# Patient Record
Sex: FEMALE | Race: WHITE | NOT HISPANIC OR LATINO | Employment: UNEMPLOYED | URBAN - METROPOLITAN AREA
[De-identification: names, ages, dates, MRNs, and addresses within clinical notes are randomized per-mention and may not be internally consistent; named-entity substitution may affect disease eponyms.]

---

## 2023-03-10 DIAGNOSIS — I10 PRIMARY HYPERTENSION: ICD-10-CM

## 2023-03-10 DIAGNOSIS — R73.03 PREDIABETES: Primary | ICD-10-CM

## 2023-03-10 DIAGNOSIS — F41.9 ANXIETY: ICD-10-CM

## 2023-03-10 DIAGNOSIS — M19.90 OSTEOARTHRITIS, UNSPECIFIED OSTEOARTHRITIS TYPE, UNSPECIFIED SITE: ICD-10-CM

## 2023-03-10 DIAGNOSIS — E78.5 HYPERLIPIDEMIA, UNSPECIFIED HYPERLIPIDEMIA TYPE: ICD-10-CM

## 2023-03-10 DIAGNOSIS — E78.2 MIXED HYPERLIPIDEMIA: ICD-10-CM

## 2023-03-10 DIAGNOSIS — R73.03 PREDIABETES: ICD-10-CM

## 2023-03-10 DIAGNOSIS — M17.0 PRIMARY OSTEOARTHRITIS OF BOTH KNEES: ICD-10-CM

## 2023-03-10 RX ORDER — ESCITALOPRAM OXALATE 10 MG/1
10 TABLET ORAL DAILY
Qty: 90 TABLET | Refills: 0 | Status: CANCELLED | OUTPATIENT
Start: 2023-03-10

## 2023-03-10 RX ORDER — LISINOPRIL 5 MG/1
5 TABLET ORAL DAILY
COMMUNITY
End: 2023-03-12 | Stop reason: SDUPTHER

## 2023-03-10 RX ORDER — ONDANSETRON 4 MG/1
8 TABLET, FILM COATED ORAL EVERY 8 HOURS PRN
COMMUNITY
End: 2023-07-19 | Stop reason: ALTCHOICE

## 2023-03-10 RX ORDER — MELOXICAM 15 MG/1
15 TABLET ORAL DAILY
COMMUNITY
Start: 2022-04-11 | End: 2023-03-12 | Stop reason: SDUPTHER

## 2023-03-10 RX ORDER — ESCITALOPRAM OXALATE 10 MG/1
10 TABLET ORAL DAILY
COMMUNITY
Start: 2020-03-04 | End: 2023-03-12 | Stop reason: SDUPTHER

## 2023-03-10 RX ORDER — MELOXICAM 15 MG/1
15 TABLET ORAL DAILY
Qty: 90 TABLET | Refills: 0 | Status: CANCELLED | OUTPATIENT
Start: 2023-03-10

## 2023-03-10 RX ORDER — ATORVASTATIN CALCIUM 20 MG/1
20 TABLET, FILM COATED ORAL DAILY
Qty: 90 TABLET | Refills: 0 | Status: CANCELLED | OUTPATIENT
Start: 2023-03-10

## 2023-03-10 RX ORDER — METFORMIN HYDROCHLORIDE 500 MG/1
1000 TABLET, EXTENDED RELEASE ORAL
COMMUNITY
End: 2023-03-12 | Stop reason: SDUPTHER

## 2023-03-10 RX ORDER — ATORVASTATIN CALCIUM 20 MG/1
20 TABLET, FILM COATED ORAL DAILY
COMMUNITY
End: 2023-03-12 | Stop reason: SDUPTHER

## 2023-03-10 RX ORDER — LISINOPRIL 5 MG/1
5 TABLET ORAL DAILY
Qty: 90 TABLET | Refills: 0 | Status: CANCELLED | OUTPATIENT
Start: 2023-03-10

## 2023-03-10 RX ORDER — METFORMIN HYDROCHLORIDE 500 MG/1
1000 TABLET, EXTENDED RELEASE ORAL
Qty: 90 TABLET | Refills: 0 | Status: CANCELLED | OUTPATIENT
Start: 2023-03-10

## 2023-03-10 RX ORDER — ALBUTEROL SULFATE 90 UG/1
1-2 AEROSOL, METERED RESPIRATORY (INHALATION)
COMMUNITY
Start: 2021-06-28

## 2023-03-12 RX ORDER — MELOXICAM 15 MG/1
15 TABLET ORAL DAILY
Qty: 90 TABLET | Refills: 0 | Status: SHIPPED | OUTPATIENT
Start: 2023-03-12 | End: 2023-05-02 | Stop reason: SDUPTHER

## 2023-03-12 RX ORDER — ATORVASTATIN CALCIUM 20 MG/1
20 TABLET, FILM COATED ORAL DAILY
Qty: 90 TABLET | Refills: 0 | Status: SHIPPED | OUTPATIENT
Start: 2023-03-12 | End: 2023-05-02 | Stop reason: SDUPTHER

## 2023-03-12 RX ORDER — LISINOPRIL 5 MG/1
5 TABLET ORAL DAILY
Qty: 90 TABLET | Refills: 0 | Status: SHIPPED | OUTPATIENT
Start: 2023-03-12 | End: 2023-05-02 | Stop reason: SDUPTHER

## 2023-03-12 RX ORDER — ESCITALOPRAM OXALATE 10 MG/1
10 TABLET ORAL DAILY
Qty: 90 TABLET | Refills: 0 | Status: SHIPPED | OUTPATIENT
Start: 2023-03-12 | End: 2023-04-12 | Stop reason: SDUPTHER

## 2023-03-12 RX ORDER — METFORMIN HYDROCHLORIDE 500 MG/1
1000 TABLET, EXTENDED RELEASE ORAL
Qty: 180 TABLET | Refills: 0 | Status: SHIPPED | OUTPATIENT
Start: 2023-03-12 | End: 2023-05-02 | Stop reason: SDUPTHER

## 2023-03-12 NOTE — PROGRESS NOTES
Insurance has requested that we mail out medications to [facility] instead of using the local pharmacy so we will do that for 3 months

## 2023-03-20 ENCOUNTER — TELEPHONE (OUTPATIENT)
Dept: PRIMARY CARE | Facility: CLINIC | Age: 58
End: 2023-03-20
Payer: COMMERCIAL

## 2023-03-20 NOTE — TELEPHONE ENCOUNTER
Pt has been monitoring her blood pressures for the last 5 days 140's over 90's   Has been taking lisinopril 5mg   Wondering what she can do

## 2023-04-12 DIAGNOSIS — F41.9 ANXIETY: ICD-10-CM

## 2023-04-12 RX ORDER — ESCITALOPRAM OXALATE 10 MG/1
10 TABLET ORAL DAILY
Qty: 90 TABLET | Refills: 0 | Status: SHIPPED | OUTPATIENT
Start: 2023-04-12 | End: 2023-05-02 | Stop reason: SDUPTHER

## 2023-04-27 DIAGNOSIS — M17.0 PRIMARY OSTEOARTHRITIS OF BOTH KNEES: ICD-10-CM

## 2023-04-27 DIAGNOSIS — E78.2 MIXED HYPERLIPIDEMIA: ICD-10-CM

## 2023-04-27 DIAGNOSIS — I10 PRIMARY HYPERTENSION: ICD-10-CM

## 2023-04-27 DIAGNOSIS — F41.9 ANXIETY: ICD-10-CM

## 2023-04-27 DIAGNOSIS — R73.03 PREDIABETES: ICD-10-CM

## 2023-04-30 RX ORDER — METFORMIN HYDROCHLORIDE 500 MG/1
TABLET, EXTENDED RELEASE ORAL
Qty: 180 TABLET | Refills: 3 | OUTPATIENT
Start: 2023-04-30

## 2023-04-30 RX ORDER — MELOXICAM 15 MG/1
TABLET ORAL
Qty: 90 TABLET | Refills: 3 | OUTPATIENT
Start: 2023-04-30

## 2023-04-30 RX ORDER — LISINOPRIL 5 MG/1
TABLET ORAL
Qty: 90 TABLET | Refills: 3 | OUTPATIENT
Start: 2023-04-30

## 2023-04-30 RX ORDER — ATORVASTATIN CALCIUM 20 MG/1
TABLET, FILM COATED ORAL
Qty: 90 TABLET | Refills: 3 | OUTPATIENT
Start: 2023-04-30

## 2023-05-02 RX ORDER — ATORVASTATIN CALCIUM 20 MG/1
20 TABLET, FILM COATED ORAL DAILY
Qty: 90 TABLET | Refills: 0 | Status: SHIPPED | OUTPATIENT
Start: 2023-05-02 | End: 2023-07-19 | Stop reason: SDUPTHER

## 2023-05-02 RX ORDER — MELOXICAM 15 MG/1
15 TABLET ORAL DAILY
Qty: 90 TABLET | Refills: 0 | Status: SHIPPED | OUTPATIENT
Start: 2023-05-02 | End: 2023-07-20

## 2023-05-02 RX ORDER — ESCITALOPRAM OXALATE 10 MG/1
10 TABLET ORAL DAILY
Qty: 90 TABLET | Refills: 0 | Status: SHIPPED | OUTPATIENT
Start: 2023-05-02 | End: 2023-07-19 | Stop reason: SDUPTHER

## 2023-05-02 RX ORDER — METFORMIN HYDROCHLORIDE 500 MG/1
1000 TABLET, EXTENDED RELEASE ORAL
Qty: 180 TABLET | Refills: 0 | Status: SHIPPED | OUTPATIENT
Start: 2023-05-02 | End: 2023-07-19 | Stop reason: SDUPTHER

## 2023-05-02 RX ORDER — LISINOPRIL 10 MG/1
10 TABLET ORAL DAILY
Qty: 90 TABLET | Refills: 0 | Status: SHIPPED | OUTPATIENT
Start: 2023-05-02 | End: 2023-07-19 | Stop reason: SDUPTHER

## 2023-06-14 ENCOUNTER — OFFICE VISIT (OUTPATIENT)
Dept: PRIMARY CARE | Facility: CLINIC | Age: 58
End: 2023-06-14
Payer: COMMERCIAL

## 2023-06-14 DIAGNOSIS — Z23 NEED FOR SHINGLES VACCINE: Primary | ICD-10-CM

## 2023-06-14 PROBLEM — R73.03 PREDIABETES: Status: ACTIVE | Noted: 2023-06-14

## 2023-06-14 PROBLEM — F41.1 ANXIETY, GENERALIZED: Status: ACTIVE | Noted: 2023-06-14

## 2023-06-14 PROBLEM — M75.102 TEAR OF LEFT ROTATOR CUFF: Status: ACTIVE | Noted: 2023-06-14

## 2023-06-14 PROBLEM — E55.9 VITAMIN D DEFICIENCY: Status: ACTIVE | Noted: 2023-06-14

## 2023-06-14 PROBLEM — H02.729 EYELASH SCANTINESS: Status: ACTIVE | Noted: 2023-06-14

## 2023-06-14 PROBLEM — M17.0 PRIMARY OSTEOARTHRITIS OF BOTH KNEES: Status: ACTIVE | Noted: 2023-06-14

## 2023-06-14 PROBLEM — M21.41 FLAT FOOT (PES PLANUS) (ACQUIRED), RIGHT FOOT: Status: ACTIVE | Noted: 2023-06-14

## 2023-06-14 PROBLEM — J40 BRONCHITIS: Status: ACTIVE | Noted: 2023-06-14

## 2023-06-14 PROBLEM — M25.561 CHRONIC PAIN OF BOTH KNEES: Status: ACTIVE | Noted: 2023-06-14

## 2023-06-14 PROBLEM — M25.562 CHRONIC PAIN OF BOTH KNEES: Status: ACTIVE | Noted: 2023-06-14

## 2023-06-14 PROBLEM — E66.3 OVERWEIGHT (BMI 25.0-29.9): Status: ACTIVE | Noted: 2023-06-14

## 2023-06-14 PROBLEM — M25.50 ARTHRALGIA: Status: ACTIVE | Noted: 2023-06-14

## 2023-06-14 PROBLEM — N95.1 HOT FLASHES, MENOPAUSAL: Status: ACTIVE | Noted: 2023-06-14

## 2023-06-14 PROBLEM — M75.80 ROTATOR CUFF TENDINITIS: Status: ACTIVE | Noted: 2023-06-14

## 2023-06-14 PROBLEM — M17.11 ARTHRITIS OF KNEE, RIGHT: Status: ACTIVE | Noted: 2023-06-14

## 2023-06-14 PROBLEM — R61 EXCESSIVE SWEATING: Status: ACTIVE | Noted: 2023-06-14

## 2023-06-14 PROBLEM — I10 BENIGN ESSENTIAL HYPERTENSION: Status: ACTIVE | Noted: 2023-06-14

## 2023-06-14 PROBLEM — M25.519 SHOULDER PAIN: Status: ACTIVE | Noted: 2023-06-14

## 2023-06-14 PROBLEM — J45.909 REACTIVE AIRWAY DISEASE (HHS-HCC): Status: ACTIVE | Noted: 2023-06-14

## 2023-06-14 PROBLEM — R74.8 ELEVATED ALKALINE PHOSPHATASE LEVEL: Status: ACTIVE | Noted: 2023-06-14

## 2023-06-14 PROBLEM — R05.8 PRODUCTIVE COUGH: Status: ACTIVE | Noted: 2023-06-14

## 2023-06-14 PROBLEM — R00.0 TACHYCARDIA: Status: ACTIVE | Noted: 2023-06-14

## 2023-06-14 PROBLEM — J32.9 RECURRENT SINUSITIS: Status: ACTIVE | Noted: 2023-06-14

## 2023-06-14 PROBLEM — E78.5 DYSLIPIDEMIA: Status: ACTIVE | Noted: 2023-06-14

## 2023-06-14 PROBLEM — G89.29 CHRONIC PAIN OF BOTH KNEES: Status: ACTIVE | Noted: 2023-06-14

## 2023-06-14 PROBLEM — R11.0 NAUSEA IN ADULT: Status: ACTIVE | Noted: 2023-06-14

## 2023-06-14 PROBLEM — E67.3 HIGH VITAMIN D LEVEL: Status: ACTIVE | Noted: 2023-06-14

## 2023-06-14 PROCEDURE — 90750 HZV VACC RECOMBINANT IM: CPT | Performed by: FAMILY MEDICINE

## 2023-06-14 PROCEDURE — 90471 IMMUNIZATION ADMIN: CPT | Performed by: FAMILY MEDICINE

## 2023-06-14 ASSESSMENT — PATIENT HEALTH QUESTIONNAIRE - PHQ9
SUM OF ALL RESPONSES TO PHQ9 QUESTIONS 1 AND 2: 0
2. FEELING DOWN, DEPRESSED OR HOPELESS: NOT AT ALL
1. LITTLE INTEREST OR PLEASURE IN DOING THINGS: NOT AT ALL

## 2023-06-14 NOTE — PROGRESS NOTES
All questions were answered and you were counseled on the particular immunization(s) provided today

## 2023-06-14 NOTE — PROGRESS NOTES
Subjective   Patient ID: Bree Bojorquez is a 57 y.o. female who presents for vaccination update(s)    Allergies   Allergen Reactions    Codeine Rash    Penicillin Rash    Sulfa (Sulfonamide Antibiotics) Rash       Immunization History   Administered Date(s) Administered    Influenza, Unspecified 10/14/2010, 09/09/2011, 09/21/2021    Influenza, injectable, MDCK, preservative free, quadrivalent 09/24/2018    Novel influenza-H1N1-09, preservative-free 01/11/2010    Pfizer Purple Cap SARS-CoV-2 03/20/2021, 04/11/2021, 11/23/2021    Pfizer Sars-cov-2 Bivalent 30 mcg/0.3 mL 10/31/2022    Tdap 10/15/2012, 12/09/2018    Zoster, Recombinant 02/22/2023, 06/14/2023    Zoster, live 09/01/2016, 09/21/2016, 10/03/2016       Assessment/Plan   Problem List Items Addressed This Visit    None  Visit Diagnoses       Need for shingles vaccine    -  Primary    Relevant Orders    Zoster vaccine, recombinant, adult (SHINGRIX) (Completed)           All questions were answered and you were counseled on the particular immunization(s) provided today

## 2023-07-14 DIAGNOSIS — R73.03 PREDIABETES: ICD-10-CM

## 2023-07-14 DIAGNOSIS — Z00.00 ANNUAL PHYSICAL EXAM: ICD-10-CM

## 2023-07-14 DIAGNOSIS — I10 BENIGN ESSENTIAL HYPERTENSION: ICD-10-CM

## 2023-07-14 DIAGNOSIS — E55.9 VITAMIN D DEFICIENCY: Primary | ICD-10-CM

## 2023-07-14 DIAGNOSIS — E78.5 DYSLIPIDEMIA: ICD-10-CM

## 2023-07-15 ENCOUNTER — LAB (OUTPATIENT)
Dept: LAB | Facility: LAB | Age: 58
End: 2023-07-15
Payer: COMMERCIAL

## 2023-07-15 DIAGNOSIS — Z00.00 ANNUAL PHYSICAL EXAM: ICD-10-CM

## 2023-07-15 DIAGNOSIS — I10 BENIGN ESSENTIAL HYPERTENSION: ICD-10-CM

## 2023-07-15 DIAGNOSIS — R73.03 PREDIABETES: ICD-10-CM

## 2023-07-15 DIAGNOSIS — E78.5 DYSLIPIDEMIA: ICD-10-CM

## 2023-07-15 DIAGNOSIS — E55.9 VITAMIN D DEFICIENCY: ICD-10-CM

## 2023-07-15 LAB
ALANINE AMINOTRANSFERASE (SGPT) (U/L) IN SER/PLAS: 18 U/L (ref 7–45)
ALBUMIN (G/DL) IN SER/PLAS: 4.3 G/DL (ref 3.4–5)
ALKALINE PHOSPHATASE (U/L) IN SER/PLAS: 99 U/L (ref 33–110)
ANION GAP IN SER/PLAS: 14 MMOL/L (ref 10–20)
ASPARTATE AMINOTRANSFERASE (SGOT) (U/L) IN SER/PLAS: 17 U/L (ref 9–39)
BASOPHILS (10*3/UL) IN BLOOD BY AUTOMATED COUNT: 0.07 X10E9/L (ref 0–0.1)
BASOPHILS/100 LEUKOCYTES IN BLOOD BY AUTOMATED COUNT: 1.1 % (ref 0–2)
BILIRUBIN TOTAL (MG/DL) IN SER/PLAS: 0.9 MG/DL (ref 0–1.2)
CALCIDIOL (25 OH VITAMIN D3) (NG/ML) IN SER/PLAS: 35 NG/ML
CALCIUM (MG/DL) IN SER/PLAS: 9.5 MG/DL (ref 8.6–10.6)
CARBON DIOXIDE, TOTAL (MMOL/L) IN SER/PLAS: 26 MMOL/L (ref 21–32)
CHLORIDE (MMOL/L) IN SER/PLAS: 106 MMOL/L (ref 98–107)
CHOLESTEROL (MG/DL) IN SER/PLAS: 179 MG/DL (ref 0–199)
CHOLESTEROL IN HDL (MG/DL) IN SER/PLAS: 50.9 MG/DL
CHOLESTEROL/HDL RATIO: 3.5
CREATININE (MG/DL) IN SER/PLAS: 0.66 MG/DL (ref 0.5–1.05)
EOSINOPHILS (10*3/UL) IN BLOOD BY AUTOMATED COUNT: 0.14 X10E9/L (ref 0–0.7)
EOSINOPHILS/100 LEUKOCYTES IN BLOOD BY AUTOMATED COUNT: 2.2 % (ref 0–6)
ERYTHROCYTE DISTRIBUTION WIDTH (RATIO) BY AUTOMATED COUNT: 12.2 % (ref 11.5–14.5)
ERYTHROCYTE MEAN CORPUSCULAR HEMOGLOBIN CONCENTRATION (G/DL) BY AUTOMATED: 32.4 G/DL (ref 32–36)
ERYTHROCYTE MEAN CORPUSCULAR VOLUME (FL) BY AUTOMATED COUNT: 91 FL (ref 80–100)
ERYTHROCYTES (10*6/UL) IN BLOOD BY AUTOMATED COUNT: 4.56 X10E12/L (ref 4–5.2)
ESTIMATED AVERAGE GLUCOSE FOR HBA1C: 123 MG/DL
GFR FEMALE: >90 ML/MIN/1.73M2
GLUCOSE (MG/DL) IN SER/PLAS: 104 MG/DL (ref 74–99)
HEMATOCRIT (%) IN BLOOD BY AUTOMATED COUNT: 41.4 % (ref 36–46)
HEMOGLOBIN (G/DL) IN BLOOD: 13.4 G/DL (ref 12–16)
HEMOGLOBIN A1C/HEMOGLOBIN TOTAL IN BLOOD: 5.9 %
IMMATURE GRANULOCYTES/100 LEUKOCYTES IN BLOOD BY AUTOMATED COUNT: 0.5 % (ref 0–0.9)
LDL: 104 MG/DL (ref 0–99)
LEUKOCYTES (10*3/UL) IN BLOOD BY AUTOMATED COUNT: 6.3 X10E9/L (ref 4.4–11.3)
LYMPHOCYTES (10*3/UL) IN BLOOD BY AUTOMATED COUNT: 1.49 X10E9/L (ref 1.2–4.8)
LYMPHOCYTES/100 LEUKOCYTES IN BLOOD BY AUTOMATED COUNT: 23.7 % (ref 13–44)
MONOCYTES (10*3/UL) IN BLOOD BY AUTOMATED COUNT: 0.28 X10E9/L (ref 0.1–1)
MONOCYTES/100 LEUKOCYTES IN BLOOD BY AUTOMATED COUNT: 4.5 % (ref 2–10)
NEUTROPHILS (10*3/UL) IN BLOOD BY AUTOMATED COUNT: 4.28 X10E9/L (ref 1.2–7.7)
NEUTROPHILS/100 LEUKOCYTES IN BLOOD BY AUTOMATED COUNT: 68 % (ref 40–80)
NRBC (PER 100 WBCS) BY AUTOMATED COUNT: 0 /100 WBC (ref 0–0)
PLATELETS (10*3/UL) IN BLOOD AUTOMATED COUNT: 252 X10E9/L (ref 150–450)
POTASSIUM (MMOL/L) IN SER/PLAS: 4.4 MMOL/L (ref 3.5–5.3)
PROTEIN TOTAL: 6.5 G/DL (ref 6.4–8.2)
SODIUM (MMOL/L) IN SER/PLAS: 142 MMOL/L (ref 136–145)
THYROTROPIN (MIU/L) IN SER/PLAS BY DETECTION LIMIT <= 0.05 MIU/L: 1.51 MIU/L (ref 0.44–3.98)
TRIGLYCERIDE (MG/DL) IN SER/PLAS: 122 MG/DL (ref 0–149)
UREA NITROGEN (MG/DL) IN SER/PLAS: 17 MG/DL (ref 6–23)
VLDL: 24 MG/DL (ref 0–40)

## 2023-07-15 PROCEDURE — 80053 COMPREHEN METABOLIC PANEL: CPT

## 2023-07-15 PROCEDURE — 83036 HEMOGLOBIN GLYCOSYLATED A1C: CPT

## 2023-07-15 PROCEDURE — 82306 VITAMIN D 25 HYDROXY: CPT

## 2023-07-15 PROCEDURE — 80061 LIPID PANEL: CPT

## 2023-07-15 PROCEDURE — 85025 COMPLETE CBC W/AUTO DIFF WBC: CPT

## 2023-07-15 PROCEDURE — 36415 COLL VENOUS BLD VENIPUNCTURE: CPT

## 2023-07-15 PROCEDURE — 84443 ASSAY THYROID STIM HORMONE: CPT

## 2023-07-17 NOTE — RESULT ENCOUNTER NOTE
We will review in office on July 19:  Cholesterol 179, 50, 104, 122 previously 179, 62, 98, 92  Glucose 104 previously 112, 107, 114  Electrolytes, kidney, liver, complete blood cell count normal  Vitamin D 35  Thyroid perfect with a TSH of 1.51  Hemoglobin A1c 5.9 previous 5.6, 6.0

## 2023-07-19 ENCOUNTER — OFFICE VISIT (OUTPATIENT)
Dept: PRIMARY CARE | Facility: CLINIC | Age: 58
End: 2023-07-19
Payer: COMMERCIAL

## 2023-07-19 VITALS
BODY MASS INDEX: 26.61 KG/M2 | SYSTOLIC BLOOD PRESSURE: 128 MMHG | DIASTOLIC BLOOD PRESSURE: 70 MMHG | WEIGHT: 175 LBS | HEART RATE: 70 BPM

## 2023-07-19 DIAGNOSIS — J01.40 ACUTE NON-RECURRENT PANSINUSITIS: ICD-10-CM

## 2023-07-19 DIAGNOSIS — E78.2 MIXED HYPERLIPIDEMIA: ICD-10-CM

## 2023-07-19 DIAGNOSIS — M17.0 PRIMARY OSTEOARTHRITIS OF BOTH KNEES: ICD-10-CM

## 2023-07-19 DIAGNOSIS — F41.1 ANXIETY, GENERALIZED: ICD-10-CM

## 2023-07-19 DIAGNOSIS — R73.03 PREDIABETES: ICD-10-CM

## 2023-07-19 DIAGNOSIS — E55.9 VITAMIN D DEFICIENCY: ICD-10-CM

## 2023-07-19 DIAGNOSIS — M17.11 ARTHRITIS OF KNEE, RIGHT: ICD-10-CM

## 2023-07-19 DIAGNOSIS — S83.511D ANTERIOR CRUCIATE LIGAMENT COMPLETE TEAR, RIGHT, SUBSEQUENT ENCOUNTER: ICD-10-CM

## 2023-07-19 DIAGNOSIS — I10 BENIGN ESSENTIAL HYPERTENSION: Primary | ICD-10-CM

## 2023-07-19 PROBLEM — J40 BRONCHITIS: Status: RESOLVED | Noted: 2023-06-14 | Resolved: 2023-07-19

## 2023-07-19 PROBLEM — J32.9 RECURRENT SINUSITIS: Status: RESOLVED | Noted: 2023-06-14 | Resolved: 2023-07-19

## 2023-07-19 PROBLEM — G54.9 NERVE ROOT DISORDER: Status: ACTIVE | Noted: 2021-11-16

## 2023-07-19 PROBLEM — J45.909 REACTIVE AIRWAY DISEASE (HHS-HCC): Status: RESOLVED | Noted: 2023-06-14 | Resolved: 2023-07-19

## 2023-07-19 PROBLEM — E78.5 DYSLIPIDEMIA: Status: RESOLVED | Noted: 2023-06-14 | Resolved: 2023-07-19

## 2023-07-19 PROBLEM — R11.0 NAUSEA IN ADULT: Status: RESOLVED | Noted: 2023-06-14 | Resolved: 2023-07-19

## 2023-07-19 PROBLEM — L57.0 ACTINIC KERATOSIS: Status: ACTIVE | Noted: 2019-05-15

## 2023-07-19 PROBLEM — M25.519 SHOULDER PAIN: Status: RESOLVED | Noted: 2023-06-14 | Resolved: 2023-07-19

## 2023-07-19 PROBLEM — C44.310 BASAL CELL CARCINOMA OF SKIN OF FACE: Status: RESOLVED | Noted: 2019-05-15 | Resolved: 2023-07-19

## 2023-07-19 PROBLEM — E88.819 INSULIN RESISTANCE: Status: ACTIVE | Noted: 2021-11-16

## 2023-07-19 PROBLEM — R00.0 TACHYCARDIA: Status: RESOLVED | Noted: 2023-06-14 | Resolved: 2023-07-19

## 2023-07-19 PROBLEM — M25.50 ARTHRALGIA: Status: RESOLVED | Noted: 2023-06-14 | Resolved: 2023-07-19

## 2023-07-19 PROBLEM — Q66.90: Status: ACTIVE | Noted: 2017-08-28

## 2023-07-19 PROBLEM — J01.90 ACUTE NON-RECURRENT SINUSITIS: Status: ACTIVE | Noted: 2023-07-19

## 2023-07-19 PROBLEM — G54.9 NERVE ROOT DISORDER: Status: RESOLVED | Noted: 2021-11-16 | Resolved: 2023-07-19

## 2023-07-19 PROBLEM — R05.8 PRODUCTIVE COUGH: Status: RESOLVED | Noted: 2023-06-14 | Resolved: 2023-07-19

## 2023-07-19 PROBLEM — C44.310 BASAL CELL CARCINOMA OF SKIN OF FACE: Status: ACTIVE | Noted: 2019-05-15

## 2023-07-19 PROBLEM — E67.3 HIGH VITAMIN D LEVEL: Status: RESOLVED | Noted: 2023-06-14 | Resolved: 2023-07-19

## 2023-07-19 PROCEDURE — 99214 OFFICE O/P EST MOD 30 MIN: CPT | Performed by: FAMILY MEDICINE

## 2023-07-19 PROCEDURE — 3078F DIAST BP <80 MM HG: CPT | Performed by: FAMILY MEDICINE

## 2023-07-19 PROCEDURE — 3074F SYST BP LT 130 MM HG: CPT | Performed by: FAMILY MEDICINE

## 2023-07-19 PROCEDURE — 1036F TOBACCO NON-USER: CPT | Performed by: FAMILY MEDICINE

## 2023-07-19 RX ORDER — METFORMIN HYDROCHLORIDE 500 MG/1
1000 TABLET, EXTENDED RELEASE ORAL
Qty: 180 TABLET | Refills: 1 | Status: SHIPPED | OUTPATIENT
Start: 2023-07-19 | End: 2024-01-05

## 2023-07-19 RX ORDER — ATORVASTATIN CALCIUM 20 MG/1
20 TABLET, FILM COATED ORAL DAILY
Qty: 90 TABLET | Refills: 1 | Status: SHIPPED | OUTPATIENT
Start: 2023-07-19 | End: 2024-01-05

## 2023-07-19 RX ORDER — ESCITALOPRAM OXALATE 10 MG/1
10 TABLET ORAL DAILY
Qty: 90 TABLET | Refills: 1 | Status: SHIPPED | OUTPATIENT
Start: 2023-07-19 | End: 2024-01-05

## 2023-07-19 RX ORDER — LISINOPRIL 10 MG/1
10 TABLET ORAL DAILY
Qty: 90 TABLET | Refills: 1 | Status: SHIPPED | OUTPATIENT
Start: 2023-07-19 | End: 2024-02-16 | Stop reason: SDUPTHER

## 2023-07-19 RX ORDER — DOXYCYCLINE 100 MG/1
100 CAPSULE ORAL 2 TIMES DAILY
Qty: 14 CAPSULE | Refills: 0 | Status: SHIPPED | OUTPATIENT
Start: 2023-07-19 | End: 2023-07-26

## 2023-07-19 ASSESSMENT — PATIENT HEALTH QUESTIONNAIRE - PHQ9
2. FEELING DOWN, DEPRESSED OR HOPELESS: NOT AT ALL
SUM OF ALL RESPONSES TO PHQ9 QUESTIONS 1 AND 2: 0
1. LITTLE INTEREST OR PLEASURE IN DOING THINGS: NOT AT ALL

## 2023-07-19 NOTE — ASSESSMENT & PLAN NOTE
A1C is 5.9 which is a little higher than your previous 7 months ago 5.6%. Please continue to take your metformin 1000 mg daily and monitor carbohydrate intake. Continue to exercise as tolerated.

## 2023-07-19 NOTE — ASSESSMENT & PLAN NOTE
LDL and triglycerides were slightly elevated at this visit compared to previous. Lipitor 20 mg refilled at this visit. Please continue to monitor diet and exercise as tolerated.

## 2023-07-19 NOTE — ASSESSMENT & PLAN NOTE
Suggested taking at least 2000 IU daily along with calcium 1500 mg daily  Vitamin D 35, we would like to see that at 40-80

## 2023-07-19 NOTE — ASSESSMENT & PLAN NOTE
Best wishes with upcoming right knee replacement surgery  Notify the office if we can help in any way

## 2023-07-19 NOTE — PROGRESS NOTES
Subjective   Patient ID: Bree Bojorquez is a 57 y.o. female who presents for Hypertension, Hyperlipidemia, and Anxiety.    HPI  Blood work done in preparation for today's visit:  Cholesterol 179, 50, 104, 122 previously 179, 62, 98, 92  Glucose 104 previously 112, 107, 114  Electrolytes, kidney, liver, complete blood cell count normal  Vitamin D 35  Thyroid perfect with a TSH of 1.51  Hemoglobin A1c 5.9 previous 5.6, 6.0    1.  Hypertension.  Normotensive on intake and repeat  No chest pain or shortness of breath  Tolerates medication well, requesting refill    2.  Prediabetes.  Currently feeling well with metformin, requesting refill  Hemoglobin A1c 5.9% previous 5.6, 6.0    3.  Hyperlipidemia.  Currently taking atorvastatin 20 mg daily  Tolerating well  Cholesterol 179 with  previously 179 and 98 so it has remained stable    4.  Sinus problems.  Last weekend (7/8/23) around her grandchild who was sick with an ear infection and on antibiotics.   (7/12) started experiencing ear pain L>R, sinus pressure and drainage, headache and congestion.   She denies any cough, shortness of breath, fever or chills. She  took an OTC cold pill with tylenol with no improvement.     5.  Right knee osteoarthritis.  Knee replacement forthcoming with Dr. Fuentes in September or October  Also has torn ACL, so when she spoke with Dr. Caldwell about repairing the ACL, he suggested that since she needs a knee replacement anyhow, she should consider doing so  Looking forward to it so that she can walk more and be more active overall with her grandchildren     Review of Systems  All pertinent positive symptoms are included in the history of present illness.    All other systems have been reviewed and are negative and noncontributory to this patient's current ailments.    Allergies   Allergen Reactions    Codeine Rash    Penicillin Rash    Sulfa (Sulfonamide Antibiotics) Rash       Immunization History   Administered Date(s)  Administered    Influenza, Unspecified 10/14/2010, 09/09/2011, 09/21/2021, 10/31/2022    Influenza, injectable, MDCK, preservative free, quadrivalent 09/24/2018    Influenza, injectable, quadrivalent, preservative free 09/17/2016    Novel influenza-H1N1-09, preservative-free 01/11/2010    Pfizer Purple Cap SARS-CoV-2 03/20/2021, 04/11/2021, 11/23/2021    Pfizer Sars-cov-2 Bivalent 30 mcg/0.3 mL 10/31/2022    Tdap 10/15/2012, 12/09/2018    Zoster, Recombinant 02/22/2023, 06/14/2023    Zoster, live 09/01/2016, 09/21/2016, 10/03/2016       Objective   Visit Vitals  /70   Pulse 70   Wt 79.4 kg (175 lb)   BMI 26.61 kg/m²   Smoking Status Never   BSA 1.95 m²       Physical Exam  CONSTITUTIONAL - well nourished, well developed, looks like stated age, in no acute distress, not ill-appearing, and not tired appearing  SKIN - normal skin color and pigmentation, normal skin turgor without rash, lesions, or nodules visualized  HEAD - no trauma, normocephalic + mild sinus tenderness in both the frontal and maxillary sinuses  EYES - pupils are equal and reactive to light, extraocular muscles are intact, and normal external exam  ENT - TM's intact, no injection, no signs of infection, uvula midline, normal tongue movement and throat normal, no exudate, nasal passage without discharge and patent  NECK - supple without rigidity, no neck mass was observed, no thyromegaly or thyroid nodules  CHEST - clear to auscultation, no wheezing, no crackles and no rales, good effort  CARDIAC - regular rate and regular rhythm, no skipped beats, no murmur  ABDOMEN - no organomegaly, soft, nontender, nondistended, normal bowel sounds, no guarding/rebound/rigidity, negative McBurney sign and negative Rae sign  EXTREMITIES - no edema, no deformities  PSYCHIATRIC - alert, pleasant and cordial, age-appropriate  IMMUNOLOGIC - no cervical lymphadenopathy     Assessment/Plan   Problem List Items Addressed This Visit       Anxiety, generalized      Stable, no changes to medication recommended         Relevant Medications    escitalopram (Lexapro) 10 mg tablet    Arthritis of knee, right     Best wishes with upcoming right knee replacement surgery  Notify the office if we can help in any way         Benign essential hypertension - Primary     Blood pressure seems well controlled on lisinopril 10 mg. Refilled at this visit.          Relevant Medications    lisinopril 10 mg tablet    Prediabetes     A1C is 5.9 which is a little higher than your previous 7 months ago 5.6%. Please continue to take your metformin 1000 mg daily and monitor carbohydrate intake. Continue to exercise as tolerated.          Relevant Medications    metFORMIN XR (Glucophage-XR) 500 mg 24 hr tablet    Vitamin D deficiency     Suggested taking at least 2000 IU daily along with calcium 1500 mg daily  Vitamin D 35, we would like to see that at 40-80         Anterior cruciate ligament complete tear, right, subsequent encounter     Best wishes with upcoming right knee replacement surgery  Notify the office if we can help in any way         Mixed hyperlipidemia     LDL and triglycerides were slightly elevated at this visit compared to previous. Lipitor 20 mg refilled at this visit. Please continue to monitor diet and exercise as tolerated.          Relevant Medications    atorvastatin (Lipitor) 20 mg tablet    Acute non-recurrent sinusitis    Relevant Medications    doxycycline (Vibramycin) 100 mg capsule

## 2023-07-20 RX ORDER — MELOXICAM 15 MG/1
15 TABLET ORAL DAILY PRN
Qty: 90 TABLET | Refills: 1 | Status: SHIPPED | OUTPATIENT
Start: 2023-07-20 | End: 2023-12-30 | Stop reason: HOSPADM

## 2023-08-29 ENCOUNTER — HOSPITAL ENCOUNTER (OUTPATIENT)
Dept: DATA CONVERSION | Facility: HOSPITAL | Age: 58
Discharge: HOME | End: 2023-08-29

## 2023-08-29 DIAGNOSIS — M17.11 UNILATERAL PRIMARY OSTEOARTHRITIS, RIGHT KNEE: ICD-10-CM

## 2023-08-29 DIAGNOSIS — S83.511A SPRAIN OF ANTERIOR CRUCIATE LIGAMENT OF RIGHT KNEE, INITIAL ENCOUNTER: ICD-10-CM

## 2023-08-29 LAB
ALBUMIN SERPL-MCNC: 4.5 GM/DL (ref 3.5–5)
ALBUMIN/GLOB SERPL: 2 RATIO (ref 1.5–3)
ALP BLD-CCNC: 120 U/L (ref 35–125)
ALT SERPL-CCNC: 22 U/L (ref 5–40)
ANION GAP SERPL CALCULATED.3IONS-SCNC: 10 MMOL/L (ref 0–19)
ANTICOAGULANT: NORMAL
APTT PPP: 23.5 SEC (ref 22–32.5)
AST SERPL-CCNC: 21 U/L (ref 5–40)
BACTERIA UR QL AUTO: ABNORMAL
BASOPHILS # BLD AUTO: 0.04 K/UL (ref 0–0.22)
BASOPHILS NFR BLD AUTO: 0.7 % (ref 0–1)
BILIRUB SERPL-MCNC: 0.7 MG/DL (ref 0.1–1.2)
BILIRUB UR QL STRIP.AUTO: NEGATIVE
BUN SERPL-MCNC: 13 MG/DL (ref 8–25)
BUN/CREAT SERPL: 16.3 RATIO (ref 8–21)
CALCIUM SERPL-MCNC: 9.6 MG/DL (ref 8.5–10.4)
CHLORIDE SERPL-SCNC: 104 MMOL/L (ref 97–107)
CLARITY UR: CLEAR
CO2 SERPL-SCNC: 25 MMOL/L (ref 24–31)
COLOR UR: YELLOW
CREAT SERPL-MCNC: 0.8 MG/DL (ref 0.4–1.6)
DEPRECATED RDW RBC AUTO: 40 FL (ref 37–54)
DIFFERENTIAL METHOD BLD: NORMAL
EOSINOPHIL # BLD AUTO: 0.07 K/UL (ref 0–0.45)
EOSINOPHIL NFR BLD: 1.2 % (ref 0–3)
EPITH CASTS #/AREA UR COMP ASSIST: ABNORMAL /HPF
ERYTHROCYTE [DISTWIDTH] IN BLOOD BY AUTOMATED COUNT: 12 % (ref 11.7–15)
GFR SERPL CREATININE-BSD FRML MDRD: 86 ML/MIN/1.73 M2
GLOBULIN SER-MCNC: 2.3 G/DL (ref 1.9–3.7)
GLUCOSE SERPL-MCNC: 88 MG/DL (ref 65–99)
GLUCOSE UR STRIP.AUTO-MCNC: NEGATIVE MG/DL
HCT VFR BLD AUTO: 40 % (ref 36–44)
HGB BLD-MCNC: 13.3 GM/DL (ref 12–15)
HGB UR QL STRIP.AUTO: ABNORMAL /HPF (ref 0–3)
HGB UR QL: NEGATIVE
IMM GRANULOCYTES # BLD AUTO: 0.01 K/UL (ref 0–0.1)
INR PPP: 1 (ref 0.86–1.16)
KETONES UR QL STRIP.AUTO: NEGATIVE
LEUKOCYTE ESTERASE UR QL STRIP.AUTO: ABNORMAL
LYMPHOCYTES # BLD AUTO: 1.4 K/UL (ref 1.2–3.2)
LYMPHOCYTES NFR BLD MANUAL: 23.8 % (ref 20–40)
MCH RBC QN AUTO: 29.8 PG (ref 26–34)
MCHC RBC AUTO-ENTMCNC: 33.3 % (ref 31–37)
MCV RBC AUTO: 89.7 FL (ref 80–100)
MICROSCOPIC (UA): ABNORMAL
MONOCYTES # BLD AUTO: 0.38 K/UL (ref 0–0.8)
MONOCYTES NFR BLD MANUAL: 6.5 % (ref 0–8)
MRSA DNA SPEC QL NAA+PROBE: NEGATIVE
NEUTROPHILS # BLD AUTO: 3.99 K/UL
NEUTROPHILS # BLD AUTO: 3.99 K/UL (ref 1.8–7.7)
NEUTROPHILS.IMMATURE NFR BLD: 0.2 % (ref 0–1)
NEUTS SEG NFR BLD: 67.6 % (ref 50–70)
NITRITE UR QL STRIP.AUTO: NEGATIVE
PH UR STRIP.AUTO: 5.5 [PH] (ref 4.6–8)
PLATELET # BLD AUTO: 203 K/UL (ref 150–450)
PMV BLD AUTO: 9.6 CU (ref 7–12.6)
POTASSIUM SERPL-SCNC: 3.9 MMOL/L (ref 3.4–5.1)
PROT SERPL-MCNC: 6.8 G/DL (ref 5.9–7.9)
PROT UR STRIP.AUTO-MCNC: NEGATIVE MG/DL
PROTHROMBIN TIME: 9.8 SEC (ref 9.3–12.7)
RBC # BLD AUTO: 4.46 M/UL (ref 4–4.9)
SODIUM SERPL-SCNC: 139 MMOL/L (ref 133–145)
SP GR UR STRIP.AUTO: <=1.005 (ref 1–1.03)
SPECIMEN SOURCE: NORMAL
URINE CULTURE: ABNORMAL
UROBILINOGEN UR QL STRIP.AUTO: 0.2 MG/DL (ref 0–1)
WBC # BLD AUTO: 5.9 K/UL (ref 4.5–11)
WBC #/AREA URNS AUTO: ABNORMAL /HPF (ref 0–3)

## 2023-08-30 LAB
AMOXICILLIN+CLAV SUSC ISLT: NORMAL
AMPICILLIN+SULBAC SUSC ISLT: NORMAL
BACTERIA ISLT: NORMAL
BACTERIA SPEC CULT: NORMAL
CC # UR: NORMAL /UL
CIPROFLOXACIN SUSC ISLT: NORMAL
LEVOFLOXACIN SUSC ISLT: NORMAL
MEROPENEM SUSC ISLT: NORMAL
MIC (SUSCEPTIBILITY): NORMAL
NITROFURANTOIN SUSC ISLT: NORMAL
PIP+TAZO SUSC ISLT: NORMAL
REPORT STATUS -LH SQ DATA CONVERSION: NORMAL
SERVICE CMNT-IMP: NORMAL
SPECIMEN SOURCE: NORMAL
TMP SMX SUSC ISLT: NORMAL

## 2023-09-15 ENCOUNTER — HOSPITAL ENCOUNTER (OUTPATIENT)
Dept: DATA CONVERSION | Facility: HOSPITAL | Age: 58
Discharge: HOME | End: 2023-09-16

## 2023-09-15 DIAGNOSIS — M17.11 UNILATERAL PRIMARY OSTEOARTHRITIS, RIGHT KNEE: ICD-10-CM

## 2023-09-15 DIAGNOSIS — I10 ESSENTIAL (PRIMARY) HYPERTENSION: ICD-10-CM

## 2023-09-15 DIAGNOSIS — M25.761 OSTEOPHYTE, RIGHT KNEE: ICD-10-CM

## 2023-09-15 DIAGNOSIS — G89.18 OTHER ACUTE POSTPROCEDURAL PAIN: ICD-10-CM

## 2023-09-15 DIAGNOSIS — Z79.84 LONG TERM (CURRENT) USE OF ORAL HYPOGLYCEMIC DRUGS: ICD-10-CM

## 2023-09-15 DIAGNOSIS — E78.5 HYPERLIPIDEMIA, UNSPECIFIED: ICD-10-CM

## 2023-09-15 DIAGNOSIS — E11.9 TYPE 2 DIABETES MELLITUS WITHOUT COMPLICATIONS (MULTI): ICD-10-CM

## 2023-09-15 DIAGNOSIS — Z79.899 OTHER LONG TERM (CURRENT) DRUG THERAPY: ICD-10-CM

## 2023-09-15 DIAGNOSIS — Z88.2 ALLERGY STATUS TO SULFONAMIDES: ICD-10-CM

## 2023-09-15 LAB
GLUCOSE BLD STRIP.AUTO-MCNC: 151 MG/DL (ref 65–99)
GLUCOSE BLD STRIP.AUTO-MCNC: 328 MG/DL (ref 65–99)
GLUCOSE BLD STRIP.AUTO-MCNC: 431 MG/DL (ref 65–99)

## 2023-09-16 LAB
ANION GAP SERPL CALCULATED.3IONS-SCNC: 11 MMOL/L (ref 0–19)
BUN SERPL-MCNC: 12 MG/DL (ref 8–25)
BUN/CREAT SERPL: 20 RATIO (ref 8–21)
CALCIUM SERPL-MCNC: 9.3 MG/DL (ref 8.5–10.4)
CHLORIDE SERPL-SCNC: 105 MMOL/L (ref 97–107)
CO2 SERPL-SCNC: 23 MMOL/L (ref 24–31)
CREAT SERPL-MCNC: 0.6 MG/DL (ref 0.4–1.6)
GFR SERPL CREATININE-BSD FRML MDRD: 105 ML/MIN/1.73 M2
GLUCOSE BLD STRIP.AUTO-MCNC: 121 MG/DL (ref 65–99)
GLUCOSE BLD STRIP.AUTO-MCNC: 155 MG/DL (ref 65–99)
GLUCOSE SERPL-MCNC: 166 MG/DL (ref 65–99)
HBA1C MFR BLD: 6.1 % (ref 4–6)
HCT VFR BLD AUTO: 35 % (ref 36–44)
HGB BLD-MCNC: 12.1 GM/DL (ref 12–15)
POTASSIUM SERPL-SCNC: 4.3 MMOL/L (ref 3.4–5.1)
SODIUM SERPL-SCNC: 139 MMOL/L (ref 133–145)

## 2023-10-02 ENCOUNTER — TELEPHONE (OUTPATIENT)
Dept: ORTHOPEDIC SURGERY | Facility: CLINIC | Age: 58
End: 2023-10-02
Payer: COMMERCIAL

## 2023-10-02 DIAGNOSIS — M17.11 PRIMARY OSTEOARTHRITIS OF RIGHT KNEE: Primary | ICD-10-CM

## 2023-10-02 RX ORDER — ONDANSETRON 4 MG/1
4 TABLET, FILM COATED ORAL EVERY 8 HOURS PRN
Qty: 20 TABLET | Refills: 0 | Status: SHIPPED | OUTPATIENT
Start: 2023-10-02 | End: 2023-10-09

## 2023-10-02 NOTE — TELEPHONE ENCOUNTER
Patient notified to wait longer than 4 hours for the Zofran wears off. Try and take the zofran every 8 hours. Try saurabh chews. OTC Unisom per Dr. Fuentes for sleep

## 2023-10-03 ENCOUNTER — TELEPHONE (OUTPATIENT)
Dept: ORTHOPEDIC SURGERY | Facility: CLINIC | Age: 58
End: 2023-10-03
Payer: COMMERCIAL

## 2023-10-05 ENCOUNTER — TELEPHONE (OUTPATIENT)
Dept: ORTHOPEDIC SURGERY | Facility: CLINIC | Age: 58
End: 2023-10-05
Payer: COMMERCIAL

## 2023-10-05 ENCOUNTER — APPOINTMENT (OUTPATIENT)
Dept: PHYSICAL THERAPY | Facility: CLINIC | Age: 58
End: 2023-10-05

## 2023-10-05 DIAGNOSIS — G89.18 POST-OP PAIN: Primary | ICD-10-CM

## 2023-10-05 RX ORDER — TRAMADOL HYDROCHLORIDE 50 MG/1
50 TABLET ORAL EVERY 6 HOURS PRN
Qty: 28 TABLET | Refills: 0 | Status: SHIPPED | OUTPATIENT
Start: 2023-10-05 | End: 2023-12-07 | Stop reason: ALTCHOICE

## 2023-10-05 NOTE — PROGRESS NOTES
Patient with persistent pain s/p TKA. Spoke with Dr. Fuentes. Will reorder Tramadol 50mg q6h PRN pain x28 tabs.

## 2023-10-05 NOTE — TELEPHONE ENCOUNTER
This is a Dr. Fuentes patient and he is out of office. Patient had R TKA done on 9/15/23. She needs a refill on the Tramadol 50mg take one tablet every 6 hours as needed for pain. She only has 2 pills left. Her pharmacy is [facility] in [place] and is on file.

## 2023-10-07 PROBLEM — L28.0 LICHEN SIMPLEX CHRONICUS: Status: ACTIVE | Noted: 2022-08-08

## 2023-10-07 PROBLEM — L90.5 SCAR CONDITION AND FIBROSIS OF SKIN: Status: ACTIVE | Noted: 2022-08-08

## 2023-10-07 PROBLEM — R00.2 PALPITATIONS: Status: ACTIVE | Noted: 2023-10-07

## 2023-10-07 PROBLEM — L90.5 SCAR: Status: ACTIVE | Noted: 2022-08-08

## 2023-10-07 PROBLEM — D22.60 MELANOCYTIC NEVI OF UNSPECIFIED UPPER LIMB, INCLUDING SHOULDER: Status: ACTIVE | Noted: 2022-08-08

## 2023-10-07 PROBLEM — L23.7 ALLERGIC CONTACT DERMATITIS DUE TO PLANTS, EXCEPT FOOD: Status: ACTIVE | Noted: 2022-08-08

## 2023-10-07 PROBLEM — L73.8 OTHER SPECIFIED FOLLICULAR DISORDERS: Status: ACTIVE | Noted: 2022-08-08

## 2023-10-07 PROBLEM — D18.01 HEMANGIOMA OF SKIN AND SUBCUTANEOUS TISSUE: Status: ACTIVE | Noted: 2022-08-08

## 2023-10-07 PROBLEM — L81.4 OTHER MELANIN HYPERPIGMENTATION: Status: ACTIVE | Noted: 2022-08-08

## 2023-10-07 PROBLEM — Z85.828 PERSONAL HISTORY OF OTHER MALIGNANT NEOPLASM OF SKIN: Status: ACTIVE | Noted: 2022-08-08

## 2023-10-07 PROBLEM — D22.4 MELANOCYTIC NEVI OF SCALP AND NECK: Status: ACTIVE | Noted: 2022-08-08

## 2023-10-07 PROBLEM — L73.8 BACTERIAL FOLLICULITIS: Status: ACTIVE | Noted: 2022-08-08

## 2023-10-07 PROBLEM — L82.1 OTHER SEBORRHEIC KERATOSIS: Status: ACTIVE | Noted: 2022-08-08

## 2023-10-07 PROBLEM — C44.529 SQUAMOUS CELL CARCINOMA OF SKIN OF OTHER PART OF TRUNK: Status: ACTIVE | Noted: 2022-08-08

## 2023-10-07 PROBLEM — D22.70 MELANOCYTIC NEVI OF UNSPECIFIED LOWER LIMB, INCLUDING HIP: Status: ACTIVE | Noted: 2022-08-08

## 2023-10-07 PROBLEM — L91.0 HYPERTROPHIC SCAR: Status: ACTIVE | Noted: 2022-08-08

## 2023-10-07 PROBLEM — L28.1 PRURIGO NODULARIS: Status: ACTIVE | Noted: 2022-08-08

## 2023-10-07 PROBLEM — D48.5 NEOPLASM OF UNCERTAIN BEHAVIOR OF SKIN: Status: ACTIVE | Noted: 2022-08-08

## 2023-10-07 PROBLEM — L71.9 ROSACEA, UNSPECIFIED: Status: ACTIVE | Noted: 2022-08-08

## 2023-10-07 PROBLEM — L57.9 SKIN CHANGES DUE TO CHRONIC EXPOSURE TO NONIONIZING RADIATION, UNSPECIFIED: Status: ACTIVE | Noted: 2022-08-08

## 2023-10-07 PROBLEM — D22.5 MELANOCYTIC NEVI OF TRUNK: Status: ACTIVE | Noted: 2022-08-08

## 2023-10-07 PROBLEM — D49.2 NEOPLASM OF UNSPECIFIED BEHAVIOR OF BONE, SOFT TISSUE, AND SKIN: Status: ACTIVE | Noted: 2022-08-08

## 2023-10-08 PROBLEM — M75.82 TENDINITIS OF LEFT ROTATOR CUFF: Status: ACTIVE | Noted: 2023-10-08

## 2023-10-08 PROBLEM — M21.42 ACQUIRED PES PLANUS OF BOTH FEET: Status: ACTIVE | Noted: 2023-06-14

## 2023-10-08 PROBLEM — M17.12 OSTEOARTHRITIS OF LEFT KNEE: Status: ACTIVE | Noted: 2023-10-08

## 2023-10-08 PROBLEM — S83.511A RUPTURE OF ANTERIOR CRUCIATE LIGAMENT OF RIGHT KNEE: Status: ACTIVE | Noted: 2023-10-08

## 2023-10-08 PROBLEM — M17.12 ARTHRITIS OF KNEE, LEFT: Status: ACTIVE | Noted: 2023-10-08

## 2023-10-08 RX ORDER — CHLORHEXIDINE GLUCONATE ORAL RINSE 1.2 MG/ML
SOLUTION DENTAL
COMMUNITY
Start: 2023-08-29 | End: 2023-10-19 | Stop reason: ALTCHOICE

## 2023-10-08 RX ORDER — AZELAIC ACID 0.15 G/G
1 GEL TOPICAL
COMMUNITY
Start: 2016-01-27 | End: 2023-10-19 | Stop reason: ALTCHOICE

## 2023-10-08 RX ORDER — CLINDAMYCIN PHOSPHATE 10 UG/ML
1 LOTION TOPICAL
COMMUNITY
Start: 2015-07-29 | End: 2023-10-19 | Stop reason: ALTCHOICE

## 2023-10-08 RX ORDER — ALBUTEROL SULFATE 0.83 MG/ML
2.5 SOLUTION RESPIRATORY (INHALATION)
COMMUNITY

## 2023-10-08 RX ORDER — BIMATOPROST 3 UG/ML
SOLUTION TOPICAL
COMMUNITY
Start: 2022-12-16 | End: 2023-10-23 | Stop reason: SDUPTHER

## 2023-10-08 RX ORDER — METRONIDAZOLE 7.5 MG/G
1 GEL TOPICAL
COMMUNITY
Start: 2016-02-15 | End: 2023-10-19 | Stop reason: ALTCHOICE

## 2023-10-08 RX ORDER — LISINOPRIL 5 MG/1
5 TABLET ORAL DAILY
COMMUNITY
End: 2023-12-07 | Stop reason: WASHOUT

## 2023-10-08 RX ORDER — FLUOCINONIDE 0.5 MG/G
1 CREAM TOPICAL
COMMUNITY
Start: 2019-12-03 | End: 2023-10-19 | Stop reason: ALTCHOICE

## 2023-10-08 RX ORDER — TRIAMCINOLONE ACETONIDE 1 MG/G
1 CREAM TOPICAL
COMMUNITY
Start: 2022-08-23 | End: 2023-10-19 | Stop reason: ALTCHOICE

## 2023-10-08 RX ORDER — NITROFURANTOIN 25; 75 MG/1; MG/1
1 CAPSULE ORAL 2 TIMES DAILY
COMMUNITY
Start: 2023-08-31 | End: 2023-10-19 | Stop reason: ALTCHOICE

## 2023-10-08 RX ORDER — METRONIDAZOLE 7.5 MG/G
1 CREAM TOPICAL
COMMUNITY
Start: 2016-10-05 | End: 2023-10-19 | Stop reason: ALTCHOICE

## 2023-10-08 RX ORDER — CYCLOSPORINE 0.5 MG/ML
1 EMULSION OPHTHALMIC 2 TIMES DAILY
COMMUNITY
Start: 2022-12-06 | End: 2023-10-19 | Stop reason: ALTCHOICE

## 2023-10-08 RX ORDER — HYDROCORTISONE 25 MG/G
1 CREAM TOPICAL
COMMUNITY
Start: 2018-05-30 | End: 2023-10-19 | Stop reason: ALTCHOICE

## 2023-10-08 RX ORDER — CLOBETASOL PROPIONATE 0.05 MG/G
1 GEL TOPICAL
COMMUNITY
Start: 2018-05-30 | End: 2023-10-19 | Stop reason: ALTCHOICE

## 2023-10-08 RX ORDER — METOPROLOL SUCCINATE 25 MG/1
25 TABLET, EXTENDED RELEASE ORAL DAILY
COMMUNITY
End: 2023-10-19 | Stop reason: ALTCHOICE

## 2023-10-08 RX ORDER — AZELAIC ACID 0.15 G/G
1 AEROSOL, FOAM TOPICAL
COMMUNITY
Start: 2018-04-20 | End: 2023-10-19 | Stop reason: ALTCHOICE

## 2023-10-09 ENCOUNTER — EVALUATION (OUTPATIENT)
Dept: PHYSICAL THERAPY | Facility: CLINIC | Age: 58
End: 2023-10-09
Payer: COMMERCIAL

## 2023-10-09 DIAGNOSIS — Z96.651 S/P REVISION OF TOTAL KNEE, RIGHT: ICD-10-CM

## 2023-10-09 DIAGNOSIS — Z74.09 IMPAIRED FUNCTIONAL MOBILITY AND ACTIVITY TOLERANCE: Primary | ICD-10-CM

## 2023-10-09 PROCEDURE — 97110 THERAPEUTIC EXERCISES: CPT | Mod: GP

## 2023-10-09 PROCEDURE — 97161 PT EVAL LOW COMPLEX 20 MIN: CPT | Mod: GP

## 2023-10-09 NOTE — PROGRESS NOTES
Physical Therapy Evaluation    Patient Name: Bree Bojorquez  MRN: [id number]  Today's Date: 10/9/2023  Visit: 1    Referred by: ARGELIA Fuentes      Diagnosis: right TKA     PRECAUTIONS:   No precautions     SUBJECTIVE:    Pain:  5/10 right knee. Tight. Sore.     Home Living:  Multi level home,  home.   Prior level of function:  INDP     OBJECTIVE:  Incision healing with areas of scabbing. No signs of infection or drainage.   Negative homans kamla.     LEFS 27/80    Knee ROM 7-98 supine  After exercise ROM 3-103    SLR assisted only   Edema   right proximal incision 43.7  KJL 39.8  Distal incision 35.0    Left knee OA, pain crepitus     RLE MMT   Hip flex 3+/5  Hip add 4/5, abd 4-/5  Hip ext 4/5  Hamstring 3+/5  Quad 3+/5  DF PF 4/5    LLE MMT  4/5     Ambulation cane mod indp antalgic, reduced WB, reduced TKE   Non recp ambulation on stairs  No SLS rylee, UE assist needed.     ASSESSMENT:  Pt with skilled need for PT to return to functional indp. Pt will need guided PT for improved ROM, strength, gait, and balance. Pt is currently amb with a cane mod indp with significant antalgia. Pt is motivated. Pt is a good rehab candidate. Pt has left TKA pending December 2023. Pt with good rylee of exercise to improve ROM.     TREATMENT:  [facility] AA knee program x 12   Bike 4 min recp  CP x 10 min supine     PATIENT EDUCATION:  HEP  goals  safety  POC  interventions selected      Access Code: [email or web address]  URL: [email or web address]  Date: 10/09/2023  Prepared by: Vidya Sánchez    Exercises  - Supine Quad Set  - 1 x daily - 7 x weekly - 2 sets - 10 reps - 5 hold  - Supine Heel Slide  - 1 x daily - 7 x weekly - 2 sets - 10 reps  - Supine Bridge  - 1 x daily - 7 x weekly - 2 sets - 10 reps  - Small Range Straight Leg Raise  - 1 x daily - 7 x weekly - 2 sets - 10 reps  - Supine Knee Extension Strengthening  - 1 x daily - 7 x weekly - 2 sets - 10 reps  - Seated Long Arc Quad  - 1 x daily - 7 x weekly - 2 sets - 10 reps  -  Supine Hip Abduction  - 1 x daily - 7 x weekly - 2 sets - 10 reps  - Seated March  - 1 x daily - 7 x weekly - 2 sets - 10 reps  - Sit to Stand  - 1 x daily - 7 x weekly - 2 sets - 10 reps    PLAN:     Pt to be seen 2 times per week for 8 weeks.   Pt POC to include:  Therapeutic EX, Therapeutic ACT, NMR, Self care, Manual therapy, Gait training, CP/MHP, Education    Rehab potential:  Good    Plan of care agreement  Patient     GOALS:  Encounter Problems       Encounter Problems (Active)       PT Problem       PT Goal 1       Start:  10/09/23    Expected End:  12/08/23       1) Pain will be reduced to 0/10 at rest no more than 2/10 with activity.   2) Function will be increased to be able to complete all household tasks, walking, driving INDP   3) ROM will be increased to be WNL at 0-115  4) Strength to be increased to be WNL at 4+/5  5) Independent in Home Exercise Program  6) Independent return to community activity, driving.   7) ambulation safely INDP non antalgic ally community distances.   8) recp stair ambulation MOD INDP 1 HR safely                Patient Stated Goal 1       Start:  10/09/23    Expected End:  12/08/23       Watch [employer], walk and carry [employer]          Patient will be able to walk to return to activity       Start:  10/09/23    Expected End:  12/08/23       INPD for exercise 4 x week.          Patient will be independent in instrumental activities of daily living       Start:  10/09/23    Expected End:  12/08/23            Patient will show a significant change in LEFS  patient reported outcome tool to demonstrate subjective imporovement       Start:  10/09/23    Expected End:  12/08/23       60/80 or greater

## 2023-10-11 ENCOUNTER — TREATMENT (OUTPATIENT)
Dept: PHYSICAL THERAPY | Facility: CLINIC | Age: 58
End: 2023-10-11
Payer: COMMERCIAL

## 2023-10-11 DIAGNOSIS — M17.12 OSTEOARTHRITIS OF LEFT KNEE: Primary | ICD-10-CM

## 2023-10-11 DIAGNOSIS — Z74.09 IMPAIRED FUNCTIONAL MOBILITY AND ACTIVITY TOLERANCE: ICD-10-CM

## 2023-10-11 DIAGNOSIS — Z96.651 S/P REVISION OF TOTAL KNEE, RIGHT: ICD-10-CM

## 2023-10-11 PROCEDURE — 97110 THERAPEUTIC EXERCISES: CPT | Mod: GP

## 2023-10-11 NOTE — PROGRESS NOTES
Physical Therapy Treatment    Patient Name: Bree Bojorquez  MRN: [id number]  Today's Date: 10/11/2023  Diagnosis:   1. Osteoarthritis of left knee        2. S/P revision of total knee, right        3. Impaired functional mobility and activity tolerance          Visit # 2    PRECAUTIONS:  Left knee OA     SUBJECTIVE:  Continues to have nausea. Pt with left knee pain. Doing hEP.     OBJECTIVE:      TREATMENT:  - Therex: 40 min.   Bike upright 8 min level 1 for ROM  X 15 kamla:  Step taps 8 inch kamla   4 inch step ups  Lateral 4 inch step ups  Hip abduction  Hip extension  Cone step over lateral, AP    Stair stretches - hip flex, hamstring, gastroc 3 x 20 sec kamla     Supine 2 x 10 kamla:  SLR  sAQ   Hip abduction  LAQ   Sit to stand elevated mat     - Manual Therapy:  AA ROM right LE   - Neuromuscular Re-education: 5 min.   Step thru movement patterns   - Gait Train:  Gait cues for step thru   - Modalities:    CP x 10 min supine     ASSESSMENT:  Pt with good rylee of activity with ability to adjust to cues. Pt with tactile cues and assist for knee flexion into step up and with transfers. Pt with tendency to not flex RLE. Challenged, fatigued. SLR assist needed.      PLAN:    Progress toward goals for ROM, strength, and focus on gait technique.

## 2023-10-12 ENCOUNTER — APPOINTMENT (OUTPATIENT)
Dept: PHYSICAL THERAPY | Facility: CLINIC | Age: 58
End: 2023-10-12

## 2023-10-12 ENCOUNTER — TELEPHONE (OUTPATIENT)
Dept: ORTHOPEDIC SURGERY | Facility: CLINIC | Age: 58
End: 2023-10-12
Payer: COMMERCIAL

## 2023-10-12 NOTE — TELEPHONE ENCOUNTER
Patient called in requesting a medication refill of Zofran. Patient states it has been helping her when taking her pain medication because she has been getting nauseas. Can we send a refill to her pharmacy on file?    [facility] in  [place]

## 2023-10-13 ENCOUNTER — APPOINTMENT (OUTPATIENT)
Dept: PHYSICAL THERAPY | Facility: CLINIC | Age: 58
End: 2023-10-13

## 2023-10-16 DIAGNOSIS — M17.11 PRIMARY OSTEOARTHRITIS OF RIGHT KNEE: Primary | ICD-10-CM

## 2023-10-16 RX ORDER — ONDANSETRON 4 MG/1
TABLET, ORALLY DISINTEGRATING ORAL
Qty: 20 TABLET | Refills: 0 | Status: SHIPPED | OUTPATIENT
Start: 2023-10-16 | End: 2023-12-07 | Stop reason: WASHOUT

## 2023-10-18 ENCOUNTER — TREATMENT (OUTPATIENT)
Dept: PHYSICAL THERAPY | Facility: CLINIC | Age: 58
End: 2023-10-18
Payer: COMMERCIAL

## 2023-10-18 DIAGNOSIS — Z74.09 IMPAIRED FUNCTIONAL MOBILITY AND ACTIVITY TOLERANCE: Primary | ICD-10-CM

## 2023-10-18 DIAGNOSIS — Z96.651 S/P TOTAL KNEE ARTHROPLASTY, RIGHT: ICD-10-CM

## 2023-10-18 PROBLEM — Z96.652 HISTORY OF TOTAL KNEE ARTHROPLASTY, LEFT: Status: RESOLVED | Noted: 2023-10-18 | Resolved: 2023-10-18

## 2023-10-18 PROBLEM — Z96.652 HISTORY OF TOTAL KNEE ARTHROPLASTY, LEFT: Status: ACTIVE | Noted: 2023-10-18

## 2023-10-18 PROCEDURE — 97110 THERAPEUTIC EXERCISES: CPT | Mod: GP

## 2023-10-18 NOTE — PROGRESS NOTES
Physical Therapy Treatment    Patient Name: Bree Bojorquez  MRN: [id number]  Today's Date: 10/18/2023  Diagnosis: Right TKA     Visit # 3    PRECAUTIONS:  Left knee OA     SUBJECTIVE:  Has less nausea, off meds. Took a spill outside yesterday. Did not land on RLE. Fell on wet leaves.     OBJECTIVE:  2-115 supine AROM     TREATMENT:  - Therex: 45 min.   Bike upright 8 min level 1 for ROM  X 15 kamla:  Step taps 8 inch kamla   4 inch step ups  Lateral 4 inch step ups  Hip abduction  Hip extension  Cone step over lateral, AP    Stair stretches - hip flex, hamstring, gastroc 3 x 20 sec kamla     Supine 2 x 10 kamla:  SLR  sAQ   Hip abduction  LAQ   Sit to stand elevated mat     - Manual Therapy:  AA ROM right LE   - Neuromuscular Re-education: 5 min.   Step thru movement patterns   - Gait Train:  Gait cues for step thru   - Modalities:    CP x 10 min supine     ASSESSMENT:  Pt is progressing well. Improved rylee of activity. Pt with improved rylee of AROM. Unable to do SLR INDP with good technique, requires assist. Pt with less reports of pain and improved reaction to exercise. Pt with improved WB thru RLE.     PLAN:    Progress toward goals for ROM, strength, and focus on gait technique.

## 2023-10-19 DIAGNOSIS — R11.0 NAUSEA: Primary | ICD-10-CM

## 2023-10-19 RX ORDER — SCOLOPAMINE TRANSDERMAL SYSTEM 1 MG/1
1 PATCH, EXTENDED RELEASE TRANSDERMAL
Qty: 4 PATCH | Refills: 2 | Status: SHIPPED | OUTPATIENT
Start: 2023-10-19 | End: 2023-12-07 | Stop reason: WASHOUT

## 2023-10-20 ENCOUNTER — TREATMENT (OUTPATIENT)
Dept: PHYSICAL THERAPY | Facility: CLINIC | Age: 58
End: 2023-10-20
Payer: COMMERCIAL

## 2023-10-20 DIAGNOSIS — Z96.651 S/P TOTAL KNEE ARTHROPLASTY, RIGHT: Primary | ICD-10-CM

## 2023-10-20 DIAGNOSIS — Z74.09 IMPAIRED FUNCTIONAL MOBILITY AND ACTIVITY TOLERANCE: ICD-10-CM

## 2023-10-20 PROCEDURE — 97110 THERAPEUTIC EXERCISES: CPT | Mod: GP

## 2023-10-20 NOTE — PROGRESS NOTES
Physical Therapy Treatment    Patient Name: Bree Bojorquez  MRN: [id number]  Today's Date: 10/20/2023  Diagnosis: Right TKA     Visit # 4    PRECAUTIONS:  Left knee OA     SUBJECTIVE:  Has muscle soreness only. Feels the knee is stiff and she is working on the stiffness/tightness. Is still dealing with the nausea.     OBJECTIVE:  2-115 supine AROM     TREATMENT:  - Therex: 45 min.   Bike upright 8 min level 1 for ROM    2 x 10 kamla:  Step taps 8 inch kamla   4 inch step ups  Lateral 4 inch step ups  Hip abduction  Hip extension  Cone step over lateral, AP    Stair stretches - hip flex, hamstring, gastroc 3 x 20 sec kamla     Supine 3 x 10 kamla:  SLR  sAQ   Hip abduction  LAQ   Sit to stand elevated mat     - Manual Therapy:  AA ROM right LE   - Neuromuscular Re-education: 5 min.   Step thru movement patterns   - Gait Train:  Gait cues for step thru   - Modalities:    CP x 10 min supine     ASSESSMENT:  Pt with good rylee of exercise. Pt cued for sequencing of step up and step over. Pt with improved overall tolerance. Mild fatigue with CKC. Pt with good ROM progression. Pt with improved sit to stand. Pt has tendency to overuse the right hip flexor to achieve knee flexion.      PLAN:    Progress toward goals for ROM, strength, and focus on gait technique.

## 2023-10-23 DIAGNOSIS — H02.729 HYPOTRICHOSIS OF EYELID, UNSPECIFIED LATERALITY: Primary | ICD-10-CM

## 2023-10-23 RX ORDER — BIMATOPROST 3 UG/ML
SOLUTION TOPICAL
Qty: 3 ML | Refills: 5 | Status: SHIPPED | OUTPATIENT
Start: 2023-10-23

## 2023-10-25 ENCOUNTER — TREATMENT (OUTPATIENT)
Dept: PHYSICAL THERAPY | Facility: CLINIC | Age: 58
End: 2023-10-25
Payer: COMMERCIAL

## 2023-10-25 DIAGNOSIS — Z96.651 S/P TOTAL KNEE ARTHROPLASTY, RIGHT: Primary | ICD-10-CM

## 2023-10-25 DIAGNOSIS — S83.511D ANTERIOR CRUCIATE LIGAMENT COMPLETE TEAR, RIGHT, SUBSEQUENT ENCOUNTER: ICD-10-CM

## 2023-10-25 PROCEDURE — 97110 THERAPEUTIC EXERCISES: CPT | Mod: GP

## 2023-10-25 NOTE — PROGRESS NOTES
Physical Therapy Treatment    Patient Name: Bree Bojorquez  MRN: [id number]  Today's Date: 10/25/2023  Diagnosis: Right TKA     Visit # 5    PRECAUTIONS:  Left knee OA     SUBJECTIVE:  Pt is stiff. AM is tough. Is working hard at home. Had a sense of her knee moving or giving on her when moving quickly.     OBJECTIVE:      TREATMENT:  - Therex: 45 min.   Bike upright 8 min level 1 for ROM    2 x 10 kamla:  Step taps 8 inch kamla   4 inch step ups  Lateral 4 inch step ups  Hip abduction  Hip extension  Cone step over lateral, AP    Stair stretches - hip flex, hamstring, gastroc 3 x 20 sec kamla     Supine 3 x 10 kamla:  SLR 1#  sAQ 2#  Hip abduction  LAQ 2#  Sit to stand elevated mat     TG 2 x 12     - Manual Therapy:  AA ROM right LE   Gait cues for step thru   - Modalities:    CP x 10 min supine     ASSESSMENT:  Pt with good rylee of dynamic exercise. Pt able to progress with resistance of LE strength exercise. Pt with improved ability today. Pt with discomfort with extension stretches. Relief with CP. Pt with improved rylee of progression. Rylee well. Cued for frequency at home for TKE.     PLAN:    Progress toward goals for ROM, strength, and focus on gait technique.

## 2023-10-26 ENCOUNTER — PREP FOR PROCEDURE (OUTPATIENT)
Dept: ORTHOPEDIC SURGERY | Facility: CLINIC | Age: 58
End: 2023-10-26

## 2023-10-26 ENCOUNTER — OFFICE VISIT (OUTPATIENT)
Dept: ORTHOPEDIC SURGERY | Facility: CLINIC | Age: 58
End: 2023-10-26
Payer: COMMERCIAL

## 2023-10-26 VITALS — BODY MASS INDEX: 26.37 KG/M2 | HEIGHT: 68 IN | WEIGHT: 174 LBS

## 2023-10-26 DIAGNOSIS — M17.0 PRIMARY OSTEOARTHRITIS OF BOTH KNEES: ICD-10-CM

## 2023-10-26 DIAGNOSIS — M25.362 INSTABILITY OF LEFT KNEE JOINT: ICD-10-CM

## 2023-10-26 DIAGNOSIS — M17.0 PRIMARY OSTEOARTHRITIS OF BOTH KNEES: Primary | ICD-10-CM

## 2023-10-26 PROCEDURE — 1036F TOBACCO NON-USER: CPT | Performed by: ORTHOPAEDIC SURGERY

## 2023-10-26 PROCEDURE — 99024 POSTOP FOLLOW-UP VISIT: CPT | Performed by: ORTHOPAEDIC SURGERY

## 2023-10-26 PROCEDURE — 99214 OFFICE O/P EST MOD 30 MIN: CPT | Performed by: ORTHOPAEDIC SURGERY

## 2023-10-26 NOTE — PROGRESS NOTES
POST-OPERATIVE FOLLOW UP      ============================  IMPRESSION/PLAN:  ============================  57 y.o. female s/p Right TKA completed on 9/15/23   Primary osteoarthritis, left knee    PLAN: For right knee  -Weightbearing: Weight bearing as tolerated  -DVT Prophylaxis: No longer needed  -Radiology Studies: None today  -Therapies: Continue physical therapy okay to transition to home exercise program  -We will arrange follow-up after left knee replacement    PLAN: For left knee  Bree Bojorquez has radiographic and physical exam evidence of degenerative joint disease and wishes to pursue surgery. The patient appears to have sufficient symptoms to warrant surgical intervention and is an appropriate candidate for  LEFT Total Knee Arthroplasty as evidenced by six months of unsuccessful non-operative treatment as outlined in the HPI, progressive symptoms including pain impacting sleep or causing fatigue, pain impacting work, pain worsened with weight bearing, and pain limiting ability to stay fit and healthy.     We had a lengthy discussion regarding the risk and benefit of surgery, the alternatives, limitations, and personnel involved. The include but are not limited to infection, persistent pain, instability, nerve injury, blood clots, and medical complications. We also discussed the pre-operative course, surgery itself, and rehabilitation. Perioperative blood management and transfusion issues were discussed, and options clearly outlined. The patient consented to the use of allogenic blood if medically necessary.     The patient has elected to schedule surgery at this time or intents to call the office with a surgical date. Shared decision making occurred while obtaining informed consent. The patient with be scheduled for a pre-operative education class. The patient will be ordered appropriate preoperative labs to be completed for preadmission testing.     Robotic Assisted Surgery: Yes. The use of robotic  assisted surgery was discussed with the patient.  The risk, benefits were discussed regarding this.  Patient consented for this procedure.  We discussed specifically placement of pins and arrays for navigation during surgery and to help with the robotic assistance.  We discussed the use of an additional bandage to cover the pin sites.  We also reviewed that they may have some slight pain at the placement of pin sites that should improve in the same fashion as their general recovery of the joint replacement.    We discussed the term robot, when used to describe the ABDI system, refers to the Nevro robotic arm. The ABDI System is not a robot, but a surgeon-controlled robotic-arm assisted device.      - Preoperative Consults: PAT Clearance   - Disposition: 23-hour observation  - Anesthesia Plan: Preoperative block, spinal, local  - Implants: Vesna CS, Abdi  - Physical Therapy Plan: Home  - [id number]: Yes, no postoperative oxycodone  - Surgical Approach: Subvastus  - DVT PPx: Aspirin    Risk Assessment for Post-Op Antibiotics   - Diabetes Mellitus: Yes    I hereby indicate that these comorbidities may have a detrimental effect on this arthroplasty.   None present          Bree Bojorquez presents today for follow up of the above operation. Pain controlled with current treatment plan. Patient has begun physical therapy. They are currently doing therapy at [facility]. They are currently ambulating with Cane.  Patient additionally has a history of left knee osteoarthritis that is failed conservative treatment including rest, anti-inflammatories, previous corticosteroid injections, physical therapy for greater than 6 weeks.  Would like to proceed with left knee arthroplasty given that she is doing well with the right knee.    Review of Systems:   Constitutional: See HPI for pain assessment, No significant weight loss, recent trauma. Denies fevers/chills  Cardiovascular: No chest pain, shortness of breath  Respiratory:  No difficulty breathing, cough  Gastrointestinal: No nausea, vomiting, diarrhea, constipation  Musculoskeletal: Noted in HPI, no arthralgias   Integumentary: No rashes, easy bruising, redness   Neurological: no numbness or tingling in extremities, no gait disturbances     Patient Active Problem List   Diagnosis    Anxiety, generalized    Arthritis of knee, right    Benign essential hypertension    Chronic pain of both knees    Elevated alkaline phosphatase level    Excessive sweating    Eyelash scantiness    Acquired pes planus of both feet    Hot flashes, menopausal    Overweight (BMI 25.0-29.9)    Prediabetes    Primary osteoarthritis of both knees    Rotator cuff tendinitis    Tear of left rotator cuff    Vitamin D deficiency    Actinic keratosis    Anterior cruciate ligament complete tear, right, subsequent encounter    Congenital deformities of feet    Insulin resistance    Mixed hyperlipidemia    Acute non-recurrent sinusitis    Allergic contact dermatitis due to plants, except food    Hemangioma of skin and subcutaneous tissue    Other specified follicular disorders    Scar condition and fibrosis of skin    Hypertrophic scar    Lichen simplex chronicus    Melanocytic nevi of scalp and neck    Melanocytic nevi of trunk    Melanocytic nevi of unspecified lower limb, including hip    Melanocytic nevi of unspecified upper limb, including shoulder    Neoplasm of uncertain behavior of skin    Neoplasm of unspecified behavior of bone, soft tissue, and skin    Other melanin hyperpigmentation    Personal history of other malignant neoplasm of skin    Prurigo nodularis    Rosacea, unspecified    Squamous cell carcinoma of skin of other part of trunk    Other seborrheic keratosis    Skin changes due to chronic exposure to nonionizing radiation, unspecified    Palpitations    Scar    Bacterial folliculitis    Arthritis of knee, left    Tendinitis of left rotator cuff    Osteoarthritis of left knee    Rupture of anterior  cruciate ligament of right knee    Impaired functional mobility and activity tolerance    S/P total knee arthroplasty, right       =================================  EXAM  =================================  GENERAL: A/Ox3, NAD. Appears healthy, well nourished  PSYCHIATRIC: Mood stable, appropriate memory recall  EYES: EOM intact, no scleral icterus  CARDIAC: regular rate  LUNGS: Breathing non-labored  SKIN: no erythema, rashes, or ecchymoses     MUSCULOSKELETAL:  Laterality: left Knee Exam  - Alignment: partially correctible varus deformity  - ROM: 5 to 115 degrees  - Effusion: none  - Strength: knee extension and flexion 5/5, EHL/PF/DF motor intact  - Palpation: TTP along medial, lateral joint line  - Stability: Anterior/Posterior stable, varus/valgus stable  - Gait: normal  - Hip Exam: flexion to 100+ degrees, full extension, internal/external rotation adequate, and no pain with log roll  - Special Tests: none performed    Laterality: left Knee Exam  - Incision: Well-healed  - Palpation: Nontender along knee  - Effusion: Minimal  - ROM: 5 to 115 degrees  - Stability: Anterior/Posterior stable, varus/valgus stable  - compartments soft, negative homans    NEUROVASCULAR:  - Neurovascular Status: sensation intact to light touch distally  - Capillary refill brisk at extremities, Bilateral dorsalis pedis pulse 2+    Imaging: Multiple views of the left knee done previously reviewed which demonstrate severe arthritic changes noted with complete medial compartment joint space narrowing, subchondral sclerosis, subchondral cystic change, marginal osteophytic change and varus deformity. X-rays were personally reviewed by me.  Radiology reports were reviewed by me as well, if available at the time.        Lisa Fuentes DO  Attending Surgeon  Joint Replacement and Adult Reconstructive Surgery  [place], [place]

## 2023-10-27 ENCOUNTER — TREATMENT (OUTPATIENT)
Dept: PHYSICAL THERAPY | Facility: CLINIC | Age: 58
End: 2023-10-27
Payer: COMMERCIAL

## 2023-10-27 ENCOUNTER — OFFICE VISIT (OUTPATIENT)
Dept: PRIMARY CARE | Facility: CLINIC | Age: 58
End: 2023-10-27
Payer: COMMERCIAL

## 2023-10-27 DIAGNOSIS — Z23 ENCOUNTER FOR IMMUNIZATION: Primary | ICD-10-CM

## 2023-10-27 DIAGNOSIS — Z96.651 S/P TOTAL KNEE ARTHROPLASTY, RIGHT: Primary | ICD-10-CM

## 2023-10-27 DIAGNOSIS — Z74.09 IMPAIRED FUNCTIONAL MOBILITY AND ACTIVITY TOLERANCE: ICD-10-CM

## 2023-10-27 PROCEDURE — 90686 IIV4 VACC NO PRSV 0.5 ML IM: CPT | Performed by: STUDENT IN AN ORGANIZED HEALTH CARE EDUCATION/TRAINING PROGRAM

## 2023-10-27 PROCEDURE — 1036F TOBACCO NON-USER: CPT | Performed by: STUDENT IN AN ORGANIZED HEALTH CARE EDUCATION/TRAINING PROGRAM

## 2023-10-27 PROCEDURE — 97110 THERAPEUTIC EXERCISES: CPT | Mod: GP

## 2023-10-27 PROCEDURE — 90471 IMMUNIZATION ADMIN: CPT | Performed by: STUDENT IN AN ORGANIZED HEALTH CARE EDUCATION/TRAINING PROGRAM

## 2023-10-27 NOTE — PROGRESS NOTES
Physical Therapy Treatment    Patient Name: Bree Bojorquez  MRN: [id number]  Today's Date: 10/27/2023  Diagnosis: Right TKA     Visit # 6    PRECAUTIONS:  Left knee OA     SUBJECTIVE:  Pt with some fatigue. Saw MD, had a good check up. Good to continue with POC.     OBJECTIVE:  Extension supine 3    TREATMENT:  - Therex: 45 min.   Bike upright 8 min level 1 for ROM    2 x 10 kamla:  Step taps 8 inch kamla   6 inch step ups  Lateral 4 inch step ups  Hip abduction  Hip extension  Cone step over lateral, AP    Stair stretches - hip flex, hamstring, gastroc 3 x 20 sec kamla     Supine 3 x 10 kamla:  SLR 1#  sAQ 2#  Hip abduction  LAQ 2#  Sit to stand elevated mat     TG 2 x 12     - Manual Therapy:  AA ROM right LE   Gait cues for step thru   - Modalities:    CP x 10 min supine     ASSESSMENT:  Improved joint play into extension. Improved ROM. Pt rylee all activity much better today. Significant improvement in movement patterns. Reduced antalgia with step ups. Progressing well. Noted improvement in rylee of activity requiring less rest breaks as well as only 1 UE assist.     PLAN:    Progress toward goals for ROM, strength, and focus on gait technique.

## 2023-10-27 NOTE — PROGRESS NOTES
Orders Placed This Encounter   Procedures    Flu vaccine (IIV4) age 6 months and greater, preservative free     Immunization side effects explained and injection was given without complication.

## 2023-11-01 ENCOUNTER — TREATMENT (OUTPATIENT)
Dept: PHYSICAL THERAPY | Facility: CLINIC | Age: 58
End: 2023-11-01
Payer: COMMERCIAL

## 2023-11-01 DIAGNOSIS — Z96.651 S/P TOTAL KNEE ARTHROPLASTY, RIGHT: Primary | ICD-10-CM

## 2023-11-01 DIAGNOSIS — Z74.09 IMPAIRED FUNCTIONAL MOBILITY AND ACTIVITY TOLERANCE: ICD-10-CM

## 2023-11-01 PROCEDURE — 97110 THERAPEUTIC EXERCISES: CPT | Mod: GP

## 2023-11-01 NOTE — PROGRESS NOTES
Physical Therapy Treatment    Patient Name: Bree Bojorquez  MRN: [id number]  Today's Date: 11/1/2023  Diagnosis: Right TKA     Visit # 7    PRECAUTIONS:  Left knee OA     SUBJECTIVE:  Stiff and tight this AM. Is cleared to fly for the weekend to an event. Reports some nervousness about the trip.     OBJECTIVE:  Extension supine 3    TREATMENT:  - Therex: 45 min.   Bike upright 8 min level 1 for ROM    2 x 10 kamla:  Step taps 8 inch kamla   6 inch step ups  Lateral 4 inch step ups  Hip abduction  Hip extension  Cone step over lateral, AP    Stair stretches - hip flex, hamstring, gastroc 3 x 20 sec kamla     Supine 3 x 10 kamla:  SLR 1#  sAQ 2#  Hip abduction  LAQ 2#  Sit to stand elevated mat     TG 2 x 12     Stair training - recp up and down 12 x 2 mod indp    - Manual Therapy:  AA ROM right LE     - Modalities:    CP x 10 min supine     ASSESSMENT:  Pt with improved rylee of activity. Able to complete stairs mod indp recp with hesitation. Pt with improved rylee of WB exercise and eccentric loading. Pt will NMR for eccentric and flexion of RLE in WB. Pt rylee well with mild fatigue. Pt advised in exercise and activity to reduce stiffness at home.     PLAN:    Progress toward goals for ROM, strength, and focus on gait technique.

## 2023-11-03 ENCOUNTER — APPOINTMENT (OUTPATIENT)
Dept: PHYSICAL THERAPY | Facility: CLINIC | Age: 58
End: 2023-11-03
Payer: COMMERCIAL

## 2023-11-06 ENCOUNTER — TREATMENT (OUTPATIENT)
Dept: PHYSICAL THERAPY | Facility: CLINIC | Age: 58
End: 2023-11-06
Payer: COMMERCIAL

## 2023-11-06 DIAGNOSIS — Z96.651 S/P TOTAL KNEE ARTHROPLASTY, RIGHT: Primary | ICD-10-CM

## 2023-11-06 DIAGNOSIS — Z74.09 IMPAIRED FUNCTIONAL MOBILITY AND ACTIVITY TOLERANCE: ICD-10-CM

## 2023-11-06 PROCEDURE — 97140 MANUAL THERAPY 1/> REGIONS: CPT | Mod: GP

## 2023-11-06 PROCEDURE — 97110 THERAPEUTIC EXERCISES: CPT | Mod: GP

## 2023-11-06 NOTE — PROGRESS NOTES
Physical Therapy Treatment    Patient Name: Bree Bojorquez  MRN: [id number]  Today's Date: 11/6/2023  Diagnosis: Right TKA     Visit # 8    PRECAUTIONS:  Left knee OA     SUBJECTIVE:  Returned from trip yesterday. Is very tired and the leg is very painful. Stiff and feels swollen. Toothache like pain.     OBJECTIVE:  Patellar mobility WFL   PROM rylee well WFL mild discomfort into extension     TREATMENT:  - Therex: 20 min   Bike upright 8 min level 1 for ROM    2 x 10 kamla: HELD   Step taps 8 inch kamla   6 inch step ups  Lateral 4 inch step ups  Hip abduction  Hip extension  Cone step over lateral, AP    Stair stretches - hip flex, hamstring, gastroc 3 x 20 sec kamla     Supine 3 x 10 kamla: AA ONLY   SLR   sAQ   Hip abduction  LAQ   Sit to stand elevated mat     TG 2 x 12 HELD     Stair training - recp up and down 12 x 2 mod indp    - Manual Therapy: 20 min   AA ROM right LE   PROM  STM edema massage     - Modalities:    CP x 10 min supine     ASSESSMENT:  Pt with positive response to manual therapy with imrpoved ROM and decreased pain. Pt with improved ROM and rylee of WB post PT. Pt initially was very sore and reduced with modified treatment per above. Rylee well. Gait WFL post PT, mod indp of cane.     PLAN:    Progress toward goals for ROM, strength, and focus on gait technique.

## 2023-11-08 ENCOUNTER — TREATMENT (OUTPATIENT)
Dept: PHYSICAL THERAPY | Facility: CLINIC | Age: 58
End: 2023-11-08
Payer: COMMERCIAL

## 2023-11-08 DIAGNOSIS — Z74.09 IMPAIRED FUNCTIONAL MOBILITY AND ACTIVITY TOLERANCE: ICD-10-CM

## 2023-11-08 DIAGNOSIS — Z96.651 S/P TOTAL KNEE ARTHROPLASTY, RIGHT: Primary | ICD-10-CM

## 2023-11-08 PROCEDURE — 97110 THERAPEUTIC EXERCISES: CPT | Mod: GP

## 2023-11-08 NOTE — PROGRESS NOTES
Physical Therapy Treatment    Patient Name: Bree Bojorquez  MRN: [id number]  Today's Date: 11/8/2023  Diagnosis: Right TKA     Visit # 9    PRECAUTIONS:  Left knee OA     SUBJECTIVE:  Continues to feel very tight and sore. Is having trouble tolerating walking without a limp. Is worried about this step back in her progress.      OBJECTIVE:  No pitting edema  No redness   No signs of infection  Negative homans     TREATMENT:  - Therex: 20 min   Bike upright 8 min level 2 for ROM    2 x 10 kamla:   Step taps 8 inch kamla   6 inch step ups  Lateral 4 inch step ups  Cone step over lateral, AP    Stair stretches - hip flex, hamstring, gastroc 3 x 20 sec kamla     Supine 3 x 10 kamla:   SLR   sAQ   Hip abduction  LAQ   Sit to stand elevated mat     TG 2 x 12 HELD       - Manual Therapy:   AA ROM right LE   PROM    - Modalities:    CP x 10 min supine     ASSESSMENT:  Pt with positive response to exercise, reduced pain. Pt is challenged with extension. Pt with reduced pain significantly post exercise and CP. Pt with AROM WFL, remains painful into extension. Pt did work thru pain to rylee session and resumed exercise well.     PLAN:    Progress toward goals for ROM, strength, and focus on gait technique.

## 2023-11-14 ENCOUNTER — TREATMENT (OUTPATIENT)
Dept: PHYSICAL THERAPY | Facility: CLINIC | Age: 58
End: 2023-11-14
Payer: COMMERCIAL

## 2023-11-14 DIAGNOSIS — Z74.09 IMPAIRED FUNCTIONAL MOBILITY AND ACTIVITY TOLERANCE: ICD-10-CM

## 2023-11-14 DIAGNOSIS — Z96.651 S/P TOTAL KNEE ARTHROPLASTY, RIGHT: Primary | ICD-10-CM

## 2023-11-14 PROCEDURE — 97110 THERAPEUTIC EXERCISES: CPT | Mod: GP

## 2023-11-14 NOTE — PROGRESS NOTES
Physical Therapy Treatment    Patient Name: Bree Bojorquez  MRN: [id number]  Today's Date: 11/14/2023  Diagnosis: Right TKA     Visit # 10    PRECAUTIONS:  Left knee OA     SUBJECTIVE:  Feels as if she may have turned a corner on some of the pain.     OBJECTIVE:        TREATMENT:  - Therex: 20 min   Bike upright 8 min level 2 for ROM    2 x 10 kamla:   Step taps 8 inch kamla   8 inch step ups  Lateral 6 inch step ups  Cone step over lateral, AP    Stair stretches - hip flex, hamstring, gastroc 3 x 20 sec kamla     Supine 3 x 10 kamla:   SLR   sAQ   Hip abduction  LAQ   Sit to stand elevated mat     TG 2 x 12   Air ex step overs kamla   Step taps lateral over air ex     - Manual Therapy:   AA ROM right LE   PROM    - Modalities:    CP x 10 min supine     ASSESSMENT:  Pt with improved rylee of dynamic strength activity. Pt with improved flexion. Pt with improved gait technique with mild deficit on left greater than right. Pt with need for increased RLE strength for functional strength improvement. Pt is progressing with increased CKC.       PLAN:    Progress toward goals for ROM, strength, and focus on gait technique.

## 2023-11-17 ENCOUNTER — TREATMENT (OUTPATIENT)
Dept: PHYSICAL THERAPY | Facility: CLINIC | Age: 58
End: 2023-11-17
Payer: COMMERCIAL

## 2023-11-17 DIAGNOSIS — Z96.651 S/P TOTAL KNEE ARTHROPLASTY, RIGHT: Primary | ICD-10-CM

## 2023-11-17 DIAGNOSIS — Z74.09 IMPAIRED FUNCTIONAL MOBILITY AND ACTIVITY TOLERANCE: ICD-10-CM

## 2023-11-17 PROCEDURE — 97110 THERAPEUTIC EXERCISES: CPT | Mod: GP

## 2023-11-17 NOTE — PROGRESS NOTES
Physical Therapy Treatment    Patient Name: Bree Bojorquez  MRN: [id number]  Today's Date: 11/17/2023  Diagnosis: Right TKA     Visit # 11    PRECAUTIONS:  Left knee OA     SUBJECTIVE:  Tighter with the rain. Feels like things are getting there.    having [place]OL[place] next week, will not be here next week.     OBJECTIVE:  1-118 supine AROM       TREATMENT:  - Therex:   Bike upright 8 min level 2 for ROM    2 x 10 kamla:   Step taps 8 inch kamla   8 inch step ups  Lateral 6 inch step ups  Cone step over lateral, AP    Stair stretches - hip flex, hamstring, gastroc 3 x 20 sec kamla     Supine 3 x 10 kamla:   SLR   sAQ   Hip abduction  LAQ   Sit to stand elevated mat     TG 2 x 12   Air ex step overs kamla   Step taps lateral over air ex     - Manual Therapy:     - Modalities:    CP x 10 min supine     ASSESSMENT:  PT challenged with step over activity and SLS. Eccentric loading with UE assist, rylee well on RLE. Pt is progressing toward all goals. Improved dynamic activity tolerance and balance.   Cued to reduce WB thru arm and reduce reliance on UE for balance.     PLAN:    Progress toward goals for ROM, strength, and focus on gait technique.

## 2023-11-20 ENCOUNTER — TELEPHONE (OUTPATIENT)
Dept: ORTHOPEDIC SURGERY | Facility: CLINIC | Age: 58
End: 2023-11-20
Payer: COMMERCIAL

## 2023-11-20 NOTE — TELEPHONE ENCOUNTER
Patient called in wanting to get a plan in place for her recovery after her total knee scheduled on 12/29/23. She states that she was given the option to be on Meloxicam post op or Celebrex. She would like to continue taking Meloxicam post op since she knows that she can handle taking that. She also wanted to know if she will be able to take Tramadol with Zofran or if she was able to get a patch to help with the nausea. She also wanted to know if there is a way to talk to the Anesthesiologist before the day of her surgery to find out if she can be given the same medication pre op as she was for her last surgery.     Are there any other options for her post op to help with her pain and nausea?

## 2023-11-28 ENCOUNTER — DOCUMENTATION (OUTPATIENT)
Dept: PHYSICAL THERAPY | Facility: CLINIC | Age: 58
End: 2023-11-28
Payer: COMMERCIAL

## 2023-11-28 ENCOUNTER — APPOINTMENT (OUTPATIENT)
Dept: PHYSICAL THERAPY | Facility: CLINIC | Age: 58
End: 2023-11-28
Payer: COMMERCIAL

## 2023-11-28 NOTE — PROGRESS NOTES
Physical Therapy                 Therapy Communication Note    Patient Name: Bree Bojorquez  MRN: [id number]  Today's Date: 11/28/2023     Discipline: Physical Therapy    Missed Visit Reason:  snow     Missed Time: Cancel    Comment:

## 2023-11-30 ENCOUNTER — TREATMENT (OUTPATIENT)
Dept: PHYSICAL THERAPY | Facility: CLINIC | Age: 58
End: 2023-11-30
Payer: COMMERCIAL

## 2023-11-30 DIAGNOSIS — Z96.651 S/P TOTAL KNEE ARTHROPLASTY, RIGHT: Primary | ICD-10-CM

## 2023-11-30 DIAGNOSIS — Z74.09 IMPAIRED FUNCTIONAL MOBILITY AND ACTIVITY TOLERANCE: ICD-10-CM

## 2023-11-30 PROCEDURE — 97110 THERAPEUTIC EXERCISES: CPT | Mod: GP

## 2023-11-30 NOTE — PROGRESS NOTES
Physical Therapy Treatment    Patient Name: Bree Bojorquez  MRN: [id number]  Today's Date: 11/30/2023  Diagnosis: Right TKA     Visit # 12    PRECAUTIONS:  Left knee OA     SUBJECTIVE:  She is doing OK. Is tight and tired at times. Pt  is doing well. Overall, OK. Has medial knee pain.     OBJECTIVE:        TREATMENT:  - Therex:   Bike upright 8 min level 2 for ROM    2 x 10 kamla:     8 inch step ups  Lateral 6 inch step ups  Cone step over lateral, AP  4 inch step tap downs kamla     Stair stretches - hip flex, hamstring, gastroc 3 x 20 sec kamla     Supine 3 x 10 kamla:   SLR   sAQ   Hip abduction  LAQ   Sit to stand elevated mat     TG 2 x 12   Air ex step overs kamla   Step taps lateral over air ex     - Manual Therapy:     - Modalities:    CP x 10 min supine     ASSESSMENT:  Pt limited in rylee of what LLE could do in WB due to pain. Unable to eccentrically load LLE. Pt challenged with current program focused on strength. Challenged with step downs and rylee of WB flexion RLE. Pt progressing well toward goals. Fatigue a factor as session progressed.     PLAN:    Progress toward goals for ROM, strength, and focus on gait technique.

## 2023-12-05 ENCOUNTER — TREATMENT (OUTPATIENT)
Dept: PHYSICAL THERAPY | Facility: CLINIC | Age: 58
End: 2023-12-05
Payer: COMMERCIAL

## 2023-12-05 DIAGNOSIS — Z96.651 S/P TOTAL KNEE ARTHROPLASTY, RIGHT: Primary | ICD-10-CM

## 2023-12-05 DIAGNOSIS — Z74.09 IMPAIRED FUNCTIONAL MOBILITY AND ACTIVITY TOLERANCE: ICD-10-CM

## 2023-12-05 PROCEDURE — 97110 THERAPEUTIC EXERCISES: CPT | Mod: GP

## 2023-12-05 NOTE — PROGRESS NOTES
Physical Therapy Treatment    Patient Name: Bree Bojorquez  MRN: [id number]  Today's Date: 12/5/2023  Diagnosis: Right TKA     Visit # 13    PRECAUTIONS:  Left knee OA     SUBJECTIVE:  Pt with sense of not yet being strong enough. Is using the knee more.     OBJECTIVE:  Antalgic eccentric loading of the knee.     TREATMENT:  - Therex:   Bike upright 8 min level 2 for ROM    2 x 10 kamla:     8 inch step ups  Lateral 6 inch step ups  Cone step over lateral, AP  4 inch step tap downs kamla     Stair stretches - hip flex, hamstring, gastroc 3 x 20 sec kamla     Supine 3 x 10 kamla:   SLR   sAQ  2.5#   Hip abduction  LAQ   Sit to stand elevated mat     TG 2 x 12   Air ex step overs kamla   Step taps lateral over air ex     - Manual Therapy:     - Modalities:    CP x 10 min supine     ASSESSMENT:  Pt rylee well with no reports of pain. Challenged. Fatigued. Pt with difficulty with eccentric loading of RLE. Avoided LLE flexion due to pain and pending TKA left. Pt unable to rylee left SLS. Pt with good improvement in endurance and tolerance. Progressing well.     PLAN:    Progress toward goals for ROM, strength, and focus on gait technique.

## 2023-12-06 ENCOUNTER — APPOINTMENT (OUTPATIENT)
Dept: RADIOLOGY | Facility: HOSPITAL | Age: 58
End: 2023-12-06
Payer: COMMERCIAL

## 2023-12-07 ENCOUNTER — ANCILLARY PROCEDURE (OUTPATIENT)
Dept: RADIOLOGY | Facility: CLINIC | Age: 58
End: 2023-12-07
Payer: COMMERCIAL

## 2023-12-07 ENCOUNTER — APPOINTMENT (OUTPATIENT)
Dept: PHYSICAL THERAPY | Facility: CLINIC | Age: 58
End: 2023-12-07
Payer: COMMERCIAL

## 2023-12-07 ENCOUNTER — OFFICE VISIT (OUTPATIENT)
Dept: OBSTETRICS AND GYNECOLOGY | Facility: CLINIC | Age: 58
End: 2023-12-07
Payer: COMMERCIAL

## 2023-12-07 VITALS
BODY MASS INDEX: 26.98 KG/M2 | HEIGHT: 68 IN | DIASTOLIC BLOOD PRESSURE: 70 MMHG | WEIGHT: 178 LBS | SYSTOLIC BLOOD PRESSURE: 138 MMHG

## 2023-12-07 DIAGNOSIS — Z12.31 VISIT FOR SCREENING MAMMOGRAM: ICD-10-CM

## 2023-12-07 DIAGNOSIS — Z01.419 WELL WOMAN EXAM WITH ROUTINE GYNECOLOGICAL EXAM: Primary | ICD-10-CM

## 2023-12-07 PROCEDURE — 87624 HPV HI-RISK TYP POOLED RSLT: CPT

## 2023-12-07 PROCEDURE — 77067 SCR MAMMO BI INCL CAD: CPT | Performed by: STUDENT IN AN ORGANIZED HEALTH CARE EDUCATION/TRAINING PROGRAM

## 2023-12-07 PROCEDURE — 1036F TOBACCO NON-USER: CPT | Performed by: OBSTETRICS & GYNECOLOGY

## 2023-12-07 PROCEDURE — 3075F SYST BP GE 130 - 139MM HG: CPT | Performed by: OBSTETRICS & GYNECOLOGY

## 2023-12-07 PROCEDURE — 77063 BREAST TOMOSYNTHESIS BI: CPT | Performed by: STUDENT IN AN ORGANIZED HEALTH CARE EDUCATION/TRAINING PROGRAM

## 2023-12-07 PROCEDURE — 3078F DIAST BP <80 MM HG: CPT | Performed by: OBSTETRICS & GYNECOLOGY

## 2023-12-07 PROCEDURE — 88175 CYTOPATH C/V AUTO FLUID REDO: CPT

## 2023-12-07 PROCEDURE — 77067 SCR MAMMO BI INCL CAD: CPT

## 2023-12-07 PROCEDURE — 99396 PREV VISIT EST AGE 40-64: CPT | Performed by: OBSTETRICS & GYNECOLOGY

## 2023-12-07 NOTE — PROGRESS NOTES
"Bree Bojorquez is a 58 y.o. female who is here for a routine exam.     Patient denies any vaginal bleeding    Menopause symptoms: Denies    Sexually active: Postmenopausal  Active no concerns    Regular self breast exam: yes  No concerns on her exams    Menstrual History:  OB History          2    Para   2    Term                AB        Living   2         SAB        IAB        Ectopic        Multiple        Live Births                    No LMP recorded. Patient has had a hysterectomy.         Review of Systems  All Normal Review of Systems  Constitutional: no fever, no chills, no recent weight gain, no recent weight loss and no fatigue.    Gastrointestinal: no abdominal pain, no constipation, no nausea, no diarrhea and no vomiting.    Genitourinary: no dysuria, no urinary incontinence, no vaginal dryness, no vaginal itching, no dyspareunia, no pelvic pain, no dysmenorrhea, no sexual problems, no change in urinary frequency, no vaginal discharge, no unexplained vaginal bleeding and no lesion/sore.    Breasts: No masses.  No nipple discharge.  No redness    Objective   /70   Ht 1.727 m (5' 8\")   Wt 80.7 kg (178 lb)   BMI 27.06 kg/m²     OBGyn Exam   Physical Exam  Constitutional: Alert and in no acute distress. Well developed, well nourished.   Head and Face: Head and face: Normal.    Eyes: Normal external exam - nonicteric sclera, extraocular movements intact (EOMI) and no ptosis.   Ears, Nose, Mouth, and Throat: External inspection of ears and nose: Normal.    Neck: No neck asymmetry. Supple. Thyroid not enlarged and there were no palpable thyroid nodules.   Chest: Breasts: Normal appearance, no nipple discharge and no skin changes. Palpation of breasts and axillae: No palpable mass and no axillary lymphadenopathy.   Abdomen: Soft nontender; no abdominal mass palpated. No organomegaly. No hernias.     Genitourinary:   External genitalia: Normal. No inguinal lymphadenopathy. Bartholin's " Urethral and Skenes Glands: Normal. Urethra: Normal.    Bladder: Normal on palpation.   Vagina: Normal. No discharge  Cervix: Normal.    Uterus: Absent.    Right Adnexa/parametria: Normal.  Left Adnexa/parametria: Normal.    Inspection of Perianal Area: Normal.     Musculoskeletal: No joint swelling seen, normal movements of all extremities.   Skin: Normal skin color and pigmentation, normal skin turgor, and no rash.   Neurologic: Non-focal. Grossly intact.   Psychiatric: Alert and oriented x 3. Affect normal to patient baseline. Mood: Appropriate.      Assessment/Plan   1) Annual exam:  Pap with HPV testing completed today  Mammogram order placed    Thank you again for your visit to our office today  Please follow-up as needed or in 1 year with your annual exam

## 2023-12-12 ENCOUNTER — PRE-ADMISSION TESTING (OUTPATIENT)
Dept: PREADMISSION TESTING | Facility: HOSPITAL | Age: 58
End: 2023-12-12
Payer: COMMERCIAL

## 2023-12-12 ENCOUNTER — HOSPITAL ENCOUNTER (OUTPATIENT)
Dept: RADIOLOGY | Facility: HOSPITAL | Age: 58
Discharge: HOME | End: 2023-12-12
Payer: COMMERCIAL

## 2023-12-12 VITALS
TEMPERATURE: 97.1 F | RESPIRATION RATE: 16 BRPM | WEIGHT: 179.45 LBS | HEART RATE: 82 BPM | BODY MASS INDEX: 27.2 KG/M2 | SYSTOLIC BLOOD PRESSURE: 135 MMHG | HEIGHT: 68 IN | DIASTOLIC BLOOD PRESSURE: 85 MMHG | OXYGEN SATURATION: 100 %

## 2023-12-12 DIAGNOSIS — M25.362 INSTABILITY OF LEFT KNEE JOINT: ICD-10-CM

## 2023-12-12 DIAGNOSIS — Z01.818 PREOPERATIVE TESTING: Primary | ICD-10-CM

## 2023-12-12 LAB
ALBUMIN SERPL BCP-MCNC: 4.5 G/DL (ref 3.4–5)
ALP SERPL-CCNC: 110 U/L (ref 33–110)
ALT SERPL W P-5'-P-CCNC: 13 U/L (ref 7–45)
ANION GAP SERPL CALC-SCNC: 13 MMOL/L (ref 10–20)
APPEARANCE UR: CLEAR
AST SERPL W P-5'-P-CCNC: 16 U/L (ref 9–39)
BILIRUB SERPL-MCNC: 0.5 MG/DL (ref 0–1.2)
BILIRUB UR STRIP.AUTO-MCNC: NEGATIVE MG/DL
BUN SERPL-MCNC: 25 MG/DL (ref 6–23)
CALCIUM SERPL-MCNC: 9.6 MG/DL (ref 8.6–10.3)
CHLORIDE SERPL-SCNC: 102 MMOL/L (ref 98–107)
CO2 SERPL-SCNC: 26 MMOL/L (ref 21–32)
COLOR UR: YELLOW
CREAT SERPL-MCNC: 0.78 MG/DL (ref 0.5–1.05)
ERYTHROCYTE [DISTWIDTH] IN BLOOD BY AUTOMATED COUNT: 12 % (ref 11.5–14.5)
GFR SERPL CREATININE-BSD FRML MDRD: 88 ML/MIN/1.73M*2
GLUCOSE SERPL-MCNC: 78 MG/DL (ref 74–99)
GLUCOSE UR STRIP.AUTO-MCNC: NEGATIVE MG/DL
HCT VFR BLD AUTO: 39.7 % (ref 36–46)
HGB BLD-MCNC: 13 G/DL (ref 12–16)
HOLD SPECIMEN: NORMAL
KETONES UR STRIP.AUTO-MCNC: NEGATIVE MG/DL
LEUKOCYTE ESTERASE UR QL STRIP.AUTO: ABNORMAL
MCH RBC QN AUTO: 29 PG (ref 26–34)
MCHC RBC AUTO-ENTMCNC: 32.7 G/DL (ref 32–36)
MCV RBC AUTO: 89 FL (ref 80–100)
NITRITE UR QL STRIP.AUTO: NEGATIVE
NRBC BLD-RTO: NORMAL /100{WBCS}
PH UR STRIP.AUTO: 6 [PH]
PLATELET # BLD AUTO: 231 X10*3/UL (ref 150–450)
POTASSIUM SERPL-SCNC: 4.3 MMOL/L (ref 3.5–5.3)
PROT SERPL-MCNC: 6.7 G/DL (ref 6.4–8.2)
PROT UR STRIP.AUTO-MCNC: NEGATIVE MG/DL
RBC # BLD AUTO: 4.48 X10*6/UL (ref 4–5.2)
RBC # UR STRIP.AUTO: NEGATIVE /UL
RBC #/AREA URNS AUTO: NORMAL /HPF
SODIUM SERPL-SCNC: 137 MMOL/L (ref 136–145)
SP GR UR STRIP.AUTO: 1.01
SQUAMOUS #/AREA URNS AUTO: NORMAL /HPF
UROBILINOGEN UR STRIP.AUTO-MCNC: 0.2 MG/DL
WBC # BLD AUTO: 7.1 X10*3/UL (ref 4.4–11.3)
WBC #/AREA URNS AUTO: NORMAL /HPF

## 2023-12-12 PROCEDURE — 99204 OFFICE O/P NEW MOD 45 MIN: CPT

## 2023-12-12 PROCEDURE — 73700 CT LOWER EXTREMITY W/O DYE: CPT | Mod: LT

## 2023-12-12 PROCEDURE — 80053 COMPREHEN METABOLIC PANEL: CPT

## 2023-12-12 PROCEDURE — 87081 CULTURE SCREEN ONLY: CPT

## 2023-12-12 PROCEDURE — 85027 COMPLETE CBC AUTOMATED: CPT

## 2023-12-12 PROCEDURE — 36415 COLL VENOUS BLD VENIPUNCTURE: CPT

## 2023-12-12 PROCEDURE — 87086 URINE CULTURE/COLONY COUNT: CPT

## 2023-12-12 PROCEDURE — 81001 URINALYSIS AUTO W/SCOPE: CPT

## 2023-12-12 RX ORDER — ACETAMINOPHEN 500 MG
1000 TABLET ORAL EVERY 6 HOURS PRN
COMMUNITY
End: 2023-12-30 | Stop reason: HOSPADM

## 2023-12-12 RX ORDER — BISMUTH SUBSALICYLATE 262 MG
1 TABLET,CHEWABLE ORAL DAILY
COMMUNITY

## 2023-12-12 RX ORDER — CHLORHEXIDINE GLUCONATE ORAL RINSE 1.2 MG/ML
15 SOLUTION DENTAL DAILY
Qty: 30 ML | Refills: 0 | Status: SHIPPED | OUTPATIENT
Start: 2023-12-12 | End: 2023-12-14

## 2023-12-12 ASSESSMENT — CHADS2 SCORE
PRIOR STROKE OR TIA OR THROMBOEMBOLISM: NO
AGE GREATER THAN OR EQUAL TO 75: NO
HYPERTENSION: YES
DIABETES: NO
CHF: NO
CHADS2 SCORE: 1

## 2023-12-12 ASSESSMENT — DUKE ACTIVITY SCORE INDEX (DASI)
CAN YOU DO MODERATE WORK AROUND THE HOUSE LIKE VACUUMING, SWEEPING FLOORS OR CARRYING GROCERIES: YES
CAN YOU WALK A BLOCK OR TWO ON LEVEL GROUND: YES
CAN YOU PARTICIPATE IN MODERATE RECREATIONAL ACTIVITIES LIKE GOLF, BOWLING, DANCING, DOUBLES TENNIS OR THROWING A BASEBALL OR FOOTBALL: YES
CAN YOU DO YARD WORK LIKE RAKING LEAVES, WEEDING OR PUSHING A MOWER: YES
DASI METS SCORE: 8
CAN YOU WALK INDOORS, SUCH AS AROUND YOUR HOUSE: YES
CAN YOU TAKE CARE OF YOURSELF (EAT, DRESS, BATHE, OR USE TOILET): YES
CAN YOU DO LIGHT WORK AROUND THE HOUSE LIKE DUSTING OR WASHING DISHES: YES
CAN YOU CLIMB A FLIGHT OF STAIRS OR WALK UP A HILL: YES
CAN YOU HAVE SEXUAL RELATIONS: YES
CAN YOU PARTICIPATE IN STRENOUS SPORTS LIKE SWIMMING, SINGLES TENNIS, FOOTBALL, BASKETBALL, OR SKIING: NO
CAN YOU DO HEAVY WORK AROUND THE HOUSE LIKE SCRUBBING FLOORS OR LIFTING AND MOVING HEAVY FURNITURE: NO
TOTAL_SCORE: 42.7
CAN YOU RUN A SHORT DISTANCE: YES

## 2023-12-12 ASSESSMENT — PAIN - FUNCTIONAL ASSESSMENT: PAIN_FUNCTIONAL_ASSESSMENT: 0-10

## 2023-12-12 ASSESSMENT — PAIN SCALES - GENERAL: PAINLEVEL_OUTOF10: 0 - NO PAIN

## 2023-12-12 NOTE — CPM/PAT H&P
CPM/PAT Evaluation       Name: Bree Bojorquez (Bree Bojorquez)  /Age: 1965/58 y.o.     In-Person       Chief Complaint: Unilateral primary OA of the left knee    HPI  Patient is a 57 y/o alert and oriented female coming in for PAT for a robot assisted left total knee arthroplasty scheduled on 2023 with Dr Fuentes. She reports left knee pain that she rates at a 2/10 and worsens with movement and ambulation. Patient denies Cx pain, SOB, GAY, and NVDC. Patient also denies Hx DVT/PE. Current medications were reviewed and a presurgical medication schedule was provided. He has no questions at this time.    NO PERSONAL REPORTS OF REACTIONS TO ANESTHESIA  NO PERSONAL REPORTS OF FAMILY HISTORY OF REACTIONS TO ANESTHESIA  NO PERSONAL REPORTS OF METAL, NICKEL, OR SHELLFISH ALLERGY  NONSMOKER-NEVER SMOKED  NO ETOH/DRUGS    Past Medical History:   Diagnosis Date    Adjustment disorder with depressed mood     Complicated bereavement    Anxiety     Asthma     Basal cell carcinoma     skin cancer to back and chest    Encounter for screening for malignant neoplasm of vagina     Vaginal Pap smear    GERD (gastroesophageal reflux disease)     Hyperlipidemia     Hypertension     Major depressive disorder, recurrent, moderate (CMS/HCC) 2016    Major depressive disorder, recurrent episode, moderate with anxious distress    Metabolic syndrome     Insulin resistance    Other specified disorders of nose and nasal sinuses 2020    Sinus pressure    Personal history of other diseases of the respiratory system     History of allergic rhinitis    Personal history of other medical treatment     H/O mammogram    Personal history of other specified conditions 10/15/2019    History of abnormal mammogram    Radiculopathy, cervical region 05/10/2021    Cervical radicular pain    Radiculopathy, cervicothoracic region 05/10/2021    Radiculopathy of cervicothoracic region    Unspecified abnormal findings in urine  10/15/2019    Abnormal finding on urinalysis     Past Surgical History:   Procedure Laterality Date    BREAST SURGERY      right breast aspiration of cyst    CATARACT EXTRACTION Bilateral     KNEE ARTHROPLASTY Right     9/15/23    KNEE SURGERY  01/10/2014    left knee scope    PARTIAL HYSTERECTOMY      LSH    SALPINGOOPHORECTOMY Left      Family History   Problem Relation Name Age of Onset    Depression Mother      Hyperlipidemia Mother      Hypertension Mother      Hyperthyroidism Mother      Stroke Mother      Diabetes type II Mother      Coronary artery disease Father      Other (coronary artery surgery) Father      Breast cancer Father's Sister       Allergies   Allergen Reactions    Codeine Rash and Nausea/vomiting    Penicillin Rash    Sulfa (Sulfonamide Antibiotics) Rash     Medication Documentation Review Audit       Reviewed by Leanne Alanis RN (Registered Nurse) on 12/12/23 at 1235      Medication Order Taking? Sig Documenting Provider Last Dose Status   acetaminophen (Tylenol) 500 mg tablet [id number] Yes Take 2 tablets (1,000 mg) by mouth every 6 hours if needed for mild pain (1 - 3). Historical Provider, MD 12/11/2023 Active   albuterol 2.5 mg /3 mL (0.083 %) nebulizer solution [id number] No Take 3 mL (2.5 mg) by nebulization. Every 4-6 hours as needed for wheezing Historical Provider, MD More than a month Active   albuterol 90 mcg/actuation inhaler [id number] No Inhale 1-2 puffs. Every 4 -6 hours as needed Historical Provider, MD More than a month Active   atorvastatin (Lipitor) 20 mg tablet [id number] Yes Take 1 tablet (20 mg) by mouth once daily. Jet Giang DO 12/12/2023 Active   bimatoprost (Latisse) 0.03 % ophthalmic solution [id number] Yes use as directed Jet Giang DO Past Month Active     Discontinued 12/07/23 1307     Discontinued 12/07/23 1307   escitalopram (Lexapro) 10 mg tablet [id number] Yes Take 1 tablet (10 mg) by mouth once daily. Jet Giang DO 12/12/2023 Active   lisinopril 10  mg tablet [id number] Yes Take 1 tablet (10 mg) by mouth once daily. as directed Jet Giang, DO 12/12/2023 Active     Discontinued 12/07/23 1307   meloxicam (Mobic) 15 mg tablet [id number] Yes Take 1 tablet (15 mg) by mouth once daily as needed for mild pain (1 - 3) or moderate pain (4 - 6).   Patient taking differently: Take 1 tablet (15 mg) by mouth once daily as needed for mild pain (1 - 3) or moderate pain (4 - 6). Take with food    Jet Giang, DO 12/12/2023 Active   metFORMIN XR (Glucophage-XR) 500 mg 24 hr tablet [id number] Yes Take 2 tablets (1,000 mg) by mouth once daily in the evening. Take with meals. Jet Giang, DO 12/11/2023 Active   multivitamin tablet [id number] Yes Take 1 tablet by mouth once daily. Historical Provider, MD Past Week Active     Discontinued 12/07/23 1306     Discontinued 12/07/23 1306     Discontinued 12/07/23 1306                  Review of Systems   Constitutional: Negative for chills, decreased appetite, diaphoresis, fever and malaise/fatigue.   Eyes:  Negative for blurred vision and double vision.   Cardiovascular:  Negative for chest pain, claudication, cyanosis, dyspnea on exertion, irregular heartbeat, leg swelling, near-syncope and palpitations.   Respiratory:  Negative for cough, hemoptysis, shortness of breath and wheezing.    Endocrine: Negative for cold intolerance, heat intolerance, polydipsia, polyphagia and polyuria.   Gastrointestinal:  Negative for abdominal pain, constipation, diarrhea, dysphagia, nausea and vomiting.   Genitourinary:  Negative for bladder incontinence, dysuria, hematuria, incomplete emptying, nocturia, pelvic pain and urgency.   Neurological:  Negative for headaches, light-headedness, paresthesias, sensory change and weakness.   Psychiatric/Behavioral:  Negative for altered mental status.       Vitals and nursing note reviewed. Physical exam within normal limits.   Constitutional:       Appearance: Normal appearance.    HENT:      Head:  "Normocephalic and atraumatic.      Mouth/Throat:      Mouth: Mucous membranes are moist.      Pharynx: Oropharynx is clear.   Eyes:      Extraocular Movements: Extraocular movements intact.      Conjunctiva/sclera: Conjunctivae normal.      Pupils: Pupils are equal, round, and reactive to light.   Cardiovascular:      Rate and Rhythm: Normal rate and regular rhythm.      Pulses: Normal pulses.      Heart sounds: Normal heart sounds.      No audible carotid bruit  Pulmonary:      Effort: Pulmonary effort is normal.      Breath sounds: Normal breath sounds.   Abdominal:      General: Abdomen is flat. Bowel sounds are normal.      Palpations: Abdomen is soft.   Musculoskeletal:      Cervical back: Normal range of motion and neck supple.   Skin:     General: Skin is warm and dry.      Capillary Refill: Capillary refill takes less than 2 seconds.   Neurological:      General: No focal deficit present.      Mental Status: She is alert and oriented to person, place, and time. Mental status is at baseline.   Psychiatric:         Mood and Affect: Mood normal.         Behavior: Behavior normal.         Thought Content: Thought content normal.         Judgment: Judgment normal.     PAT AIRWAY:   Airway:     Mallampati::  II    TM distance::  >3 FB    Neck ROM::  Full     Visit Vitals  /85   Pulse 82   Temp 36.2 °C (97.1 °F) (Temporal)   Resp 16   Ht 1.727 m (5' 8\")   Wt 81.4 kg (179 lb 7.3 oz)   SpO2 100%   BMI 27.29 kg/m²   OB Status Hysterectomy   Smoking Status Never   BSA 1.98 m²      LAST EKG COMPLETED 8/29/2023-NSR RATE 71. NO ACUTE CHANGES-SEE LANDON IMRANDA-COPY IN CHART    DASI Risk Score      Flowsheet Row Most Recent Value   DASI SCORE 42.7   METS Score (Will be calculated only when all the questions are answered) 8          Caprini DVT Assessment      Flowsheet Row Most Recent Value   DVT Score 7   Current Status Elective major lower extremity arthroplasty   Age 40-59 years   BMI 30 or less      "     Modified Frailty Index      Flowsheet Row Most Recent Value   Modified Frailty Index Calculator .0909          CHADS2 Stroke Risk  Current as of 6 minutes ago        N/A 3 - 100%: High Risk   2 - 3%: Medium Risk   0 - 2%: Low Risk     Last Change: N/A          This score determines the patient's risk of having a stroke if the patient has atrial fibrillation.        This score is not applicable to this patient. Components are not calculated.          Revised Cardiac Risk Index      Flowsheet Row Most Recent Value   Revised Cardiac Risk Calculator 0          Apfel Simplified Score    No data to display       Risk Analysis Index Results This Encounter    No data found in the last 1 encounters.       Stop Bang Score      Flowsheet Row Most Recent Value   Do you snore loudly? 1   Do you often feel tired or fatigued after your sleep? 0   Has anyone ever observed you stop breathing in your sleep? 0   Do you have or are you being treated for high blood pressure? 1   Recent BMI (Calculated) 27.3   Is BMI greater than 35 kg/m2? 0=No   Age older than 50 years old? 1=Yes   Is your neck circumference greater than 17 inches (Male) or 16 inches (Female)? 0   Gender - Male 0=No   STOP-BANG Total Score 3          Assessment and Plan:     Unilateral primary OA of the left knee- robot assisted left total knee arthroplasty scheduled on 12/29/2023 with Dr Fuentes.     Hypertension-Managed with lisinopril 10 mg tablet. BP in /85    Preoperative risk assessment  ASA II  RCRI-0 POINTS CLASS I RISK 3.9%  STOP-BANGS-3 POINTS MODERATE RISK FOR CONRADO  NSQIP-PREDICTED LENGTH OF STAY 1 DAY  ARISCAT-3 POINTS LOW RISK 1.6%  DASI-42.7 POINTS. 7.99 METS  MIRACLE-0.1%  JHFRAT-3 POINTS LOW RISK FOR FALLS  CLEARANCE-NA  PAT TESTING-CBC, CMP, UA, MRSA PCR, EKG  CHLORHEXIDINE .12% DENTAL RINSE E-PRESCRIBED PER  INFECTION PREVENTION PROTOCOL. PATIENT EDUCATED.    *CLEARED FOR SURGERY PENDING LABS/EKG. LABS REVIEWED, STABLE    *FACE TO FACE TIME 20  MINUTES.

## 2023-12-12 NOTE — H&P (VIEW-ONLY)
CPM/PAT Evaluation       Name: Bree Bojorquez (Bree Bojorquez)  /Age: 1965/58 y.o.     In-Person       Chief Complaint: Unilateral primary OA of the left knee    HPI  Patient is a 57 y/o alert and oriented female coming in for PAT for a robot assisted left total knee arthroplasty scheduled on 2023 with Dr Fuentes. She reports left knee pain that she rates at a 2/10 and worsens with movement and ambulation. Patient denies Cx pain, SOB, GAY, and NVDC. Patient also denies Hx DVT/PE. Current medications were reviewed and a presurgical medication schedule was provided. He has no questions at this time.    NO PERSONAL REPORTS OF REACTIONS TO ANESTHESIA  NO PERSONAL REPORTS OF FAMILY HISTORY OF REACTIONS TO ANESTHESIA  NO PERSONAL REPORTS OF METAL, NICKEL, OR SHELLFISH ALLERGY  NONSMOKER-NEVER SMOKED  NO ETOH/DRUGS    Past Medical History:   Diagnosis Date    Adjustment disorder with depressed mood     Complicated bereavement    Anxiety     Asthma     Basal cell carcinoma     skin cancer to back and chest    Encounter for screening for malignant neoplasm of vagina     Vaginal Pap smear    GERD (gastroesophageal reflux disease)     Hyperlipidemia     Hypertension     Major depressive disorder, recurrent, moderate (CMS/HCC) 2016    Major depressive disorder, recurrent episode, moderate with anxious distress    Metabolic syndrome     Insulin resistance    Other specified disorders of nose and nasal sinuses 2020    Sinus pressure    Personal history of other diseases of the respiratory system     History of allergic rhinitis    Personal history of other medical treatment     H/O mammogram    Personal history of other specified conditions 10/15/2019    History of abnormal mammogram    Radiculopathy, cervical region 05/10/2021    Cervical radicular pain    Radiculopathy, cervicothoracic region 05/10/2021    Radiculopathy of cervicothoracic region    Unspecified abnormal findings in urine  10/15/2019    Abnormal finding on urinalysis     Past Surgical History:   Procedure Laterality Date    BREAST SURGERY      right breast aspiration of cyst    CATARACT EXTRACTION Bilateral     KNEE ARTHROPLASTY Right     9/15/23    KNEE SURGERY  01/10/2014    left knee scope    PARTIAL HYSTERECTOMY      LSH    SALPINGOOPHORECTOMY Left      Family History   Problem Relation Name Age of Onset    Depression Mother      Hyperlipidemia Mother      Hypertension Mother      Hyperthyroidism Mother      Stroke Mother      Diabetes type II Mother      Coronary artery disease Father      Other (coronary artery surgery) Father      Breast cancer Father's Sister       Allergies   Allergen Reactions    Codeine Rash and Nausea/vomiting    Penicillin Rash    Sulfa (Sulfonamide Antibiotics) Rash     Medication Documentation Review Audit       Reviewed by Leanne Alanis RN (Registered Nurse) on 12/12/23 at 1235      Medication Order Taking? Sig Documenting Provider Last Dose Status   acetaminophen (Tylenol) 500 mg tablet [id number] Yes Take 2 tablets (1,000 mg) by mouth every 6 hours if needed for mild pain (1 - 3). Historical Provider, MD 12/11/2023 Active   albuterol 2.5 mg /3 mL (0.083 %) nebulizer solution [id number] No Take 3 mL (2.5 mg) by nebulization. Every 4-6 hours as needed for wheezing Historical Provider, MD More than a month Active   albuterol 90 mcg/actuation inhaler [id number] No Inhale 1-2 puffs. Every 4 -6 hours as needed Historical Provider, MD More than a month Active   atorvastatin (Lipitor) 20 mg tablet [id number] Yes Take 1 tablet (20 mg) by mouth once daily. Jet Giang DO 12/12/2023 Active   bimatoprost (Latisse) 0.03 % ophthalmic solution [id number] Yes use as directed Jet Giang DO Past Month Active     Discontinued 12/07/23 1307     Discontinued 12/07/23 1307   escitalopram (Lexapro) 10 mg tablet [id number] Yes Take 1 tablet (10 mg) by mouth once daily. Jet Giang DO 12/12/2023 Active   lisinopril 10  mg tablet [id number] Yes Take 1 tablet (10 mg) by mouth once daily. as directed Jet Giang, DO 12/12/2023 Active     Discontinued 12/07/23 1307   meloxicam (Mobic) 15 mg tablet [id number] Yes Take 1 tablet (15 mg) by mouth once daily as needed for mild pain (1 - 3) or moderate pain (4 - 6).   Patient taking differently: Take 1 tablet (15 mg) by mouth once daily as needed for mild pain (1 - 3) or moderate pain (4 - 6). Take with food    Jet Giang, DO 12/12/2023 Active   metFORMIN XR (Glucophage-XR) 500 mg 24 hr tablet [id number] Yes Take 2 tablets (1,000 mg) by mouth once daily in the evening. Take with meals. Jet Giang, DO 12/11/2023 Active   multivitamin tablet [id number] Yes Take 1 tablet by mouth once daily. Historical Provider, MD Past Week Active     Discontinued 12/07/23 1306     Discontinued 12/07/23 1306     Discontinued 12/07/23 1306                  Review of Systems   Constitutional: Negative for chills, decreased appetite, diaphoresis, fever and malaise/fatigue.   Eyes:  Negative for blurred vision and double vision.   Cardiovascular:  Negative for chest pain, claudication, cyanosis, dyspnea on exertion, irregular heartbeat, leg swelling, near-syncope and palpitations.   Respiratory:  Negative for cough, hemoptysis, shortness of breath and wheezing.    Endocrine: Negative for cold intolerance, heat intolerance, polydipsia, polyphagia and polyuria.   Gastrointestinal:  Negative for abdominal pain, constipation, diarrhea, dysphagia, nausea and vomiting.   Genitourinary:  Negative for bladder incontinence, dysuria, hematuria, incomplete emptying, nocturia, pelvic pain and urgency.   Neurological:  Negative for headaches, light-headedness, paresthesias, sensory change and weakness.   Psychiatric/Behavioral:  Negative for altered mental status.       Vitals and nursing note reviewed. Physical exam within normal limits.   Constitutional:       Appearance: Normal appearance.    HENT:      Head:  "Normocephalic and atraumatic.      Mouth/Throat:      Mouth: Mucous membranes are moist.      Pharynx: Oropharynx is clear.   Eyes:      Extraocular Movements: Extraocular movements intact.      Conjunctiva/sclera: Conjunctivae normal.      Pupils: Pupils are equal, round, and reactive to light.   Cardiovascular:      Rate and Rhythm: Normal rate and regular rhythm.      Pulses: Normal pulses.      Heart sounds: Normal heart sounds.      No audible carotid bruit  Pulmonary:      Effort: Pulmonary effort is normal.      Breath sounds: Normal breath sounds.   Abdominal:      General: Abdomen is flat. Bowel sounds are normal.      Palpations: Abdomen is soft.   Musculoskeletal:      Cervical back: Normal range of motion and neck supple.   Skin:     General: Skin is warm and dry.      Capillary Refill: Capillary refill takes less than 2 seconds.   Neurological:      General: No focal deficit present.      Mental Status: She is alert and oriented to person, place, and time. Mental status is at baseline.   Psychiatric:         Mood and Affect: Mood normal.         Behavior: Behavior normal.         Thought Content: Thought content normal.         Judgment: Judgment normal.     PAT AIRWAY:   Airway:     Mallampati::  II    TM distance::  >3 FB    Neck ROM::  Full     Visit Vitals  /85   Pulse 82   Temp 36.2 °C (97.1 °F) (Temporal)   Resp 16   Ht 1.727 m (5' 8\")   Wt 81.4 kg (179 lb 7.3 oz)   SpO2 100%   BMI 27.29 kg/m²   OB Status Hysterectomy   Smoking Status Never   BSA 1.98 m²      LAST EKG COMPLETED 8/29/2023-NSR RATE 71. NO ACUTE CHANGES-SEE LANDON MIRANDA-COPY IN CHART    DASI Risk Score      Flowsheet Row Most Recent Value   DASI SCORE 42.7   METS Score (Will be calculated only when all the questions are answered) 8          Caprini DVT Assessment      Flowsheet Row Most Recent Value   DVT Score 7   Current Status Elective major lower extremity arthroplasty   Age 40-59 years   BMI 30 or less      "     Modified Frailty Index      Flowsheet Row Most Recent Value   Modified Frailty Index Calculator .0909          CHADS2 Stroke Risk  Current as of 6 minutes ago        N/A 3 - 100%: High Risk   2 - 3%: Medium Risk   0 - 2%: Low Risk     Last Change: N/A          This score determines the patient's risk of having a stroke if the patient has atrial fibrillation.        This score is not applicable to this patient. Components are not calculated.          Revised Cardiac Risk Index      Flowsheet Row Most Recent Value   Revised Cardiac Risk Calculator 0          Apfel Simplified Score    No data to display       Risk Analysis Index Results This Encounter    No data found in the last 1 encounters.       Stop Bang Score      Flowsheet Row Most Recent Value   Do you snore loudly? 1   Do you often feel tired or fatigued after your sleep? 0   Has anyone ever observed you stop breathing in your sleep? 0   Do you have or are you being treated for high blood pressure? 1   Recent BMI (Calculated) 27.3   Is BMI greater than 35 kg/m2? 0=No   Age older than 50 years old? 1=Yes   Is your neck circumference greater than 17 inches (Male) or 16 inches (Female)? 0   Gender - Male 0=No   STOP-BANG Total Score 3          Assessment and Plan:     Unilateral primary OA of the left knee- robot assisted left total knee arthroplasty scheduled on 12/29/2023 with Dr Fuentes.     Hypertension-Managed with lisinopril 10 mg tablet. BP in /85    Preoperative risk assessment  ASA II  RCRI-0 POINTS CLASS I RISK 3.9%  STOP-BANGS-3 POINTS MODERATE RISK FOR CONRADO  NSQIP-PREDICTED LENGTH OF STAY 1 DAY  ARISCAT-3 POINTS LOW RISK 1.6%  DASI-42.7 POINTS. 7.99 METS  MIRACLE-0.1%  JHFRAT-3 POINTS LOW RISK FOR FALLS  CLEARANCE-NA  PAT TESTING-CBC, CMP, UA, MRSA PCR, EKG  CHLORHEXIDINE .12% DENTAL RINSE E-PRESCRIBED PER  INFECTION PREVENTION PROTOCOL. PATIENT EDUCATED.    *CLEARED FOR SURGERY PENDING LABS/EKG. LABS REVIEWED, STABLE    *FACE TO FACE TIME 20  MINUTES.

## 2023-12-12 NOTE — PREPROCEDURE INSTRUCTIONS
Medication List            Accurate as of December 12, 2023 12:44 PM. Always use your most recent med list.                acetaminophen 500 mg tablet  Commonly known as: Tylenol  Medication Adjustments for Surgery: Take morning of surgery with sip of water, no other fluids     * albuterol 2.5 mg /3 mL (0.083 %) nebulizer solution  Medication Adjustments for Surgery: Take morning of surgery with sip of water, no other fluids     * albuterol 90 mcg/actuation inhaler  Medication Adjustments for Surgery: Take morning of surgery with sip of water, no other fluids     atorvastatin 20 mg tablet  Commonly known as: Lipitor  Take 1 tablet (20 mg) by mouth once daily.  Medication Adjustments for Surgery: Take morning of surgery with sip of water, no other fluids     bimatoprost 0.03 % ophthalmic solution  Commonly known as: Latisse  use as directed  Medication Adjustments for Surgery: Continue until night before surgery     chlorhexidine 0.12 % solution  Commonly known as: Peridex  Use 15 mL in the mouth or throat once daily for 2 doses. 15 ML night before surgery and 15 ML morning of surgery. Swish and spit  Notes to patient: Yo use night before and morning of surgery     escitalopram 10 mg tablet  Commonly known as: Lexapro  Take 1 tablet (10 mg) by mouth once daily.  Medication Adjustments for Surgery: Take morning of surgery with sip of water, no other fluids     lisinopril 10 mg tablet  Take 1 tablet (10 mg) by mouth once daily. as directed  Medication Adjustments for Surgery: Stop 1 day before surgery     meloxicam 15 mg tablet  Commonly known as: Mobic  Take 1 tablet (15 mg) by mouth once daily as needed for mild pain (1 - 3) or moderate pain (4 - 6).  Notes to patient: Stop 5 days prior to surgery     metFORMIN  mg 24 hr tablet  Commonly known as: Glucophage-XR  Take 2 tablets (1,000 mg) by mouth once daily in the evening. Take with meals.  Medication Adjustments for Surgery: Stop 1 day before surgery      multivitamin tablet  Medication Adjustments for Surgery: Stop 7 days before surgery           * This list has 2 medication(s) that are the same as other medications prescribed for you. Read the directions carefully, and ask your doctor or other care provider to review them with you.                                  NPO Instructions:    Do not eat any food after midnight the night before your surgery/procedure.    Additional Instructions:     Seven/Six Days before Surgery:  Review your medication instructions, stop indicated medications  Five Days before Surgery:  Review your medication instructions, stop indicated medications  Three Days before Surgery:  Review your medication instructions, stop indicated medications  The Day before Surgery:  Review your medication instructions, stop indicated medications  You will be contacted regarding the time of your arrival to facility and surgery time  Do not eat any food after Midnight  Day of Surgery:  Review your medication instructions, take indicated medications  Wear  comfortable loose fitting clothing  Do not use moisturizers, creams, lotions or perfume  All jewelry and valuables should be left at home      Follow written instructions given and explained for CHG wash and mouthwash.

## 2023-12-13 ENCOUNTER — APPOINTMENT (OUTPATIENT)
Dept: PREADMISSION TESTING | Facility: HOSPITAL | Age: 58
End: 2023-12-13
Payer: COMMERCIAL

## 2023-12-13 ENCOUNTER — TREATMENT (OUTPATIENT)
Dept: PHYSICAL THERAPY | Facility: CLINIC | Age: 58
End: 2023-12-13
Payer: COMMERCIAL

## 2023-12-13 DIAGNOSIS — Z96.651 S/P TOTAL KNEE ARTHROPLASTY, RIGHT: Primary | ICD-10-CM

## 2023-12-13 DIAGNOSIS — Z74.09 IMPAIRED FUNCTIONAL MOBILITY AND ACTIVITY TOLERANCE: ICD-10-CM

## 2023-12-13 PROCEDURE — 97110 THERAPEUTIC EXERCISES: CPT | Mod: GP

## 2023-12-13 NOTE — PROGRESS NOTES
Physical Therapy Treatment    Patient Name: Bree Bojorquez  MRN: [id number]  Today's Date: 12/13/2023  Diagnosis: Right TKA     Visit # 14    PRECAUTIONS:  Left knee OA     SUBJECTIVE:  Pt has shooting pain in the knee on the inside, feels like a nerve.     OBJECTIVE:  TKE with mild lag yet improved movement initiation     TREATMENT:  - Therex:   Bike upright 8 min level 2 for ROM    2 x 10 kamla:     8 inch step ups  Lateral 6 inch step ups  Cone step over lateral, AP  4 inch step tap downs kamla     Stair stretches - hip flex, hamstring, gastroc 3 x 20 sec kamla     Supine 3 x 10 kamla:   SLR   sAQ  2.5#   Hip abduction  LAQ   Sit to stand elevated mat     TG 2 x 12   Air ex step overs kamla   Step taps lateral over air ex     - Manual Therapy:   STM medial knee, PROM    - Modalities:    CP x 10 min supine     ASSESSMENT:  Limited by medial knee pain in WB. Pt pain reduced with STM and CP. Pt educated in returning to CP use related to activity and pain. Pt with rylee of eccentric loading with UE assist and improved quality of movement.     PLAN:    Goal and progress assessment NV.

## 2023-12-14 LAB
BACTERIA UR CULT: ABNORMAL
STAPHYLOCOCCUS SPEC CULT: NORMAL

## 2023-12-18 ENCOUNTER — TREATMENT (OUTPATIENT)
Dept: PHYSICAL THERAPY | Facility: CLINIC | Age: 58
End: 2023-12-18
Payer: COMMERCIAL

## 2023-12-18 ENCOUNTER — TELEPHONE (OUTPATIENT)
Dept: ORTHOPEDIC SURGERY | Facility: CLINIC | Age: 58
End: 2023-12-18

## 2023-12-18 DIAGNOSIS — Z96.651 S/P TOTAL KNEE ARTHROPLASTY, RIGHT: Primary | ICD-10-CM

## 2023-12-18 DIAGNOSIS — Z74.09 IMPAIRED FUNCTIONAL MOBILITY AND ACTIVITY TOLERANCE: ICD-10-CM

## 2023-12-18 PROCEDURE — 97110 THERAPEUTIC EXERCISES: CPT | Mod: GP

## 2023-12-18 NOTE — TELEPHONE ENCOUNTER
Eri called in stating she is scheduled for surgery on 12/29/23.  When she was at pretesting they told her that her urine came back slightly abnormal.  She wants to know if she will need to take an antibiotic like she did last time.  If so she would like to go get it before the weather turns bad.

## 2023-12-18 NOTE — PROGRESS NOTES
Physical Therapy Treatment    Patient Name: Bree Bojorquez  MRN: [id number]  Today's Date: 12/18/2023  Diagnosis: Right TKA     Visit # 14    PRECAUTIONS:  Left knee OA     SUBJECTIVE:  Feels OK today. Feels like her RLE is stronger than she thinks it is.     OBJECTIVE:  1-117 supine AROM   Hamstring 4+/5 quad 4/5 DF PF 4+/5  Hip flex 4/5  QS completed well   QS with SLR - quad lag is present 9 degrees     LEFS 59/80    TREATMENT:  - Therex:   Bike upright 8 min level 2 for ROM    2 x 10 kamla:     8 inch step ups  Lateral 6 inch step ups  Cone step over lateral, AP  4 inch step tap downs kamla     Stair stretches - hip flex, hamstring, gastroc 3 x 20 sec kamla     Supine 3 x 10 kamla:   SLR   sAQ  2.5#   LAQ   Sit to stand elevated mat     TG 2 x 12   Air ex step overs kamla   Step taps lateral over air ex       - Modalities:    CP x 10 min supine     ASSESSMENT:  Pt has been able to achieve goals. Pt is functionally indp. Pt has been able to progress to good INDP with HEP. Pt cued for continued quad strength exercise to reduce lag and improve eccentric tolerance. UE assist with step down. Balance remains challenging. Pt to have L knee TKA done in 10 days. Pt to return to PT for new TKA and discharge the RTKA. Pt has a good HEP.     PLAN:   Discharge to HEP.

## 2023-12-20 ENCOUNTER — TELEPHONE (OUTPATIENT)
Dept: ORTHOPEDIC SURGERY | Facility: HOSPITAL | Age: 58
End: 2023-12-20
Payer: COMMERCIAL

## 2023-12-20 ENCOUNTER — HOSPITAL ENCOUNTER (OUTPATIENT)
Dept: RADIOLOGY | Facility: HOSPITAL | Age: 58
Discharge: HOME | End: 2023-12-20
Payer: COMMERCIAL

## 2023-12-20 DIAGNOSIS — M25.362 INSTABILITY OF LEFT KNEE JOINT: ICD-10-CM

## 2023-12-20 PROCEDURE — 73700 CT LOWER EXTREMITY W/O DYE: CPT | Mod: LT,RSC

## 2023-12-20 NOTE — TELEPHONE ENCOUNTER
Thank you for taking my call today.  All questions were answered at the time of the call, but please feel free to reach out to me via MyChart or phone, [phone], with any new questions or concerns.       We confirmed that you opted to enroll in our [id number] program so your discharge prescriptions will be available to take home at the time of discharge.       We confirmed that your plan would be to Stay Overnight.    We confirmed that you have DME needed for recovery.     Use the provided body wash for 4 days before surgery and complete a 5th shower on the morning of surgery, this includes your body and hair.  Follow the directions as provided during preadmission testing.  The mouth wash will be used the night before and the morning of surgery.       As a reminder, if you do not hear from our team, please call [phone] between 2pm and 3pm the business day before your surgery to confirm your arrival time and details.        Please don't hesitate to reach out with additional questions or concerns.    Kait Ortez MBA, BSN, RN-BC  Orthopedic   [facility]  [phone]

## 2023-12-27 ENCOUNTER — ANESTHESIA EVENT (OUTPATIENT)
Dept: OPERATING ROOM | Facility: HOSPITAL | Age: 58
End: 2023-12-27
Payer: COMMERCIAL

## 2023-12-27 LAB
CYTOLOGY CMNT CVX/VAG CYTO-IMP: NORMAL
HPV HR 12 DNA GENITAL QL NAA+PROBE: NEGATIVE
HPV HR GENOTYPES PNL CVX NAA+PROBE: NEGATIVE
HPV16 DNA SPEC QL NAA+PROBE: NEGATIVE
HPV18 DNA SPEC QL NAA+PROBE: NEGATIVE
LAB AP HPV GENOTYPE QUESTION: YES
LAB AP HPV HR: NORMAL
LABORATORY COMMENT REPORT: NORMAL
PATH REPORT.TOTAL CANCER: NORMAL

## 2023-12-28 PROBLEM — M17.12 PRIMARY OSTEOARTHRITIS OF LEFT KNEE: Status: ACTIVE | Noted: 2023-12-28

## 2023-12-28 RX ORDER — TRANEXAMIC ACID 100 MG/ML
1000 INJECTION, SOLUTION INTRAVENOUS 2 TIMES DAILY
Status: CANCELLED | OUTPATIENT
Start: 2023-12-28 | End: 2023-12-29

## 2023-12-28 RX ORDER — ONDANSETRON 4 MG/1
4 TABLET, ORALLY DISINTEGRATING ORAL EVERY 8 HOURS PRN
Qty: 20 TABLET | Refills: 1 | Status: SHIPPED | OUTPATIENT
Start: 2023-12-28

## 2023-12-28 RX ORDER — PANTOPRAZOLE SODIUM 40 MG/1
40 TABLET, DELAYED RELEASE ORAL DAILY PRN
Qty: 30 TABLET | Refills: 0 | Status: SHIPPED | OUTPATIENT
Start: 2023-12-28 | End: 2024-02-16 | Stop reason: SDUPTHER

## 2023-12-28 RX ORDER — NAPROXEN SODIUM 220 MG/1
81 TABLET, FILM COATED ORAL 2 TIMES DAILY
Qty: 60 TABLET | Refills: 0 | Status: SHIPPED | OUTPATIENT
Start: 2023-12-28 | End: 2024-01-28

## 2023-12-28 RX ORDER — CYCLOBENZAPRINE HCL 5 MG
5 TABLET ORAL 3 TIMES DAILY PRN
Qty: 30 TABLET | Refills: 0 | Status: SHIPPED | OUTPATIENT
Start: 2023-12-28 | End: 2024-01-04 | Stop reason: SDUPTHER

## 2023-12-28 RX ORDER — TRAMADOL HYDROCHLORIDE 50 MG/1
50 TABLET ORAL EVERY 6 HOURS PRN
Qty: 28 TABLET | Refills: 0 | Status: SHIPPED | OUTPATIENT
Start: 2023-12-28 | End: 2024-01-04 | Stop reason: SDUPTHER

## 2023-12-28 RX ORDER — ACETAMINOPHEN 325 MG/1
1000 TABLET ORAL EVERY 8 HOURS PRN
Qty: 90 TABLET | Refills: 1 | Status: SHIPPED | OUTPATIENT
Start: 2023-12-28

## 2023-12-28 RX ORDER — DOXYCYCLINE 100 MG/1
100 CAPSULE ORAL 2 TIMES DAILY
Qty: 14 CAPSULE | Refills: 0 | Status: SHIPPED | OUTPATIENT
Start: 2023-12-28 | End: 2024-01-05

## 2023-12-28 RX ORDER — CELECOXIB 200 MG/1
200 CAPSULE ORAL 2 TIMES DAILY
Qty: 60 CAPSULE | Refills: 0 | Status: SHIPPED | OUTPATIENT
Start: 2023-12-28 | End: 2024-01-27

## 2023-12-28 RX ORDER — SENNOSIDES 8.6 MG/1
1 TABLET ORAL DAILY
Qty: 30 TABLET | Refills: 0 | Status: SHIPPED | OUTPATIENT
Start: 2023-12-28 | End: 2024-01-28

## 2023-12-28 NOTE — DISCHARGE INSTRUCTIONS
Lisa Fuentes,   Phone: [phone] - [place], Medical Assistant    PLEASE READ CAREFULLY BEFORE CONTACTING YOUR PROVIDER.    WE WORK COLLABORATIVELY AS A TEAM. CALLING MULTIPLE STAFF MEMBERS REGARDING THE SAME ISSUE WILL DELAY YOUR CARE.  MYCHART IS THE PREFERRED COMMUNICATION FOR ALL TEAM MEMBERS.  ________________________________________________________________________________________________________________________________________________________________________    After Surgery Instructions: TOTAL KNEE ARTHROPLASTY    The following is a general guide and may be applied to most patients that go home from the hospital after surgery. If you go to a rehab facility the timeline may deviate a little. Please do not hesitate to call our office for any questions or additional guidance. We will work with you to get the best experience from your new joint replacement.     WEEK 1  Relax…Don't Overdo it!  - WEIGHT BEARING: As tolerated, unless otherwise specified  - Get up once an hour for small activities. (get a drink, go to the bathroom, etc.)  - Keep the surgical site iced and elevated above your heart, if possible, while you are resting.  - You will have some light exercises given to you from the physical therapist in the hospital. Work diligently on your range of motion.  - BANDAGE CARE: REMOVE YOUR BANDAGE AT HOME 7 DAYS AFTER SURGERY    DO NOT place a pillow under the knee for comfort  DO NOT sleep or rest in a recliner if you are able to get in your bed  DO NOT wait until you start therapy to bend the knee.  The sooner the better! (work within your pain tolerance).    All of this may sound scary but knees can get stiff easily and we want you to get your range of motion back as quickly as possible!    SWELLING AND BRUISING  BRUISING AND SWELLING AFTER SURGERY IS NORMAL. As the patient becomes more mobile the bruising and swelling will begin to migrate towards the ankle and foot and is usually noticed toward the  end of the day.    WEEK 2-3  You will have a prescription for physical therapy given to you or electronically prescribed and you should start outpatient therapy 7-10 days after your operation. Be sure to do the home exercises on the days you are not attending physical therapy.    - Continue to progress walking as tolerated.   - Continue to work on range of motion exercises at home with a goal of 0-120 degrees by 12 weeks post operative  - Continue to use ice for soreness on the surgical site  - You may wean from your walker to a cane when you feel safe and comfortable to do so. Your physical therapist will also be helpful with progressing your assistive device.   - Be careful with bending and twisting - If it hurts, stop!!    FOLLOW UP  - Your first post-operative visit with Dr. Lisa Fuentes should have been scheduled already. Please call [phone] for appointment questions  - You do not need new xrays for this visit  - Don't be nervous! This visit is to see how you are doing and make sure your incision is healing nicely and have begun working with physical therapy.       MEDICATION REFILLS - Please call main office number: [phone]    You will not receive a call indicating that your prescription has been filled.  Please contact your pharmacy with any questions.    Medication refills will be filled Monday-Friday 7am to 1pm ONLY. Please call the office or send a [employer] message for a refill request.  Any requests received outside of this timeframe will be handled on the next business day.  The office staff and orthopedic nurses cannot refill medications; messages should be left directly through the office or via my chart.  Please do not call multiple times or call other members of the care team for medication needs, this will cause the refill to take longer.    Per State and Institutional policy, pain medications can only be refilled every 7 days for up to six weeks following surgery.    DRIVING & TRAVEL  AFTER SURGERY   Patients should anticipate waiting at least 3-6 weeks before traveling long distances after surgery.  At minimum you cannot be using your walker before considering driving and you cannot take any narcotic medications prior to driving. You will need to stop to walk around ever 1 hour during your travel to help with blood clot prevention.  Please call the office or your joint nurse to discuss prior to post-surgical travel.  Patients may not drive until cleared by the joint nurse or the office.    DENTAL PROCEDURES & CLEANINGS  You must wait a minimum of 3 months for elective dental appointments (if possible, we understand emergencies happen), including routine cleanings or dental work including bridges, crowns, extractions, etc..  For any dental visit - cleaning or dental procedures - patients must take an antibiotic 1 hour before the appointment.  Antibiotics are a lifelong need before dental appointments.  The antibiotic prescribed will be based on each patient's allergies.    WOUND CARE  You will have an ace wrap on your leg put on during surgery. This compression wrap is for comfort and may be removed 24 hours after surgery. You may continue to use compression throughout your recovery if this is comfortable for you.  You have a waterproof bandage on your wound and may shower with this on. The waterproof bandage is to remain in place for 7 days. You may remove it yourself. You may leave your incision open to air after the bandage has been removed.  Under your waterproof bandage you have a mesh and glue dressin in place. Do not peel this off. This will be on for 3-4 weeks. You may trim the edges as they peel off on their own. You can continue to shower with this on. Let the water run freely over your incision when showering and do not scrub.   You may shower upon discharging from the hospital.  Soap and water is permitted to run over the surgical dressing.  Do not scrub directly over these items.  DO  NOT soak your incision in a bath, hot tub, pool or pond/lake for a minimum of 3 weeks following your surgery.  DO NOT use lotions, creams, ointments on your wound for a minimum of 3 weeks following your surgery. At that time you may use vitamin E to assist with softening of your incision.    NUMBNESS & CLICKING  Audible clicking with movement or exercises is considered normal following joint replacement.  You may also feel decreased sensation or numbness near the incision site.  These are usually normal and may or may not fade over time.     RESTARTING HOME ROUTINE - DIET & MEDICATIONS  Post-operative constipation can result due to a combination of inactivity, anesthesia and pain medication. To help prevent this, you should increase your water and fiber intake. Physical activity such as walking will also help stimulate the bowels.   You may resume your normal diet when you discharge home.  Choose foods that help promote good bowel habits and prevent constipation, such as foods high in fiber.  You may restart your home medications the following day after your surgery UNLESS you have been given alternate instructions.  Follow the instructions given to you on your hospital discharge instructions for more information regarding your home medications.    IN-HOME PHYSICAL THERAPY & OUTPATIENT PHYSICAL THERAPY  Continue the exercises you were given in the hospital until you have been seen by in-home therapy.  Make sure to provide a phone number with the ability for the home care staff to leave a message if you do not answer your phone.    Depending on your treatment plan you will begin outpatient or home therapy after surgery. This should be arranged through the office of hospital during discharge  FOR PATIENT DOING HOME THERAPY: Outpatient physical therapy following knee replacement surgery should begin 2-3 weeks after surgery.  You should call to schedule this appointment ASAP if not already scheduled before surgery.   Waiting until you are ready for outpatient physical therapy will cause a delay in your care.  You may choose any outpatient physical therapy location.  Call the office for an order if needed.    EMERGENCIES - WHEN TO CONTACT THE SURGEON'S OFFICE IMMEDIATELY  Fever >101 with chills that has been present for at least 48 hours.   Excessive bleeding from incision that will not slow down. A small amount of drainage is normal and expected.  Once pressure is applied and the area is covered, do not continue to check the area regularly.  This will remove pressure and bleeding will continue.  Leave in place for 4-6 hours.  Signs of infection of incision-excessive drainage that is soaking through your dressing (especially if it is pus-like), redness that is spreading out from the edges of your incision, or increased warmth around the area.  Excruciating pain for which the pain medication, taken as instructed, is not helping.  Severe calf pain.  Go directly to the emergency room or call 911, if you are experiencing chest pain or difficulty breathing.    ICE & COLD THERAPY INSTRUCTIONS    To assist with pain control and post-op swelling, you should be using ice regularly throughout recovery, especially for the first 6 weeks, regardless of the cold therapy method you use.      Always make sure there is a layer of protection between the cold pad and your skin.    If you are using ICE PACKS or GEL PACKS, you will need to alternate 20 minutes on, 20 minutes off twice per hour.    If you are using an ICE MACHINE, please follow the provided ice machine instructions.  These devices differ from ice or ice packs whereas the mechanism circulates water through tubing and a pad to provide longer periods of cold therapy to the desired site.  You can use your cold devices around the clock for optimal comfort.  We recommend using cold therapy after working with therapy or completing exercises on your own.  There is no set schedule in which you  must follow while using cold therapy.  Below are a few points to remember when using a cold therapy device:    You do not need to need to use the 20 on, 20 off method.  Detach the pad from the cooler and ambulate at least once every hour.  You can check your skin under the pad at this time.  You may wear the cold therapy device during periods of sleep including overnight.  If you wake up during the night, you can check the skin at this time.  You do not need to wake up specifically to perform skin checks.  Empty the cooler and pad when device is not in use.  Follow 's instructions for cleaning your cold therapy device.      DISCHARGE MEDICATIONS - Please reference the sample schedule on the reverse side for instructions on how to best schedule medications.    PAIN MEDICATION    _______ Oxycodone   Oxycodone has been prescribed for post-operative pain control. Take one 5 mg tablet every 6 hours for pain. Alternate with Tramadol. See medication example sheet. This medication will only be refilled ONCE every 7 days for a period of 6 weeks. After 6 weeks, you will transition to acetaminophen (Tylenol) and over -the- counter anti-inflammatories such as Ibuprofen, Advil or Aleve in conjunction with ICE.    Side effects may be constipation and nausea, vomiting, sleepiness, dizziness, lightheadedness, headache, blurred vision, dry mouth sweating, itching (if you have itching, over-the -counter Benadryl can be used as needed).   You may NOT operate a motor vehicle while taking this medication or have been cleared by your surgeon or PA.     ________ Tramadol   Tramadol has been prescribed for post-operative pain control. Take one 50 mg tablet every 6 hours for pain. Alternate with Oxycodone. See medication example sheet. This medication will only be refilled ONCE every 7 days for a period of 6 weeks. After 6 weeks, you will transition to acetaminophen (Tylenol) and over- the- counter anti-inflammatories such as  Ibuprofen, Advil or Aleve in conjunction with ICE.    Side effects may be constipation and nausea, vomiting, sleepiness, dizziness, lightheadedness, headache, blurred vision, dry mouth, sweating, itching (if you have itching, over-the -counter Benadryl can be used as needed).   You may NOT operate a motor vehicle while taking this medication or have been cleared by your surgeon or PA.    NON-NARCOTIC PAIN MEDICATION     _________ Celecoxib (Celebrex) or Meloxicam (Mobic) - Prescription anti-inflammatory medication   One of these will be prescribed (if you are able to take it) as an adjunct anti-inflammatory to assist in pain control. Take one twice daily for 4 weeks. See medication example sheet. You will not receive refills on this medication.   Side effects may include nausea or upset stomach    _________ Acetaminophen (Tylenol)   Acetaminophen has been prescribed as an adjunct for pain control. Take two 500 mg tablet every 6-8 hours for 4 weeks. See medication example sheet. No refills will be given after initial prescription.   Side effects may include nausea, heartburn, drowsiness, and headache.    _________Cyclobenzaprine (Flexeril)   This is a muscle relaxer that will be prescribed    Take this AS NEEDED per instructions on bottle   This will be only prescribed once and is available to help with muscle spasms. There will be no refills of this medication    BLOOD THINNER    __________ Aspirin or Other Blood Thinner   Aspirin or another medication has been prescribed as a blood thinner to prevent blood clots in your leg or lungs. Take as prescribed on the bottle for 4 weeks. You will not receive a refill on this medication.   Do not take this medication if you are on another blood thinner.    ANTI NAUSEA    __________ Pantoprazole   Pantoprazole has been prescribed to help with nausea and protect your stomach while taking pain medication. Take one 40 mg tablet once daily for 4 weeks. You will not receive a  refill on this medication.    ANTIBIOTIC     If you are deemed “high risk” for infection after surgery and antibiotic will be prescribed. Please take this as directed for one week.     STOOL SOFTENERS    __________ Senna   Post-operative constipation can result due to a combination of inactivity, anesthesia and pain medication. To help prevent this, you should increase your water and fiber intake. Physical activity such as walking will also help stimulate the bowels.    Senna has been prescribed to help with constipation while on Oxycodone and Tramadol. Take one tablet twice daily for 4 weeks. No refills will be given.   You may also take Miralax in combination with Senna to prevent constipation.  This can be purchased over the counter at your local pharmacy.  Take as instructed on the bottle.   Pain Medication Refills -[phone] or MyChart- Monday through Friday 7am-1pm    Medication refills will be sent upon receipt of your request during the times listed above. Due to the high call volume, you will not receive a call confirming prescription refills; please do not call multiple times.  Prescription refills may take a few hours to process, you may follow up with your pharmacy for pickup availability.    SAMPLE              The times below are an example of how to organize medications to optimize pain control  Your actual medication schedule may vary based on your last dose taken IN THE HOSPITAL      Time 3:00am 6:00am 9:00am 12:00pm 3:00pm 6:00pm 9:00pm 12:00am   Medications Tramadol Acetaminophen (Tylenol)   Oxycodone  Miralax   Blood Thinner  Colace  Pantoprazole  Tramadol  Meloxicam Acetaminophen (Tylenol)   Oxycodone Tramadol Acetaminophen (Tylenol)   Oxycodone  Miralax Blood Thinner  Colace  Tramadol   Acetaminophen (Tylenol)   Oxycodone            You may begin to wean off the pain medication as your pain remains controlled with increased activity.  The schedules provided are meant to serve as an example.   You may wean off based on your pain control.  Please note that pain medications are not filled beyond 6 weeks after surgery.              The times below are an example of how to WEAN OFF medications WHILE CONTINUING TO OPTIMIZE PAIN CONTROL.  Your actual medication schedule may vary based on your last dose taken.  Time 12:00am 4:00am 8:00am 12:00pm 4:00pm 8:00pm   Med Tramadol Oxycodone   Tramadol Oxycodone Tramadol Oxycodone     Time 12:00am 6:00am 12:00pm 6:00pm   Med Tramadol Oxycodone   Tramadol Oxycodone     Time 12:00am 8:00am 4:00pm   Med Tramadol Oxycodone   Tramadol     Time 12:00am 12:00pm   Med Tramadol Tramadol       _________________________________________________________________________________________________________________________________________________________________________    OFFICE INFORMATION    OFFICE LOCATIONS    Location 1: [facility]  [address], [address]  Office Number: [phone]    Location 2: [facility]  [address] [address]  Office Number: [phone]    Location 3: [id number] [place] [address]  Office Number: [phone]

## 2023-12-29 ENCOUNTER — ANESTHESIA (OUTPATIENT)
Dept: OPERATING ROOM | Facility: HOSPITAL | Age: 58
End: 2023-12-29
Payer: COMMERCIAL

## 2023-12-29 ENCOUNTER — HOSPITAL ENCOUNTER (OUTPATIENT)
Facility: HOSPITAL | Age: 58
Setting detail: OBSERVATION
Discharge: HOME | End: 2023-12-30
Attending: ORTHOPAEDIC SURGERY | Admitting: ORTHOPAEDIC SURGERY
Payer: COMMERCIAL

## 2023-12-29 ENCOUNTER — APPOINTMENT (OUTPATIENT)
Dept: RADIOLOGY | Facility: HOSPITAL | Age: 58
End: 2023-12-29
Payer: COMMERCIAL

## 2023-12-29 ENCOUNTER — PHARMACY VISIT (OUTPATIENT)
Dept: PHARMACY | Facility: CLINIC | Age: 58
End: 2023-12-29

## 2023-12-29 DIAGNOSIS — M17.12 PRIMARY OSTEOARTHRITIS OF LEFT KNEE: Primary | ICD-10-CM

## 2023-12-29 DIAGNOSIS — M17.0 BILATERAL PRIMARY OSTEOARTHRITIS OF KNEE: ICD-10-CM

## 2023-12-29 PROBLEM — K21.9 GERD (GASTROESOPHAGEAL REFLUX DISEASE): Status: ACTIVE | Noted: 2023-12-29

## 2023-12-29 PROBLEM — J45.909 ASTHMA (HHS-HCC): Status: ACTIVE | Noted: 2023-12-29

## 2023-12-29 LAB
GLUCOSE BLD MANUAL STRIP-MCNC: 141 MG/DL (ref 74–99)
GLUCOSE BLD MANUAL STRIP-MCNC: 310 MG/DL (ref 74–99)

## 2023-12-29 PROCEDURE — 2500000005 HC RX 250 GENERAL PHARMACY W/O HCPCS: Performed by: EMERGENCY MEDICINE

## 2023-12-29 PROCEDURE — A27447 PR TOTAL KNEE ARTHROPLASTY: Performed by: NURSE ANESTHETIST, CERTIFIED REGISTERED

## 2023-12-29 PROCEDURE — 64450 NJX AA&/STRD OTHER PN/BRANCH: CPT | Performed by: ANESTHESIOLOGY

## 2023-12-29 PROCEDURE — G0378 HOSPITAL OBSERVATION PER HR: HCPCS

## 2023-12-29 PROCEDURE — 64447 NJX AA&/STRD FEMORAL NRV IMG: CPT | Performed by: ANESTHESIOLOGY

## 2023-12-29 PROCEDURE — 2780000003 HC OR 278 NO HCPCS: Performed by: ORTHOPAEDIC SURGERY

## 2023-12-29 PROCEDURE — 27447 TOTAL KNEE ARTHROPLASTY: CPT | Performed by: ORTHOPAEDIC SURGERY

## 2023-12-29 PROCEDURE — 82947 ASSAY GLUCOSE BLOOD QUANT: CPT | Mod: 59

## 2023-12-29 PROCEDURE — A4649 SURGICAL SUPPLIES: HCPCS | Performed by: ORTHOPAEDIC SURGERY

## 2023-12-29 PROCEDURE — 2500000004 HC RX 250 GENERAL PHARMACY W/ HCPCS (ALT 636 FOR OP/ED): Performed by: ORTHOPAEDIC SURGERY

## 2023-12-29 PROCEDURE — 2500000004 HC RX 250 GENERAL PHARMACY W/ HCPCS (ALT 636 FOR OP/ED): Performed by: NURSE ANESTHETIST, CERTIFIED REGISTERED

## 2023-12-29 PROCEDURE — 7100000002 HC RECOVERY ROOM TIME - EACH INCREMENTAL 1 MINUTE: Performed by: ORTHOPAEDIC SURGERY

## 2023-12-29 PROCEDURE — 2500000001 HC RX 250 WO HCPCS SELF ADMINISTERED DRUGS (ALT 637 FOR MEDICARE OP): Performed by: EMERGENCY MEDICINE

## 2023-12-29 PROCEDURE — A27447 PR TOTAL KNEE ARTHROPLASTY: Performed by: ANESTHESIOLOGY

## 2023-12-29 PROCEDURE — 7100000001 HC RECOVERY ROOM TIME - INITIAL BASE CHARGE: Performed by: ORTHOPAEDIC SURGERY

## 2023-12-29 PROCEDURE — 2500000005 HC RX 250 GENERAL PHARMACY W/O HCPCS: Performed by: ORTHOPAEDIC SURGERY

## 2023-12-29 PROCEDURE — 2720000007 HC OR 272 NO HCPCS: Performed by: ORTHOPAEDIC SURGERY

## 2023-12-29 PROCEDURE — 97110 THERAPEUTIC EXERCISES: CPT | Mod: GP

## 2023-12-29 PROCEDURE — 3700000001 HC GENERAL ANESTHESIA TIME - INITIAL BASE CHARGE: Performed by: ORTHOPAEDIC SURGERY

## 2023-12-29 PROCEDURE — C1776 JOINT DEVICE (IMPLANTABLE): HCPCS | Performed by: ORTHOPAEDIC SURGERY

## 2023-12-29 PROCEDURE — 73560 X-RAY EXAM OF KNEE 1 OR 2: CPT | Mod: LT

## 2023-12-29 PROCEDURE — 3700000002 HC GENERAL ANESTHESIA TIME - EACH INCREMENTAL 1 MINUTE: Performed by: ORTHOPAEDIC SURGERY

## 2023-12-29 PROCEDURE — 97161 PT EVAL LOW COMPLEX 20 MIN: CPT | Mod: GP

## 2023-12-29 PROCEDURE — 3600000017 HC OR TIME - EACH INCREMENTAL 1 MINUTE - PROCEDURE LEVEL SIX: Performed by: ORTHOPAEDIC SURGERY

## 2023-12-29 PROCEDURE — 97530 THERAPEUTIC ACTIVITIES: CPT | Mod: GP

## 2023-12-29 PROCEDURE — 2500000004 HC RX 250 GENERAL PHARMACY W/ HCPCS (ALT 636 FOR OP/ED): Performed by: EMERGENCY MEDICINE

## 2023-12-29 PROCEDURE — 2500000004 HC RX 250 GENERAL PHARMACY W/ HCPCS (ALT 636 FOR OP/ED): Performed by: ANESTHESIOLOGY

## 2023-12-29 PROCEDURE — 2500000005 HC RX 250 GENERAL PHARMACY W/O HCPCS: Performed by: NURSE ANESTHETIST, CERTIFIED REGISTERED

## 2023-12-29 PROCEDURE — 2500000002 HC RX 250 W HCPCS SELF ADMINISTERED DRUGS (ALT 637 FOR MEDICARE OP, ALT 636 FOR OP/ED): Performed by: STUDENT IN AN ORGANIZED HEALTH CARE EDUCATION/TRAINING PROGRAM

## 2023-12-29 PROCEDURE — 3600000018 HC OR TIME - INITIAL BASE CHARGE - PROCEDURE LEVEL SIX: Performed by: ORTHOPAEDIC SURGERY

## 2023-12-29 PROCEDURE — RXMED WILLOW AMBULATORY MEDICATION CHARGE

## 2023-12-29 DEVICE — TIBIAL BEARING INSERT - CS
Type: IMPLANTABLE DEVICE | Site: KNEE | Status: FUNCTIONAL
Brand: TRIATHLON

## 2023-12-29 DEVICE — CRUCIATE RETAINING FEMORAL
Type: IMPLANTABLE DEVICE | Site: KNEE | Status: FUNCTIONAL
Brand: TRIATHLON

## 2023-12-29 DEVICE — PATELLA
Type: IMPLANTABLE DEVICE | Site: KNEE | Status: FUNCTIONAL
Brand: TRIATHLON

## 2023-12-29 DEVICE — TIBIAL COMPONENT
Type: IMPLANTABLE DEVICE | Site: KNEE | Status: FUNCTIONAL
Brand: TRIATHLON

## 2023-12-29 DEVICE — BONE PINS (3.2MM X 110MM): Type: IMPLANTABLE DEVICE | Site: KNEE | Status: NON-FUNCTIONAL

## 2023-12-29 RX ORDER — METFORMIN HYDROCHLORIDE 500 MG/1
500 TABLET ORAL
Status: DISCONTINUED | OUTPATIENT
Start: 2023-12-30 | End: 2023-12-29

## 2023-12-29 RX ORDER — ONDANSETRON HYDROCHLORIDE 2 MG/ML
INJECTION, SOLUTION INTRAVENOUS AS NEEDED
Status: DISCONTINUED | OUTPATIENT
Start: 2023-12-29 | End: 2023-12-29

## 2023-12-29 RX ORDER — METFORMIN HYDROCHLORIDE 500 MG/1
500 TABLET ORAL
Status: DISCONTINUED | OUTPATIENT
Start: 2023-12-29 | End: 2023-12-29

## 2023-12-29 RX ORDER — HYDROCODONE BITARTRATE AND ACETAMINOPHEN 5; 325 MG/1; MG/1
2 TABLET ORAL EVERY 4 HOURS PRN
Status: DISCONTINUED | OUTPATIENT
Start: 2023-12-29 | End: 2023-12-29

## 2023-12-29 RX ORDER — TRAMADOL HYDROCHLORIDE 50 MG/1
50 TABLET ORAL EVERY 4 HOURS PRN
Status: DISCONTINUED | OUTPATIENT
Start: 2023-12-29 | End: 2023-12-30 | Stop reason: HOSPADM

## 2023-12-29 RX ORDER — PANTOPRAZOLE SODIUM 40 MG/1
40 TABLET, DELAYED RELEASE ORAL
Status: DISCONTINUED | OUTPATIENT
Start: 2023-12-30 | End: 2023-12-30 | Stop reason: HOSPADM

## 2023-12-29 RX ORDER — PROPOFOL 10 MG/ML
INJECTION, EMULSION INTRAVENOUS CONTINUOUS PRN
Status: DISCONTINUED | OUTPATIENT
Start: 2023-12-29 | End: 2023-12-29

## 2023-12-29 RX ORDER — CELECOXIB 200 MG/1
400 CAPSULE ORAL ONCE
Status: DISCONTINUED | OUTPATIENT
Start: 2023-12-29 | End: 2023-12-29 | Stop reason: HOSPADM

## 2023-12-29 RX ORDER — MIDAZOLAM HYDROCHLORIDE 1 MG/ML
2 INJECTION, SOLUTION INTRAMUSCULAR; INTRAVENOUS ONCE
Status: COMPLETED | OUTPATIENT
Start: 2023-12-29 | End: 2023-12-29

## 2023-12-29 RX ORDER — ALBUTEROL SULFATE 0.83 MG/ML
2.5 SOLUTION RESPIRATORY (INHALATION) EVERY 4 HOURS PRN
Status: CANCELLED | OUTPATIENT
Start: 2023-12-29

## 2023-12-29 RX ORDER — IPRATROPIUM BROMIDE AND ALBUTEROL SULFATE 2.5; .5 MG/3ML; MG/3ML
3 SOLUTION RESPIRATORY (INHALATION) EVERY 2 HOUR PRN
Status: DISCONTINUED | OUTPATIENT
Start: 2023-12-29 | End: 2023-12-30 | Stop reason: HOSPADM

## 2023-12-29 RX ORDER — LISINOPRIL 10 MG/1
10 TABLET ORAL DAILY
Status: CANCELLED | OUTPATIENT
Start: 2023-12-29

## 2023-12-29 RX ORDER — LIDOCAINE HYDROCHLORIDE 10 MG/ML
0.1 INJECTION INFILTRATION; PERINEURAL ONCE
Status: DISCONTINUED | OUTPATIENT
Start: 2023-12-29 | End: 2023-12-29 | Stop reason: HOSPADM

## 2023-12-29 RX ORDER — CEFAZOLIN SODIUM 2 G/100ML
2 INJECTION, SOLUTION INTRAVENOUS ONCE
Status: DISCONTINUED | OUTPATIENT
Start: 2023-12-29 | End: 2023-12-29 | Stop reason: HOSPADM

## 2023-12-29 RX ORDER — LISINOPRIL 10 MG/1
10 TABLET ORAL DAILY
Status: DISCONTINUED | OUTPATIENT
Start: 2023-12-30 | End: 2023-12-30 | Stop reason: HOSPADM

## 2023-12-29 RX ORDER — SIMETHICONE 80 MG
80 TABLET,CHEWABLE ORAL 4 TIMES DAILY PRN
Status: DISCONTINUED | OUTPATIENT
Start: 2023-12-29 | End: 2023-12-30 | Stop reason: HOSPADM

## 2023-12-29 RX ORDER — SODIUM CHLORIDE, SODIUM LACTATE, POTASSIUM CHLORIDE, CALCIUM CHLORIDE 600; 310; 30; 20 MG/100ML; MG/100ML; MG/100ML; MG/100ML
100 INJECTION, SOLUTION INTRAVENOUS CONTINUOUS
Status: DISCONTINUED | OUTPATIENT
Start: 2023-12-29 | End: 2023-12-30 | Stop reason: HOSPADM

## 2023-12-29 RX ORDER — OXYCODONE HYDROCHLORIDE 5 MG/1
5 TABLET ORAL EVERY 4 HOURS PRN
Status: DISCONTINUED | OUTPATIENT
Start: 2023-12-29 | End: 2023-12-29 | Stop reason: HOSPADM

## 2023-12-29 RX ORDER — METFORMIN HYDROCHLORIDE 500 MG/1
1000 TABLET, EXTENDED RELEASE ORAL
Status: CANCELLED | OUTPATIENT
Start: 2023-12-30

## 2023-12-29 RX ORDER — ESCITALOPRAM OXALATE 10 MG/1
10 TABLET ORAL DAILY
Status: CANCELLED | OUTPATIENT
Start: 2023-12-29

## 2023-12-29 RX ORDER — CYCLOBENZAPRINE HCL 10 MG
5 TABLET ORAL 3 TIMES DAILY PRN
Status: DISCONTINUED | OUTPATIENT
Start: 2023-12-29 | End: 2023-12-30 | Stop reason: HOSPADM

## 2023-12-29 RX ORDER — BISACODYL 5 MG
10 TABLET, DELAYED RELEASE (ENTERIC COATED) ORAL DAILY PRN
Status: DISCONTINUED | OUTPATIENT
Start: 2023-12-29 | End: 2023-12-30 | Stop reason: HOSPADM

## 2023-12-29 RX ORDER — HYDROCODONE BITARTRATE AND ACETAMINOPHEN 5; 325 MG/1; MG/1
2 TABLET ORAL EVERY 4 HOURS PRN
Status: DISCONTINUED | OUTPATIENT
Start: 2023-12-29 | End: 2023-12-30 | Stop reason: HOSPADM

## 2023-12-29 RX ORDER — ATORVASTATIN CALCIUM 20 MG/1
20 TABLET, FILM COATED ORAL NIGHTLY
Status: DISCONTINUED | OUTPATIENT
Start: 2023-12-29 | End: 2023-12-30 | Stop reason: HOSPADM

## 2023-12-29 RX ORDER — MEPERIDINE HYDROCHLORIDE 25 MG/ML
12.5 INJECTION INTRAMUSCULAR; INTRAVENOUS; SUBCUTANEOUS EVERY 10 MIN PRN
Status: DISCONTINUED | OUTPATIENT
Start: 2023-12-29 | End: 2023-12-29 | Stop reason: HOSPADM

## 2023-12-29 RX ORDER — ALUMINUM HYDROXIDE, MAGNESIUM HYDROXIDE, AND SIMETHICONE 1200; 120; 1200 MG/30ML; MG/30ML; MG/30ML
20 SUSPENSION ORAL EVERY 4 HOURS PRN
Status: DISCONTINUED | OUTPATIENT
Start: 2023-12-29 | End: 2023-12-30 | Stop reason: HOSPADM

## 2023-12-29 RX ORDER — DEXTROSE MONOHYDRATE 100 MG/ML
0.3 INJECTION, SOLUTION INTRAVENOUS ONCE AS NEEDED
Status: DISCONTINUED | OUTPATIENT
Start: 2023-12-29 | End: 2023-12-30 | Stop reason: HOSPADM

## 2023-12-29 RX ORDER — INSULIN LISPRO 100 [IU]/ML
0-5 INJECTION, SOLUTION INTRAVENOUS; SUBCUTANEOUS
Status: DISCONTINUED | OUTPATIENT
Start: 2023-12-29 | End: 2023-12-30 | Stop reason: HOSPADM

## 2023-12-29 RX ORDER — HYDROMORPHONE HYDROCHLORIDE 2 MG/ML
INJECTION, SOLUTION INTRAMUSCULAR; INTRAVENOUS; SUBCUTANEOUS AS NEEDED
Status: DISCONTINUED | OUTPATIENT
Start: 2023-12-29 | End: 2023-12-29

## 2023-12-29 RX ORDER — DEXAMETHASONE SODIUM PHOSPHATE 4 MG/ML
INJECTION, SOLUTION INTRA-ARTICULAR; INTRALESIONAL; INTRAMUSCULAR; INTRAVENOUS; SOFT TISSUE AS NEEDED
Status: DISCONTINUED | OUTPATIENT
Start: 2023-12-29 | End: 2023-12-29

## 2023-12-29 RX ORDER — BIMATOPROST 3 UG/ML
SOLUTION TOPICAL NIGHTLY
Status: CANCELLED | OUTPATIENT
Start: 2023-12-29

## 2023-12-29 RX ORDER — ASPIRIN 81 MG/1
81 TABLET ORAL 2 TIMES DAILY
Status: DISCONTINUED | OUTPATIENT
Start: 2023-12-29 | End: 2023-12-30 | Stop reason: HOSPADM

## 2023-12-29 RX ORDER — VANCOMYCIN HYDROCHLORIDE 1 G/20ML
INJECTION, POWDER, LYOPHILIZED, FOR SOLUTION INTRAVENOUS AS NEEDED
Status: DISCONTINUED | OUTPATIENT
Start: 2023-12-29 | End: 2023-12-29 | Stop reason: HOSPADM

## 2023-12-29 RX ORDER — SENNOSIDES 8.6 MG/1
2 TABLET ORAL 2 TIMES DAILY
Status: DISCONTINUED | OUTPATIENT
Start: 2023-12-29 | End: 2023-12-30 | Stop reason: HOSPADM

## 2023-12-29 RX ORDER — ACETAMINOPHEN 325 MG/1
975 TABLET ORAL ONCE
Status: COMPLETED | OUTPATIENT
Start: 2023-12-29 | End: 2023-12-29

## 2023-12-29 RX ORDER — PHENYLEPHRINE HCL IN 0.9% NACL 0.4MG/10ML
SYRINGE (ML) INTRAVENOUS AS NEEDED
Status: DISCONTINUED | OUTPATIENT
Start: 2023-12-29 | End: 2023-12-29

## 2023-12-29 RX ORDER — ONDANSETRON 4 MG/1
4 TABLET, ORALLY DISINTEGRATING ORAL EVERY 8 HOURS PRN
Status: DISCONTINUED | OUTPATIENT
Start: 2023-12-29 | End: 2023-12-30 | Stop reason: HOSPADM

## 2023-12-29 RX ORDER — FENTANYL CITRATE 50 UG/ML
50 INJECTION, SOLUTION INTRAMUSCULAR; INTRAVENOUS EVERY 5 MIN PRN
Status: DISCONTINUED | OUTPATIENT
Start: 2023-12-29 | End: 2023-12-29 | Stop reason: HOSPADM

## 2023-12-29 RX ORDER — FENTANYL CITRATE 50 UG/ML
50 INJECTION, SOLUTION INTRAMUSCULAR; INTRAVENOUS ONCE
Status: COMPLETED | OUTPATIENT
Start: 2023-12-29 | End: 2023-12-29

## 2023-12-29 RX ORDER — DIPHENHYDRAMINE HYDROCHLORIDE 50 MG/ML
12.5 INJECTION INTRAMUSCULAR; INTRAVENOUS EVERY 6 HOURS PRN
Status: DISCONTINUED | OUTPATIENT
Start: 2023-12-29 | End: 2023-12-30 | Stop reason: HOSPADM

## 2023-12-29 RX ORDER — FENTANYL CITRATE 50 UG/ML
25 INJECTION, SOLUTION INTRAMUSCULAR; INTRAVENOUS EVERY 5 MIN PRN
Status: DISCONTINUED | OUTPATIENT
Start: 2023-12-29 | End: 2023-12-29 | Stop reason: HOSPADM

## 2023-12-29 RX ORDER — SODIUM CHLORIDE, SODIUM LACTATE, POTASSIUM CHLORIDE, CALCIUM CHLORIDE 600; 310; 30; 20 MG/100ML; MG/100ML; MG/100ML; MG/100ML
100 INJECTION, SOLUTION INTRAVENOUS CONTINUOUS
Status: DISCONTINUED | OUTPATIENT
Start: 2023-12-29 | End: 2023-12-29 | Stop reason: HOSPADM

## 2023-12-29 RX ORDER — NALOXONE HYDROCHLORIDE 0.4 MG/ML
0.2 INJECTION, SOLUTION INTRAMUSCULAR; INTRAVENOUS; SUBCUTANEOUS EVERY 5 MIN PRN
Status: DISCONTINUED | OUTPATIENT
Start: 2023-12-29 | End: 2023-12-30 | Stop reason: HOSPADM

## 2023-12-29 RX ORDER — VANCOMYCIN HYDROCHLORIDE 1 G/20ML
1 INJECTION, POWDER, LYOPHILIZED, FOR SOLUTION INTRAVENOUS ONCE
Status: DISCONTINUED | OUTPATIENT
Start: 2023-12-29 | End: 2023-12-29 | Stop reason: HOSPADM

## 2023-12-29 RX ORDER — TRAMADOL HYDROCHLORIDE 50 MG/1
50 TABLET ORAL ONCE
Status: DISCONTINUED | OUTPATIENT
Start: 2023-12-29 | End: 2023-12-29 | Stop reason: HOSPADM

## 2023-12-29 RX ORDER — LABETALOL HYDROCHLORIDE 5 MG/ML
5 INJECTION, SOLUTION INTRAVENOUS ONCE AS NEEDED
Status: DISCONTINUED | OUTPATIENT
Start: 2023-12-29 | End: 2023-12-29 | Stop reason: HOSPADM

## 2023-12-29 RX ORDER — METOCLOPRAMIDE HYDROCHLORIDE 5 MG/ML
10 INJECTION INTRAMUSCULAR; INTRAVENOUS EVERY 6 HOURS PRN
Status: DISCONTINUED | OUTPATIENT
Start: 2023-12-29 | End: 2023-12-30 | Stop reason: HOSPADM

## 2023-12-29 RX ORDER — TRANEXAMIC ACID 100 MG/ML
INJECTION, SOLUTION INTRAVENOUS AS NEEDED
Status: DISCONTINUED | OUTPATIENT
Start: 2023-12-29 | End: 2023-12-29

## 2023-12-29 RX ORDER — ATORVASTATIN CALCIUM 20 MG/1
20 TABLET, FILM COATED ORAL DAILY
Status: CANCELLED | OUTPATIENT
Start: 2023-12-29

## 2023-12-29 RX ORDER — ROPIVACAINE/EPI/CLONIDINE/KET 2.46-0.005
SYRINGE (ML) INJECTION AS NEEDED
Status: DISCONTINUED | OUTPATIENT
Start: 2023-12-29 | End: 2023-12-29 | Stop reason: HOSPADM

## 2023-12-29 RX ORDER — DIPHENHYDRAMINE HCL 25 MG
25 TABLET ORAL EVERY 6 HOURS PRN
Status: DISCONTINUED | OUTPATIENT
Start: 2023-12-29 | End: 2023-12-30 | Stop reason: HOSPADM

## 2023-12-29 RX ORDER — VANCOMYCIN 1 G/200ML
1 INJECTION, SOLUTION INTRAVENOUS ONCE
Status: DISCONTINUED | OUTPATIENT
Start: 2023-12-29 | End: 2023-12-29 | Stop reason: DRUGHIGH

## 2023-12-29 RX ORDER — ONDANSETRON HYDROCHLORIDE 2 MG/ML
4 INJECTION, SOLUTION INTRAVENOUS ONCE AS NEEDED
Status: DISCONTINUED | OUTPATIENT
Start: 2023-12-29 | End: 2023-12-29 | Stop reason: HOSPADM

## 2023-12-29 RX ORDER — METFORMIN HYDROCHLORIDE 1000 MG/1
1000 TABLET, FILM COATED, EXTENDED RELEASE ORAL
Status: DISCONTINUED | OUTPATIENT
Start: 2023-12-30 | End: 2023-12-29 | Stop reason: CLARIF

## 2023-12-29 RX ORDER — SCOLOPAMINE TRANSDERMAL SYSTEM 1 MG/1
1 PATCH, EXTENDED RELEASE TRANSDERMAL
Status: DISCONTINUED | OUTPATIENT
Start: 2023-12-29 | End: 2023-12-30 | Stop reason: HOSPADM

## 2023-12-29 RX ORDER — FENTANYL CITRATE 50 UG/ML
INJECTION, SOLUTION INTRAMUSCULAR; INTRAVENOUS AS NEEDED
Status: DISCONTINUED | OUTPATIENT
Start: 2023-12-29 | End: 2023-12-29

## 2023-12-29 RX ORDER — ESCITALOPRAM OXALATE 10 MG/1
10 TABLET ORAL DAILY
Status: DISCONTINUED | OUTPATIENT
Start: 2023-12-30 | End: 2023-12-30 | Stop reason: HOSPADM

## 2023-12-29 RX ORDER — DEXTROSE 50 % IN WATER (D50W) INTRAVENOUS SYRINGE
25
Status: DISCONTINUED | OUTPATIENT
Start: 2023-12-29 | End: 2023-12-30 | Stop reason: HOSPADM

## 2023-12-29 RX ORDER — ACETAMINOPHEN 325 MG/1
650 TABLET ORAL EVERY 6 HOURS SCHEDULED
Status: DISCONTINUED | OUTPATIENT
Start: 2023-12-29 | End: 2023-12-30 | Stop reason: HOSPADM

## 2023-12-29 RX ORDER — CEFAZOLIN SODIUM 2 G/100ML
2 INJECTION, SOLUTION INTRAVENOUS EVERY 8 HOURS
Status: COMPLETED | OUTPATIENT
Start: 2023-12-29 | End: 2023-12-30

## 2023-12-29 RX ORDER — HYDROCODONE BITARTRATE AND ACETAMINOPHEN 5; 325 MG/1; MG/1
1 TABLET ORAL EVERY 4 HOURS PRN
Status: DISCONTINUED | OUTPATIENT
Start: 2023-12-29 | End: 2023-12-30 | Stop reason: HOSPADM

## 2023-12-29 RX ORDER — KETOROLAC TROMETHAMINE 15 MG/ML
15 INJECTION, SOLUTION INTRAMUSCULAR; INTRAVENOUS EVERY 6 HOURS
Status: COMPLETED | OUTPATIENT
Start: 2023-12-29 | End: 2023-12-30

## 2023-12-29 RX ORDER — PROPOFOL 10 MG/ML
INJECTION, EMULSION INTRAVENOUS AS NEEDED
Status: DISCONTINUED | OUTPATIENT
Start: 2023-12-29 | End: 2023-12-29

## 2023-12-29 RX ORDER — TALC
3 POWDER (GRAM) TOPICAL NIGHTLY PRN
Status: DISCONTINUED | OUTPATIENT
Start: 2023-12-29 | End: 2023-12-30 | Stop reason: HOSPADM

## 2023-12-29 RX ORDER — ONDANSETRON HYDROCHLORIDE 2 MG/ML
4 INJECTION, SOLUTION INTRAVENOUS EVERY 8 HOURS PRN
Status: DISCONTINUED | OUTPATIENT
Start: 2023-12-29 | End: 2023-12-30 | Stop reason: HOSPADM

## 2023-12-29 RX ORDER — VANCOMYCIN 1.5 G/300ML
1500 INJECTION, SOLUTION INTRAVENOUS ONCE
Status: COMPLETED | OUTPATIENT
Start: 2023-12-29 | End: 2023-12-29

## 2023-12-29 RX ORDER — INSULIN LISPRO 100 [IU]/ML
0-5 INJECTION, SOLUTION INTRAVENOUS; SUBCUTANEOUS
Status: DISCONTINUED | OUTPATIENT
Start: 2023-12-29 | End: 2023-12-29

## 2023-12-29 RX ORDER — METOCLOPRAMIDE 10 MG/1
10 TABLET ORAL EVERY 6 HOURS PRN
Status: DISCONTINUED | OUTPATIENT
Start: 2023-12-29 | End: 2023-12-30 | Stop reason: HOSPADM

## 2023-12-29 RX ADMIN — HYDROMORPHONE HYDROCHLORIDE 0.4 MG: 2 INJECTION, SOLUTION INTRAMUSCULAR; INTRAVENOUS; SUBCUTANEOUS at 13:39

## 2023-12-29 RX ADMIN — SODIUM CHLORIDE, SODIUM LACTATE, POTASSIUM CHLORIDE, AND CALCIUM CHLORIDE 100 ML/HR: 600; 310; 30; 20 INJECTION, SOLUTION INTRAVENOUS at 10:11

## 2023-12-29 RX ADMIN — SCOPALAMINE 1 PATCH: 1 PATCH, EXTENDED RELEASE TRANSDERMAL at 10:14

## 2023-12-29 RX ADMIN — CEFAZOLIN SODIUM 2 G: 2 INJECTION, SOLUTION INTRAVENOUS at 17:34

## 2023-12-29 RX ADMIN — ONDANSETRON 4 MG: 2 INJECTION, SOLUTION INTRAMUSCULAR; INTRAVENOUS at 12:33

## 2023-12-29 RX ADMIN — VANCOMYCIN 1.5 G: 1.5 INJECTION, SOLUTION INTRAVENOUS at 10:26

## 2023-12-29 RX ADMIN — DEXAMETHASONE SODIUM PHOSPHATE 8 MG: 4 INJECTION, SOLUTION INTRAMUSCULAR; INTRAVENOUS at 12:26

## 2023-12-29 RX ADMIN — FENTANYL CITRATE 25 MCG: 0.05 INJECTION, SOLUTION INTRAMUSCULAR; INTRAVENOUS at 12:53

## 2023-12-29 RX ADMIN — ACETAMINOPHEN 975 MG: 325 TABLET ORAL at 10:35

## 2023-12-29 RX ADMIN — FENTANYL CITRATE 50 MCG: 50 INJECTION INTRAMUSCULAR; INTRAVENOUS at 14:05

## 2023-12-29 RX ADMIN — POVIDONE-IODINE 1 APPLICATION: 5 SOLUTION TOPICAL at 10:14

## 2023-12-29 RX ADMIN — SODIUM CHLORIDE, SODIUM LACTATE, POTASSIUM CHLORIDE, AND CALCIUM CHLORIDE 100 ML/HR: 600; 310; 30; 20 INJECTION, SOLUTION INTRAVENOUS at 15:53

## 2023-12-29 RX ADMIN — KETOROLAC TROMETHAMINE 15 MG: 15 INJECTION, SOLUTION INTRAMUSCULAR; INTRAVENOUS at 17:35

## 2023-12-29 RX ADMIN — ASPIRIN 81 MG: 81 TABLET, COATED ORAL at 21:05

## 2023-12-29 RX ADMIN — HYDROMORPHONE HYDROCHLORIDE 0.8 MG: 2 INJECTION, SOLUTION INTRAMUSCULAR; INTRAVENOUS; SUBCUTANEOUS at 13:57

## 2023-12-29 RX ADMIN — ACETAMINOPHEN 650 MG: 325 TABLET ORAL at 17:35

## 2023-12-29 RX ADMIN — ATORVASTATIN CALCIUM 20 MG: 20 TABLET, FILM COATED ORAL at 21:05

## 2023-12-29 RX ADMIN — PROPOFOL 50 MG: 10 INJECTION, EMULSION INTRAVENOUS at 12:15

## 2023-12-29 RX ADMIN — FENTANYL CITRATE 50 MCG: 50 INJECTION INTRAMUSCULAR; INTRAVENOUS at 11:25

## 2023-12-29 RX ADMIN — INSULIN LISPRO 4 UNITS: 100 INJECTION, SOLUTION INTRAVENOUS; SUBCUTANEOUS at 22:18

## 2023-12-29 RX ADMIN — FENTANYL CITRATE 50 MCG: 50 INJECTION INTRAMUSCULAR; INTRAVENOUS at 13:55

## 2023-12-29 RX ADMIN — Medication 2 L/MIN: at 20:00

## 2023-12-29 RX ADMIN — TRANEXAMIC ACID 1000 MG: 100 INJECTION, SOLUTION INTRAVENOUS at 13:07

## 2023-12-29 RX ADMIN — FENTANYL CITRATE 50 MCG: 50 INJECTION INTRAMUSCULAR; INTRAVENOUS at 14:30

## 2023-12-29 RX ADMIN — SENNOSIDES 17.2 MG: 8.6 TABLET, FILM COATED ORAL at 21:05

## 2023-12-29 RX ADMIN — Medication 200 MCG: at 13:19

## 2023-12-29 RX ADMIN — SODIUM CHLORIDE, SODIUM LACTATE, POTASSIUM CHLORIDE, AND CALCIUM CHLORIDE: 600; 310; 30; 20 INJECTION, SOLUTION INTRAVENOUS at 13:34

## 2023-12-29 RX ADMIN — FENTANYL CITRATE 25 MCG: 0.05 INJECTION, SOLUTION INTRAMUSCULAR; INTRAVENOUS at 12:27

## 2023-12-29 RX ADMIN — PROPOFOL 150 MG: 10 INJECTION, EMULSION INTRAVENOUS at 12:13

## 2023-12-29 RX ADMIN — Medication 200 MCG: at 12:58

## 2023-12-29 RX ADMIN — Medication 2 L/MIN: at 15:54

## 2023-12-29 RX ADMIN — HYDROMORPHONE HYDROCHLORIDE 0.4 MG: 2 INJECTION, SOLUTION INTRAMUSCULAR; INTRAVENOUS; SUBCUTANEOUS at 13:15

## 2023-12-29 RX ADMIN — PROPOFOL 50 MCG/KG/MIN: 10 INJECTION, EMULSION INTRAVENOUS at 12:15

## 2023-12-29 RX ADMIN — MIDAZOLAM 2 MG: 1 INJECTION INTRAMUSCULAR; INTRAVENOUS at 11:25

## 2023-12-29 RX ADMIN — TRANEXAMIC ACID 1000 MG: 100 INJECTION, SOLUTION INTRAVENOUS at 12:17

## 2023-12-29 RX ADMIN — HYDROMORPHONE HYDROCHLORIDE 0.4 MG: 2 INJECTION, SOLUTION INTRAMUSCULAR; INTRAVENOUS; SUBCUTANEOUS at 13:47

## 2023-12-29 SDOH — HEALTH STABILITY: MENTAL HEALTH: CURRENT SMOKER: 0

## 2023-12-29 SDOH — SOCIAL STABILITY: SOCIAL INSECURITY: ABUSE: ADULT

## 2023-12-29 SDOH — SOCIAL STABILITY: SOCIAL INSECURITY: HAVE YOU HAD THOUGHTS OF HARMING ANYONE ELSE?: NO

## 2023-12-29 SDOH — SOCIAL STABILITY: SOCIAL INSECURITY: ARE THERE ANY APPARENT SIGNS OF INJURIES/BEHAVIORS THAT COULD BE RELATED TO ABUSE/NEGLECT?: NO

## 2023-12-29 SDOH — SOCIAL STABILITY: SOCIAL INSECURITY: DOES ANYONE TRY TO KEEP YOU FROM HAVING/CONTACTING OTHER FRIENDS OR DOING THINGS OUTSIDE YOUR HOME?: NO

## 2023-12-29 SDOH — SOCIAL STABILITY: SOCIAL INSECURITY: WERE YOU ABLE TO COMPLETE ALL THE BEHAVIORAL HEALTH SCREENINGS?: YES

## 2023-12-29 SDOH — SOCIAL STABILITY: SOCIAL INSECURITY: DO YOU FEEL UNSAFE GOING BACK TO THE PLACE WHERE YOU ARE LIVING?: NO

## 2023-12-29 SDOH — SOCIAL STABILITY: SOCIAL INSECURITY: HAS ANYONE EVER THREATENED TO HURT YOUR FAMILY OR YOUR PETS?: NO

## 2023-12-29 SDOH — SOCIAL STABILITY: SOCIAL INSECURITY: ARE YOU OR HAVE YOU BEEN THREATENED OR ABUSED PHYSICALLY, EMOTIONALLY, OR SEXUALLY BY ANYONE?: NO

## 2023-12-29 SDOH — SOCIAL STABILITY: SOCIAL INSECURITY: DO YOU FEEL ANYONE HAS EXPLOITED OR TAKEN ADVANTAGE OF YOU FINANCIALLY OR OF YOUR PERSONAL PROPERTY?: NO

## 2023-12-29 ASSESSMENT — COGNITIVE AND FUNCTIONAL STATUS - GENERAL
TURNING FROM BACK TO SIDE WHILE IN FLAT BAD: A LITTLE
DAILY ACTIVITIY SCORE: 21
DRESSING REGULAR LOWER BODY CLOTHING: A LITTLE
STANDING UP FROM CHAIR USING ARMS: A LITTLE
WALKING IN HOSPITAL ROOM: A LITTLE
MOBILITY SCORE: 21
CLIMB 3 TO 5 STEPS WITH RAILING: A LITTLE
DRESSING REGULAR UPPER BODY CLOTHING: A LITTLE
MOVING FROM LYING ON BACK TO SITTING ON SIDE OF FLAT BED WITH BEDRAILS: A LITTLE
CLIMB 3 TO 5 STEPS WITH RAILING: A LITTLE
STANDING UP FROM CHAIR USING ARMS: A LITTLE
TURNING FROM BACK TO SIDE WHILE IN FLAT BAD: A LITTLE
MOBILITY SCORE: 18
HELP NEEDED FOR BATHING: A LITTLE
PATIENT BASELINE BEDBOUND: NO
MOVING TO AND FROM BED TO CHAIR: A LITTLE

## 2023-12-29 ASSESSMENT — PATIENT HEALTH QUESTIONNAIRE - PHQ9
2. FEELING DOWN, DEPRESSED OR HOPELESS: NOT AT ALL
1. LITTLE INTEREST OR PLEASURE IN DOING THINGS: NOT AT ALL
SUM OF ALL RESPONSES TO PHQ9 QUESTIONS 1 & 2: 0

## 2023-12-29 ASSESSMENT — PAIN - FUNCTIONAL ASSESSMENT
PAIN_FUNCTIONAL_ASSESSMENT: 0-10

## 2023-12-29 ASSESSMENT — LIFESTYLE VARIABLES
HOW OFTEN DO YOU HAVE 6 OR MORE DRINKS ON ONE OCCASION: NEVER
SUBSTANCE_ABUSE_PAST_12_MONTHS: NO
AUDIT-C TOTAL SCORE: 0
AUDIT-C TOTAL SCORE: 0
HOW MANY STANDARD DRINKS CONTAINING ALCOHOL DO YOU HAVE ON A TYPICAL DAY: PATIENT DOES NOT DRINK
PRESCIPTION_ABUSE_PAST_12_MONTHS: NO
HOW OFTEN DO YOU HAVE A DRINK CONTAINING ALCOHOL: NEVER
SKIP TO QUESTIONS 9-10: 1

## 2023-12-29 ASSESSMENT — PAIN SCALES - GENERAL
PAINLEVEL_OUTOF10: 7
PAINLEVEL_OUTOF10: 4
PAINLEVEL_OUTOF10: 3
PAINLEVEL_OUTOF10: 7
PAINLEVEL_OUTOF10: 6
PAINLEVEL_OUTOF10: 4
PAINLEVEL_OUTOF10: 2
PAINLEVEL_OUTOF10: 3

## 2023-12-29 ASSESSMENT — ACTIVITIES OF DAILY LIVING (ADL)
ADEQUATE_TO_COMPLETE_ADL: YES
TOILETING: INDEPENDENT
ASSISTIVE_DEVICE: WALKER
DRESSING YOURSELF: INDEPENDENT
PATIENT'S MEMORY ADEQUATE TO SAFELY COMPLETE DAILY ACTIVITIES?: YES
FEEDING YOURSELF: INDEPENDENT
ADL_ASSISTANCE: INDEPENDENT
BATHING: INDEPENDENT
GROOMING: INDEPENDENT
JUDGMENT_ADEQUATE_SAFELY_COMPLETE_DAILY_ACTIVITIES: YES
HEARING - LEFT EAR: FUNCTIONAL
LACK_OF_TRANSPORTATION: NO
HEARING - RIGHT EAR: FUNCTIONAL
WALKS IN HOME: INDEPENDENT

## 2023-12-29 ASSESSMENT — COLUMBIA-SUICIDE SEVERITY RATING SCALE - C-SSRS
1. IN THE PAST MONTH, HAVE YOU WISHED YOU WERE DEAD OR WISHED YOU COULD GO TO SLEEP AND NOT WAKE UP?: NO
6. HAVE YOU EVER DONE ANYTHING, STARTED TO DO ANYTHING, OR PREPARED TO DO ANYTHING TO END YOUR LIFE?: NO
2. HAVE YOU ACTUALLY HAD ANY THOUGHTS OF KILLING YOURSELF?: NO

## 2023-12-29 ASSESSMENT — ENCOUNTER SYMPTOMS
EYES NEGATIVE: 1
CARDIOVASCULAR NEGATIVE: 1
RESPIRATORY NEGATIVE: 1
CONSTITUTIONAL NEGATIVE: 1
PSYCHIATRIC NEGATIVE: 1
ENDOCRINE NEGATIVE: 1
NEUROLOGICAL NEGATIVE: 1
GASTROINTESTINAL NEGATIVE: 1

## 2023-12-29 ASSESSMENT — PAIN DESCRIPTION - DESCRIPTORS: DESCRIPTORS: ACHING

## 2023-12-29 ASSESSMENT — PAIN DESCRIPTION - ORIENTATION: ORIENTATION: LEFT

## 2023-12-29 ASSESSMENT — PAIN DESCRIPTION - LOCATION: LOCATION: KNEE

## 2023-12-29 NOTE — PROGRESS NOTES
Physical Therapy    Physical Therapy Evaluation & Treatment    Patient Name: Bree Bojorquez  MRN: [id number]  Today's Date: 12/29/2023   Time Calculation  Start Time: 1613  Stop Time: 1700  Time Calculation (min): 47 min    Assessment/Plan   PT Assessment  PT Assessment Results: Decreased endurance, Decreased range of motion, Decreased strength, Decreased mobility, Decreased coordination, Orthopedic restrictions, Pain  Rehab Prognosis: Excellent  Evaluation/Treatment Tolerance: Patient tolerated treatment well, Patient limited by fatigue  Medical Staff Made Aware: Yes  Strengths: Ability to acquire knowledge, Access to adaptive/assistive products, Attitude of self, Capable of completing ADLs semi/independent, Coping skills, Physical health, Premorbid level of function, Support of Caregivers  Barriers to Participation: Comorbidities  End of Session Communication: Bedside nurse  Assessment Comment: PT eval low. Pt presenting to eval with minor deficits in functional mobility, impaired ROM and strength on LLE and reduced endurance for activity likely from post op status. Pt needing 2L O2 during session but no complaints of SOB/lightheadedness. Pt introduced too and verbalized understanding HEP and L knee precautions. PT recommends [facility] PT with assist as needed.  will be there for assistance.  End of Session Patient Position: Bed, 3 rail up, Alarm on (RN aware/ call button in reach)   IP OR SWING BED PT PLAN  Inpatient or Swing Bed: Inpatient  PT Plan  Treatment/Interventions: Bed mobility, Transfer training, Gait training, Stair training, Strengthening, Endurance training, Range of motion, Home exercise program  PT Plan: Skilled PT  PT Frequency: BID  PT Discharge Recommendations: Low intensity level of continued care  Equipment Recommended upon Discharge: Wheeled walker, Straight cane  PT Recommended Transfer Status: Assistive device, Contact guard  PT - OK to Discharge:  (will practice steps at next session  for DC home)      Subjective     General Visit Information:  General  Reason for Referral: Pt presenting to [facility] on 12/29/23 for L TKA by Dr. reed  Past Medical History Relevant to Rehab: depression/anxiety, basal cell carcinoma, gerd, HLD, HTN, R TKA, metabolic syndrome, breast surgery after abdmornal mammogram, salpingoopherectomy  Family/Caregiver Present: Yes  Prior to Session Communication: Bedside nurse  Patient Position Received: Bed, 3 rail up  General Comment: cleared by RN and agreeable to session. lethargic but able to participate in therapy  Home Living:  Home Living  Type of Home: House  Lives With: Spouse  Home Adaptive Equipment: Walker rolling or standard, Cane, Reacher  Home Layout: Two level, Able to live on main level with bedroom/bathroom, Stairs to alternate level with rails  Alternate Level Stairs-Number of Steps: 1 rail + 12 steps to second floor but they do not have to use  Home Access: Stairs to enter with rails  Entrance Stairs-Number of Steps: 1 rail +  3 steps  Prior Level of Function:  Prior Function Per Pt/Caregiver Report  Level of Dana: Independent with ADLs and functional transfers  ADL Assistance: Independent  Homemaking Assistance: Independent  Ambulatory Assistance: Independent  Precautions:  Precautions  LE Weight Bearing Status: Weight Bearing as Tolerated  Post-Surgical Precautions: Left total knee precautions  Vital Signs:  Vital Signs  SpO2:  (on 2 L O2)    Objective   Pain:  Pain Assessment  Pain Assessment: 0-10  Pain Score: 2  Pain Type: Surgical pain  Pain Location: Knee  Pain Orientation: Left  Pain Descriptors: Aching  Pain Interventions: Cold applied, Repositioned, Rest  Response to Interventions: continued therapy,  Cognition:  Cognition  Overall Cognitive Status: Within Functional Limits  Attention: Within Functional Limits  Memory: Within Funtional Limits  Problem Solving: Within Functional Limits  Numeric Reasoning: Within Functional Limits  Abstract  Reasoning: Within Functional Limits  Safety/Judgement: Within Functional Limits  Insight: Within function limits  Impulsive: Within functional limits  Processing Speed: Delayed (likely from lethargic post op)    General Assessments:  General Observation  General Observation: dressing dry, clean, and intact on L knee underace wrap               Sensation  Light Touch: No apparent deficits  Sensation Comment: on BLE including S1 to L 4    Coordination  Movements are Fluid and Coordinated: No  Lower Body Coordination: impaired from post op limitations    Postural Control  Postural Control: Within Functional Limits    Static Sitting Balance  Static Sitting-Level of Assistance: Independent  Dynamic Sitting Balance  Dynamic Sitting-Comments: distant supervision reaching outside CORRINE due to fatigue levels    Static Standing Balance  Static Standing-Level of Assistance: Close supervision  Static Standing-Comment/Number of Minutes: with RW  Dynamic Standing Balance  Dynamic Standing-Comments: close supervision with RW  Functional Assessments:  Bed Mobility  Bed Mobility: Yes  Bed Mobility 1  Bed Mobility 1: Supine to sitting, Sitting to supine, Scooting  Level of Assistance 1: Contact guard  Bed Mobility Comments 1: transfered into and out of bed with CGx1 and some cueing from PT for activity. PT managing IV line and NC. ableto sit on EOB without issues    Transfers  Transfer: Yes  Transfer 1  Technique 1: Sit to stand, Stand to sit  Transfer Device 1: Walker  Transfer Level of Assistance 1: Contact guard, Minimum assistance  Trials/Comments 1: pt transferred from bed to standing with min A x1 and cueign from PT for activity and then from commode with grab bar CGx1 to min A x1 with cueing for activity as well. RW present for stabilization once upright. hand placement cued and corrected. no buckle or LOB. able to weight bear equally and march in palce.    Ambulation/Gait Training  Ambulation/Gait Training Performed:  Yes  Ambulation/Gait Training 1  Device 1: Rolling walker  Assistance 1: Close supervision, Contact guard  Quality of Gait 1: Decreased step length, Antalgic  Comments/Distance (ft) 1: pt ambulated 10 feetx1 at CGx1 while PT managed IV LIne and NC and pt used RW and additional 10 feetx1 with RW at CSx1. Pt displaying slower brissa for both sets of ambulation however step to pattern used for first and reciprocal used for second, no buckle or LOB with either. antalgic stepping noted.  Extremity/Trunk Assessments:  RUE   RUE : Within Functional Limits  LUE   LUE: Within Functional Limits  RLE   RLE : Within Functional Limits  LLE   LLE : Within Functional Limits  Treatments:  Therapeutic Exercise  Therapeutic Exercise Performed: Yes  Therapeutic Exercise Activity 1: ankle pumps x10  Therapeutic Exercise Activity 2: quad sets x7  Therapeutic Exercise Activity 3: glute sets x10  Therapeutic Exercise Activity 4: heel slides x10 with assist  Therapeutic Exercise Activity 5: hip abductionx5  Therapeutic Exercise Activity 6: SAQ x7  Therapeutic Exercise Activity 7: SLR x5    Therapeutic Activity  Therapeutic Activity Performed: Yes  Therapeutic Activity 1: pt able to toilet with supervision for balance but able to complete urination/hygiene/hand hygiene without assistance. Pt able to stand and balance towash hands without UE support and no buckle or LOB. PT managing equipment for patient.  Outcome Measures:  [facility] Basic Mobility  Turning from your back to your side while in a flat bed without using bedrails: None  Moving from lying on your back to sitting on the side of a flat bed without using bedrails: A little  Moving to and from bed to chair (including a wheelchair): None  Standing up from a chair using your arms (e.g. wheelchair or bedside chair): A little  To walk in hospital room: None  Climbing 3-5 steps with railing: A little  Basic Mobility - Total Score: 21    Encounter Problems       Encounter Problems (Active)        Balance       LTG - Patient will maintain standing and sitting balance to allow for completion of daily activities (Progressing)       Start:  12/29/23               Compromised Skin Integrity       LTG - Patient will be free from infection (Progressing)       Start:  12/29/23            LTG - Patient will maintain/improve skin integrity through proper skin care techniques (Progressing)       Start:  12/29/23               Mobility       LTG - Patient will ambulate community distance (Progressing)       Start:  12/29/23            LTG - Patient will navigate 4-6 steps with rails/device (Progressing)       Start:  12/29/23               Pain          Safety       LTG - Patient will demonstrate safety requirements appropriate to situation/environment (Progressing)       Start:  12/29/23               Transfers       LTG - Patient will demonstrate safe transfer techniques (Progressing)       Start:  12/29/23                   Education Documentation  Handouts, taught by Sujatha Barba PT at 12/29/2023  5:22 PM.  Learner: Significant Other, Patient  Readiness: Acceptance  Method: Explanation, Demonstration, Handout  Response: Verbalizes Understanding, Demonstrated Understanding    Precautions, taught by Sujatha Barba PT at 12/29/2023  5:22 PM.  Learner: Significant Other, Patient  Readiness: Acceptance  Method: Explanation, Demonstration, Handout  Response: Verbalizes Understanding, Demonstrated Understanding    Body Mechanics, taught by Sujatha Barba PT at 12/29/2023  5:22 PM.  Learner: Significant Other, Patient  Readiness: Acceptance  Method: Explanation, Demonstration, Handout  Response: Verbalizes Understanding, Demonstrated Understanding    Home Exercise Program, taught by Sujatha Barba, PT at 12/29/2023  5:22 PM.  Learner: Significant Other, Patient  Readiness: Acceptance  Method: Explanation, Demonstration, Handout  Response: Verbalizes Understanding, Demonstrated Understanding    Mobility  Training, taught by Sujatha Barba, PT at 12/29/2023  5:22 PM.  Learner: Significant Other, Patient  Readiness: Acceptance  Method: Explanation, Demonstration, Handout  Response: Verbalizes Understanding, Demonstrated Understanding    Education Comments  No comments found.    Sujatha Barba, PT, DPT

## 2023-12-29 NOTE — PROGRESS NOTES
Pt is POD #0 knee.    RN TCC spoke with pt.  Introduction of self and explanation of dc planning role provided and pt agreed to proceed.   She anticipates home on 12/30 with [facility].  She verbalizes that she used them in the past and would like to use them with this post-op PT care.   Her spouse will provide transport home.     Secure chat message sent to surgeon requesting [facility] referral order.   Pt updated.

## 2023-12-29 NOTE — ANESTHESIA POSTPROCEDURE EVALUATION
Patient: Bree Bojorquez    Procedure Summary       Date: 12/29/23 Room / Location: [facility] OR 08 / Virtual [facility] OR    Anesthesia Start: 1155 Anesthesia Stop: 1358    Procedure: LEFT ROBOT ASSISTED LEFT TOTAL KNEE ARTHROPLASTY (Left: Knee) Diagnosis:       Bilateral primary osteoarthritis of knee      (Bilateral primary osteoarthritis of knee [M17.0])    Surgeons: Lisa Fuentes DO Responsible Provider: Weston Martinez MD    Anesthesia Type: general ASA Status: 2            Anesthesia Type: general    Vitals Value Taken Time   /72 12/29/23 1415   Temp 36.2 °C (97.2 °F) 12/29/23 1348   Pulse 89 12/29/23 1415   Resp 14 12/29/23 1415   SpO2 99 % 12/29/23 1415       Anesthesia Post Evaluation    Patient location during evaluation: PACU  Patient participation: complete - patient participated  Level of consciousness: awake  Pain management: adequate  Airway patency: patent  Cardiovascular status: acceptable  Respiratory status: acceptable  Hydration status: acceptable  Postoperative Nausea and Vomiting: none        No notable events documented.

## 2023-12-29 NOTE — CONSULTS
Consults    Reason For Consult  HTN, pre-diabetes     History Of Present Illness  Bree Bojorquez is a 58 y.o. female presenting with left knee pain. Presented for left knee total arthroplasty 12/29. Currently feels pain well controlled. Denies chest pain, shortness of breath, abdominal pain, fevers, chills, leg edema.  at bedside, questions answered.      Past Medical History  She has a past medical history of Adjustment disorder with depressed mood, Anxiety, Asthma, Basal cell carcinoma, Encounter for screening for malignant neoplasm of vagina, GERD (gastroesophageal reflux disease), Hyperlipidemia, Hypertension, Major depressive disorder, recurrent, moderate (CMS/HCC) (03/23/2016), Metabolic syndrome, Other specified disorders of nose and nasal sinuses (03/27/2020), Personal history of other diseases of the respiratory system, Personal history of other medical treatment, Personal history of other specified conditions (10/15/2019), Radiculopathy, cervical region (05/10/2021), Radiculopathy, cervicothoracic region (05/10/2021), and Unspecified abnormal findings in urine (10/15/2019).    Surgical History  She has a past surgical history that includes Knee surgery (01/10/2014); Partial hysterectomy; Salpingoophorectomy (Left); Knee Arthroplasty (Right); Cataract extraction (Bilateral); and Breast surgery.     Social History  She reports that she has never smoked. She has never used smokeless tobacco. She reports that she does not drink alcohol and does not use drugs.    Family History  Family History   Problem Relation Name Age of Onset    Depression Mother      Hyperlipidemia Mother      Hypertension Mother      Hyperthyroidism Mother      Stroke Mother      Diabetes type II Mother      Coronary artery disease Father      Other (coronary artery surgery) Father      Breast cancer Father's Sister          Allergies  Codeine and Sulfa (sulfonamide antibiotics)    Review of Systems   Constitutional: Negative.   "  HENT: Negative.     Eyes: Negative.    Respiratory: Negative.     Cardiovascular: Negative.    Gastrointestinal: Negative.    Endocrine: Negative.    Genitourinary: Negative.    Musculoskeletal:         Left knee pain      Skin: Negative.    Neurological: Negative.    Psychiatric/Behavioral: Negative.          Physical Exam  Constitutional:       Appearance: Normal appearance.   HENT:      Head: Normocephalic and atraumatic.      Mouth/Throat:      Mouth: Mucous membranes are moist.      Pharynx: Oropharynx is clear.   Eyes:      Extraocular Movements: Extraocular movements intact.      Conjunctiva/sclera: Conjunctivae normal.      Pupils: Pupils are equal, round, and reactive to light.   Cardiovascular:      Rate and Rhythm: Normal rate and regular rhythm.      Pulses: Normal pulses.      Heart sounds: Normal heart sounds.   Pulmonary:      Effort: Pulmonary effort is normal.      Breath sounds: Normal breath sounds.   Abdominal:      General: Bowel sounds are normal.      Palpations: Abdomen is soft.      Tenderness: There is no abdominal tenderness.   Musculoskeletal:         General: Normal range of motion.      Cervical back: Normal range of motion and neck supple.   Skin:     General: Skin is warm and dry.      Capillary Refill: Capillary refill takes less than 2 seconds.   Neurological:      General: No focal deficit present.      Mental Status: She is alert and oriented to person, place, and time.   Psychiatric:         Mood and Affect: Mood normal.         Behavior: Behavior normal.          Last Recorded Vitals  Blood pressure 136/74, pulse 86, temperature 36.3 °C (97.3 °F), temperature source Temporal, resp. rate 15, height 1.727 m (5' 8\"), weight 80.9 kg (178 lb 5.6 oz), SpO2 100 %.    Relevant Results    XR knee left 1-2 views    Result Date: 12/29/2023  Interpreted By:  Michael Lane, STUDY: XR KNEE LEFT 1-2 VIEWS; 12/29/2023 2:04 pm   INDICATION: Signs/Symptoms:Post-op knee, PACU;   COMPARISON: " 07/06/2020   ACCESSION NUMBER(S): [id number]   ORDERING CLINICIAN: LEEROY ESPINAL   TECHNIQUE: AP/lateral projection   FINDINGS: Left total knee arthroplasty is noted. Postop changes are seen.       Left total knee arthroplasty.   MACRO: None   Signed by: Michael Lane 12/29/2023 2:24 PM Dictation workstation:   [id number]        Assessment/Plan   Principal Problem:    Primary osteoarthritis of left knee   S/P left knee total arthroplasty 12/29   Pain management per primary service    DVT prophylaxis per primary service    PT/OT   Active Problems:    Benign essential hypertension   Continue lisinopril with hold parameters     Prediabetes   Hold metformin today    AC/HS glucose with SSI coverage     Hypoglycemia protocol     Mixed hyperlipidemia   Continue satin     Asthma   PRN Duonebs     GERD (gastroesophageal reflux disease)   Continue PPI

## 2023-12-29 NOTE — CARE PLAN
Problem: Balance  Goal: LTG - Patient will maintain standing and sitting balance to allow for completion of daily activities  Outcome: Progressing     Problem: Mobility  Goal: LTG - Patient will ambulate community distance  Outcome: Progressing  Goal: LTG - Patient will navigate 4-6 steps with rails/device  Outcome: Progressing     Problem: Safety  Goal: LTG - Patient will demonstrate safety requirements appropriate to situation/environment  Outcome: Progressing     Problem: Transfers  Goal: LTG - Patient will demonstrate safe transfer techniques  Outcome: Progressing     Problem: Pain  Goal: LTG - Patient will manage pain with the appropriate technique/Intervention  Outcome: Progressing     Problem: Compromised Skin Integrity  Goal: LTG - Patient will be free from infection  Outcome: Progressing  Goal: LTG - Patient will maintain/improve skin integrity through proper skin care techniques  Outcome: Progressing

## 2023-12-29 NOTE — OP NOTE
left TOTAL KNEE ARTHROPLASTY     Date: 2023   OR Location: [facility] OR    Name: Bree Bojorquez  : 1965    MRN: [id number]    Diagnosis  Primary Osteoarthritis, left knee    Procedures  LEFT ROBOT ASSISTED LEFT TOTAL KNEE ARTHROPLASTY  62887 - [place] ARTHRP KNE CONDYLE&PLATU MEDIAL&LAT COMPARTMENTS      Surgeons      * Lisa Fuentes - Primary     Specimen: none    Staff: Circulator: Suagr Tirado RN  Relief Circulator: Anahi Russo RN  Scrub Person: Hector Bynum; Darleen Germain RN; Kenney Weinstein     Drains and/or Catheters: none    Estimated Blood Loss: 50 cc    Resident/Fellow/Other Assistant:  Surgeon(s) and Role:     Procedure Summary  Anesthesia: General    Anesthesia Staff: Anesthesiologist: Weston Martinez MD  CRNA: BIANKA Lomax-CRNA   ASA: II     Tourniquet Time: * Missing tourniquet times found for documented tourniquets in lo *    Intra-op Medications:   - 1 Gram Tranexamic acid prior to surgical incision  - 1 Gram Tranexamic acid at start of incision close  - BRAYDON Pain cockail: ropivacaine-epinephrine-clonidine-ketorolac 2.46-0.005- 0.0008-0.3mg/mL periarticular syringe: 50 cc    IMPLANTS:   Implant Name Type Inv. Item Serial No.  Lot No. LRB No. Used Action   PATELLA, TRIATHLON, ASYMMETRIC, SIZE A29 - [id number] Joint PATELLA, TRIATHLON, ASYMMETRIC, SIZE A29  [employer]1S [id number] Left 1 Implanted   COMPONENT, CR FEMORAL, P3, BEADED W/PA, LEFT - [id number] Joint COMPONENT, CR FEMORAL, P3, BEADED W/PA, LEFT  [employer] [id number] Left 1 Implanted   INSERT, TIBIAL, X3 TRIATHLON CS, SZ-3 9MM - [id number] Joint INSERT, TIBIAL, X3 TRIATHLON CS, SZ-3 9MM  [employer] [id number] Left 1 Implanted   BASEPLATE, TRIATHLON TRITANIUM, SIZE 3, 44MM - [id number] Joint BASEPLATE, TRIATHLON TRITANIUM, SIZE 3, 44MM  [employer] [facility] [id number] Left 1 Implanted       Findings: See operative note    Operative  Indications:  The patient  is well-known to me and initially presented as an outpatient. They have been treated for advanced knee osteoarthritis with therapy, anti-inflammatories, and activity modification, all without improvement of symptoms. We therefore reviewed the alternative option of elective total knee arthroplasty. The risks and benefits of this procedure were discussed in detail as an outpatient and are documented in our outpatient record. The patient expressed full understanding and wished to proceed. Informed consent was obtained and was placed on the medical chart    The risks, benefits and potential complications of the arthroplasty surgery were discussed with the patient in detail.  Risks including but not limited to the risk of anesthesia, DVT, pulmonary embolism with the possibility of death, retained infection, and neurovascular injury.  Specific details of the procedure, hospitalization, recovery, rehabilitation, and long-term precautions were also provided. Pre-operative teaching was provided. Implant/prosthesis selection was outlined, and the many options available were explained; the final choice will be made at the time of the procedure to match the anatomy and condition of the bone, ligaments, tendons, and muscles. Understanding of all topics was conveyed to me by the patient, and consent was given to proceed with a total knee arthroplasty.     The use of robotic assisted surgery was discussed with the patient.  The risk, benefits were discussed regarding this.  Patient consented for this procedure.  We discussed specifically placement of pins and arrays for navigation during surgery and to help with the robotic assistance.  We discussed the use of an additional bandage to cover the pin sites.  We also reviewed that they may have some slight pain at the placement of pin sites that should improve in the same fashion as their general recovery of the joint replacement.    We discussed the term  robot, when used to describe the ABDI system, refers to the [employer] robotic arm. The ABDI System is not a robot, but a surgeon-controlled robotic-arm assisted device.    Procedure In Detail:  The patient was identified in the preoperative area .Their knee was then marked by myself and the patient agreed to proceed with the outlined procedure. Preoperative ABDI plan was reviewed with Abdi . They were transferred to the operating room and positioned supine on the OR table. Appropriate surgical huddle was performed with myself present. Anesthesia was then successfully induced. The operative lower extremity was then prepped and draped in the normal sterile fashion. A critical pause was taken and we identified the patient, the site of surgery, as well as the administration of preoperative IV antibiotics. The limb was then exsanguinated and tourniquet inflated to 100 mmHg above the systolic blood pressure.    With the knee in 90-degrees of flexion an anterior midline incision was made. A subvastus arthrotomy was then made and the knee was extended. A provisional soft tissue medial release was performed to the posterior medial corner. The patellar fat pad was excised and the patella was everted and controlled with two towel clips. The thickness was measured with a caliper and a freehand measured resection was then performed of the articular surface. The patella was then appropriately sized. Peg holes were drilled and a trial was inserted. Restoration of pre-resection thickness was noted. A protective plate was then applied to the patella and it was subluxed into the lateral gutter. The knee was flexed to 90-degrees and retractors were inserted. The ACL and lateral meniscus were released. Tibial and femoral checkpoints were placed for the ABDI system. Tibial and femoral arrays were placed with the utilization of 3.2mm pins in an intra-incisional fashion.     At this point, utilizing the ABDI system the hip  center was established. The medial and lateral malleoli were then established and tracker pin in tibia and femur was confirmed. The femur and tibia were tracked at this point utilizing the sharp blue probe. Secondary check was also performed with accuracy noted to be within under 1 mm. Once confirmed we moved to the planning stage.  Native alignment was captured at this point as well. Any osteophytes that were readily accessible were removed after this and prior to the balancing stage. At this point we turned to the balancing portion of the procedure. A manual stress was placed to capture poses at 10 degrees of extension to assess medial and lateral laxity of collateral ligaments. Following this the knee was brought into flexion and the medial and lateral compartments were stressed independently to assess laxity in flexion.     Once poses were captures we assessed the plan on the TAL system. Appropriate adjustments were made to the femur and tibia to allow restoration of alignments and a knee that was well balanced in flexion and extension. After we were satisfied with the reconstruction we turned to the cutting portion of the cases. Retractors were placed in a self-retaining fashion to protect the MCL and LCL. The TAL cutting arm was then brought into place. We performed the following sequence of cuts: Tibia, anterior femur, anterior chamfer, posterior femur. The blade was then switched to the down cutting blade and the distal femur and posterior chamfer cuts were made. Once this was done, cut bones were removed. Utilizing the  “x” distal femur cut guide we were able to restore limb axis to under one degree of the preoperative plan and our depth of resection to one millimeter of our preoperative plan. Satisfied with this the tal robot was then removed.     Lamina  was inserted into the joint and the remainder of menisci as well as any posterior osteophytes were excised. Pain cocktail was injected in  posterior capsule. At this point a tibial baseplate and a trial liner was placed in a “float” fashion. The knee was then flexed up and the femur was delivered into the wound and a trial femoral component was placed.  With the trials in the knee was then noted to come to full extension and flexed to greater than 120 degrees. The patella tracked centrally throughout the range of motion. Utilizing the [employer] software we used poly trial to allow for no more than 1.5 mm of laxity in the medial and lateral compartments throughout a range of motion. The knee was then brought into extension and the tibial rotation was marked with a bovie on the tibia to allow the rotation to match the shape of the femur.  At this point satisfied with our reconstruction trackers and pins from tibia and femur were removed.    The knee was then flexed back up and the lug holes were drilled for a pressfit femur. The femoral trial was then removed. The tibia was then exposed. Trial tibia was pinned in place in line with our peterson for the tibial tray rotation.  Tibial tray was noted to fit well without any overhang. Femoral and polyethylene trials were inserted and the knee was extended. Satisfied with this reconstruction the tibia was then broached and milled in appropriate fashion. All the trials were removed and the bony surfaces were lavaged with normal saline. Final components were then press-fit in place. They were noted to be fully seated and stable. The tourniquet was deflated and all active bleeding was cauterized. The knee was then thoroughly lavaged and the final tibial insert was placed. An anesthetic injection cocktail was infiltrated throughout the soft tissues. The arthrotomy was then repaired with #1 barbed suture. Subcutaneous tissues were closed in layers and finally with monocryl. Skin glue was applied.. Sterile dressing was applied and the patient was then transferred to the PACU in stable condition. All counts were correct at  the end of the case.     Complications:  None; patient tolerated the procedure well.    Disposition: PACU - hemodynamically stable.  Condition: stable      Plan:                         Weight Bearing Status:  WBAT Bilateral LE                        Range of Motion: As tolerated                        Valentine: none placed                        Dressing: Maintain for one week                        DVT Prophylaxis:  ASA 81mg BID x 4 weeks                         Antibiotics: Continue x24 hours miryam-operatively                         Discharge/Follow-up: Follow up in clinic in two weeks      Lisa Fuentes DO  Attending Surgeon  Joint Replacement and Adult Reconstructive Surgery  [place], [place]      Attending Attestation: I was present and scrubbed for the entire procedure.    This note was dictated using speech recognition software and was not corrected for spelling or grammatical errors

## 2023-12-29 NOTE — NURSING NOTE
Patient came upstairs from ed. Obtained patient's vitals, oriented patient to room, call light and phone within reach.

## 2023-12-29 NOTE — ANESTHESIA PROCEDURE NOTES
Peripheral Block    Patient location during procedure: pre-op  Start time: 12/29/2023 11:27 AM  End time: 12/29/2023 11:29 AM  Reason for block: at surgeon's request and post-op pain management  Staffing  Performed: attending   Authorized by: Weston Martinez MD    Performed by: Weston Martinez MD  Preanesthetic Checklist  Completed: patient identified, IV checked, site marked, risks and benefits discussed, surgical consent, monitors and equipment checked, pre-op evaluation and timeout performed   Timeout performed at: 12/29/2023 11:20 AM  Peripheral Block  Patient position: laying flat  Prep: ChloraPrep  Patient monitoring: heart rate, cardiac monitor and continuous pulse ox  Block type: adductor canal  Laterality: left  Injection technique: single-shot  Guidance: ultrasound guided  Local infiltration: ropivacaine  Infiltration strength: 0.5 %  Dose: 20 mL  Needle  Needle type: short-bevel   Needle gauge: 21 G  Needle length: 10 cm  Needle localization: ultrasound guidance  Assessment  Injection assessment: negative aspiration for heme, no paresthesia on injection, incremental injection and local visualized surrounding nerve on ultrasound  Paresthesia pain: none  Heart rate change: no  Slow fractionated injection: yes

## 2023-12-29 NOTE — ANESTHESIA PREPROCEDURE EVALUATION
Patient: Bree Bojorquez    Procedure Information       Date/Time: 12/29/23 1130    Procedure: LEFT ROBOT ASSISTED LEFT TOTAL KNEE ARTHROPLASTY (Left: Knee)    Location: [facility] OR 08 / [facility]    Surgeons: Lisa Fuentes, DO            Relevant Problems   Cardiovascular   (+) Benign essential hypertension   (+) Mixed hyperlipidemia      Neuro/Psych   (+) Anxiety, generalized      Musculoskeletal   (+) Osteoarthritis of left knee   (+) Primary osteoarthritis of both knees   (+) Primary osteoarthritis of left knee      Infectious Disease   (+) Bacterial folliculitis      Other   (+) Arthritis of knee, left   (+) Arthritis of knee, right       Clinical information reviewed:   Tobacco  Allergies  Meds   Med Hx  Surg Hx   Fam Hx  Soc Hx        NPO Detail:  No data recorded     PHYSICAL EXAM    Anesthesia Plan    ASA 2     general     The patient is not a current smoker.    intravenous induction   Postoperative administration of opioids is intended.  Trial extubation is planned.  Anesthetic plan and risks discussed with patient.  Use of blood products discussed with patient who.    Plan discussed with attending and CRNA.      
Attending Attestation (For Attendings USE Only)...

## 2023-12-29 NOTE — ANESTHESIA PROCEDURE NOTES
Peripheral Block    Patient location during procedure: pre-op  Start time: 12/29/2023 11:30 AM  End time: 12/29/2023 11:31 AM  Reason for block: at surgeon's request  Staffing  Performed: attending   Authorized by: Weston Martinez MD    Performed by: Weston Martinez MD  Preanesthetic Checklist  Completed: patient identified, IV checked, site marked, risks and benefits discussed, surgical consent, monitors and equipment checked, pre-op evaluation and timeout performed   Timeout performed at: 12/29/2023 11:20 AM  Peripheral Block  Patient position: laying flat  Prep: ChloraPrep  Patient monitoring: heart rate, cardiac monitor and continuous pulse ox  Block type: other  Injection technique: single-shot  Guidance: ultrasound guided  Local infiltration: ropivacaine  Infiltration strength: 0.5 %  Dose: 10 mL  Needle  Needle type: short-bevel   Needle gauge: 21 G  Needle length: 10 cm  Needle localization: ultrasound guidance  Assessment  Injection assessment: negative aspiration for heme, no paresthesia on injection, incremental injection and local visualized surrounding nerve on ultrasound  Paresthesia pain: none  Heart rate change: no  Slow fractionated injection: yes

## 2023-12-29 NOTE — ANESTHESIA PROCEDURE NOTES
Airway  Date/Time: 12/29/2023 12:14 PM  Urgency: elective    Airway not difficult    Staffing  Performed: CRNA   Authorized by: Weston Martinez MD    Performed by: BIANKA Lomax-KENYATTA  Patient location during procedure: OR    Indications and Patient Condition  Indications for airway management: anesthesia and airway protection  Spontaneous ventilation: present  Sedation level: deep  Preoxygenated: yes  Patient position: sniffing  MILS maintained throughout  Mask difficulty assessment: 0 - not attempted  No planned trial extubation    Final Airway Details  Final airway type: supraglottic airway      Successful airway: classic  Size 4     Number of attempts at approach: 1  Number of other approaches attempted: 0

## 2023-12-29 NOTE — NURSING NOTE
Assumed care of patient from PACU around 1530 she had a left knee surgery performed by Dr. Fuentes.  Patient is alert and oriented X 4.  Can makes her needs known. No c/o of pain at this time.  Patient has meplix, ace wrap and ice pack to left knee.  Oriented patient to room and call light system.

## 2023-12-29 NOTE — CARE PLAN
The patient's goals for the shift include remain safe.    The clinical goals for the shift include pain control.

## 2023-12-30 ENCOUNTER — DOCUMENTATION (OUTPATIENT)
Dept: HOME HEALTH SERVICES | Facility: HOME HEALTH | Age: 58
End: 2023-12-30
Payer: COMMERCIAL

## 2023-12-30 ENCOUNTER — HOME HEALTH ADMISSION (OUTPATIENT)
Dept: HOME HEALTH SERVICES | Facility: HOME HEALTH | Age: 58
End: 2023-12-30
Payer: COMMERCIAL

## 2023-12-30 VITALS
DIASTOLIC BLOOD PRESSURE: 78 MMHG | BODY MASS INDEX: 27.03 KG/M2 | HEIGHT: 68 IN | SYSTOLIC BLOOD PRESSURE: 134 MMHG | OXYGEN SATURATION: 99 % | HEART RATE: 74 BPM | TEMPERATURE: 97.5 F | WEIGHT: 178.35 LBS | RESPIRATION RATE: 18 BRPM

## 2023-12-30 PROBLEM — M17.12 PRIMARY OSTEOARTHRITIS OF LEFT KNEE: Status: RESOLVED | Noted: 2023-12-28 | Resolved: 2023-12-30

## 2023-12-30 LAB
ANION GAP SERPL CALC-SCNC: 11 MMOL/L (ref 10–20)
BASOPHILS # BLD AUTO: 0 X10*3/UL (ref 0–0.1)
BASOPHILS NFR BLD AUTO: 0 %
BUN SERPL-MCNC: 16 MG/DL (ref 6–23)
CALCIUM SERPL-MCNC: 8.9 MG/DL (ref 8.6–10.3)
CHLORIDE SERPL-SCNC: 105 MMOL/L (ref 98–107)
CO2 SERPL-SCNC: 25 MMOL/L (ref 21–32)
CREAT SERPL-MCNC: 0.68 MG/DL (ref 0.5–1.05)
EOSINOPHIL # BLD AUTO: 0.02 X10*3/UL (ref 0–0.7)
EOSINOPHIL NFR BLD AUTO: 0.2 %
ERYTHROCYTE [DISTWIDTH] IN BLOOD BY AUTOMATED COUNT: 11.7 % (ref 11.5–14.5)
GFR SERPL CREATININE-BSD FRML MDRD: >90 ML/MIN/1.73M*2
GLUCOSE BLD MANUAL STRIP-MCNC: 110 MG/DL (ref 74–99)
GLUCOSE SERPL-MCNC: 154 MG/DL (ref 74–99)
HCT VFR BLD AUTO: 32.4 % (ref 36–46)
HGB BLD-MCNC: 10.8 G/DL (ref 12–16)
IMM GRANULOCYTES # BLD AUTO: 0.01 X10*3/UL (ref 0–0.7)
IMM GRANULOCYTES NFR BLD AUTO: 0.1 % (ref 0–0.9)
LYMPHOCYTES # BLD AUTO: 0.62 X10*3/UL (ref 1.2–4.8)
LYMPHOCYTES NFR BLD AUTO: 5.4 %
MCH RBC QN AUTO: 29.3 PG (ref 26–34)
MCHC RBC AUTO-ENTMCNC: 33.3 G/DL (ref 32–36)
MCV RBC AUTO: 88 FL (ref 80–100)
MONOCYTES # BLD AUTO: 0.43 X10*3/UL (ref 0.1–1)
MONOCYTES NFR BLD AUTO: 3.7 %
NEUTROPHILS # BLD AUTO: 10.48 X10*3/UL (ref 1.2–7.7)
NEUTROPHILS NFR BLD AUTO: 90.6 %
NRBC BLD-RTO: ABNORMAL /100{WBCS}
PLATELET # BLD AUTO: 192 X10*3/UL (ref 150–450)
POTASSIUM SERPL-SCNC: 4 MMOL/L (ref 3.5–5.3)
RBC # BLD AUTO: 3.69 X10*6/UL (ref 4–5.2)
SODIUM SERPL-SCNC: 137 MMOL/L (ref 136–145)
WBC # BLD AUTO: 11.6 X10*3/UL (ref 4.4–11.3)

## 2023-12-30 PROCEDURE — 97116 GAIT TRAINING THERAPY: CPT | Mod: GP

## 2023-12-30 PROCEDURE — 96365 THER/PROPH/DIAG IV INF INIT: CPT

## 2023-12-30 PROCEDURE — 85025 COMPLETE CBC W/AUTO DIFF WBC: CPT | Performed by: STUDENT IN AN ORGANIZED HEALTH CARE EDUCATION/TRAINING PROGRAM

## 2023-12-30 PROCEDURE — 82947 ASSAY GLUCOSE BLOOD QUANT: CPT | Mod: 59

## 2023-12-30 PROCEDURE — 96375 TX/PRO/DX INJ NEW DRUG ADDON: CPT

## 2023-12-30 PROCEDURE — 2500000004 HC RX 250 GENERAL PHARMACY W/ HCPCS (ALT 636 FOR OP/ED): Performed by: EMERGENCY MEDICINE

## 2023-12-30 PROCEDURE — 97530 THERAPEUTIC ACTIVITIES: CPT | Mod: GP

## 2023-12-30 PROCEDURE — 36415 COLL VENOUS BLD VENIPUNCTURE: CPT | Performed by: STUDENT IN AN ORGANIZED HEALTH CARE EDUCATION/TRAINING PROGRAM

## 2023-12-30 PROCEDURE — 80048 BASIC METABOLIC PNL TOTAL CA: CPT | Performed by: STUDENT IN AN ORGANIZED HEALTH CARE EDUCATION/TRAINING PROGRAM

## 2023-12-30 PROCEDURE — 2500000001 HC RX 250 WO HCPCS SELF ADMINISTERED DRUGS (ALT 637 FOR MEDICARE OP): Performed by: EMERGENCY MEDICINE

## 2023-12-30 PROCEDURE — G0378 HOSPITAL OBSERVATION PER HR: HCPCS

## 2023-12-30 PROCEDURE — 96361 HYDRATE IV INFUSION ADD-ON: CPT

## 2023-12-30 PROCEDURE — 96376 TX/PRO/DX INJ SAME DRUG ADON: CPT

## 2023-12-30 PROCEDURE — 96366 THER/PROPH/DIAG IV INF ADDON: CPT

## 2023-12-30 RX ADMIN — KETOROLAC TROMETHAMINE 15 MG: 15 INJECTION, SOLUTION INTRAMUSCULAR; INTRAVENOUS at 12:13

## 2023-12-30 RX ADMIN — KETOROLAC TROMETHAMINE 15 MG: 15 INJECTION, SOLUTION INTRAMUSCULAR; INTRAVENOUS at 06:21

## 2023-12-30 RX ADMIN — ACETAMINOPHEN 650 MG: 325 TABLET ORAL at 12:13

## 2023-12-30 RX ADMIN — ACETAMINOPHEN 650 MG: 325 TABLET ORAL at 06:20

## 2023-12-30 RX ADMIN — SENNOSIDES 17.2 MG: 8.6 TABLET, FILM COATED ORAL at 08:30

## 2023-12-30 RX ADMIN — ACETAMINOPHEN 650 MG: 325 TABLET ORAL at 00:03

## 2023-12-30 RX ADMIN — PANTOPRAZOLE SODIUM 40 MG: 40 TABLET, DELAYED RELEASE ORAL at 06:20

## 2023-12-30 RX ADMIN — LISINOPRIL 10 MG: 10 TABLET ORAL at 08:31

## 2023-12-30 RX ADMIN — CEFAZOLIN SODIUM 2 G: 2 INJECTION, SOLUTION INTRAVENOUS at 01:59

## 2023-12-30 RX ADMIN — KETOROLAC TROMETHAMINE 15 MG: 15 INJECTION, SOLUTION INTRAMUSCULAR; INTRAVENOUS at 00:03

## 2023-12-30 RX ADMIN — ASPIRIN 81 MG: 81 TABLET, COATED ORAL at 08:30

## 2023-12-30 RX ADMIN — ESCITALOPRAM OXALATE 10 MG: 10 TABLET ORAL at 08:31

## 2023-12-30 ASSESSMENT — COGNITIVE AND FUNCTIONAL STATUS - GENERAL
HELP NEEDED FOR BATHING: A LITTLE
STANDING UP FROM CHAIR USING ARMS: A LITTLE
WALKING IN HOSPITAL ROOM: A LITTLE
TOILETING: A LITTLE
TURNING FROM BACK TO SIDE WHILE IN FLAT BAD: A LITTLE
DAILY ACTIVITIY SCORE: 24
DRESSING REGULAR LOWER BODY CLOTHING: A LITTLE
STANDING UP FROM CHAIR USING ARMS: A LITTLE
MOVING TO AND FROM BED TO CHAIR: A LITTLE
DAILY ACTIVITIY SCORE: 21
CLIMB 3 TO 5 STEPS WITH RAILING: A LITTLE
WALKING IN HOSPITAL ROOM: A LITTLE
TURNING FROM BACK TO SIDE WHILE IN FLAT BAD: A LITTLE
MOBILITY SCORE: 24
MOBILITY SCORE: 19
CLIMB 3 TO 5 STEPS WITH RAILING: A LITTLE
MOBILITY SCORE: 19
MOVING TO AND FROM BED TO CHAIR: A LITTLE

## 2023-12-30 ASSESSMENT — PAIN SCALES - GENERAL
PAINLEVEL_OUTOF10: 0 - NO PAIN
PAINLEVEL_OUTOF10: 3
PAINLEVEL_OUTOF10: 0 - NO PAIN
PAINLEVEL_OUTOF10: 3
PAINLEVEL_OUTOF10: 4
PAINLEVEL_OUTOF10: 0 - NO PAIN

## 2023-12-30 ASSESSMENT — PAIN DESCRIPTION - LOCATION
LOCATION: KNEE
LOCATION: KNEE

## 2023-12-30 ASSESSMENT — PAIN - FUNCTIONAL ASSESSMENT
PAIN_FUNCTIONAL_ASSESSMENT: FLACC (FACE, LEGS, ACTIVITY, CRY, CONSOLABILITY)
PAIN_FUNCTIONAL_ASSESSMENT: 0-10
PAIN_FUNCTIONAL_ASSESSMENT: FLACC (FACE, LEGS, ACTIVITY, CRY, CONSOLABILITY)
PAIN_FUNCTIONAL_ASSESSMENT: 0-10

## 2023-12-30 ASSESSMENT — PAIN DESCRIPTION - ORIENTATION
ORIENTATION: LEFT
ORIENTATION: LEFT

## 2023-12-30 NOTE — PROGRESS NOTES
"POST-OPERATIVE PROGRESS NOTE      ============================  IMPRESSION/PLAN:  ============================  58 y.o. female s/p left TKA completed on 12/29/2023     PLAN:  -Weightbearing: Weight bearing as tolerated  -DVT Prophylaxis: ASA 81mg BID x 4 weeks  -Radiology Studies: post operative xrays reviewed  -Continue PT/OT Eval  -Disposition: home with home care today  -Discharge Instructions in EMR  -Follow-up: three weeks        Bree Bojorquez seen and examined at bedside. Doing well, no issues overnight. Pain controlled with current treatment plan.     Review of Systems:   Constitutional: See HPI for pain assessment, No significant weight loss, recent trauma. Denies fevers/chills  Cardiovascular: No chest pain, shortness of breath  Respiratory: No difficulty breathing, cough  Gastrointestinal: No nausea, vomiting, diarrhea, constipation  Musculoskeletal: Noted in HPI, no arthralgias   Integumentary: No rashes, easy bruising, redness   Neurological: no numbness or tingling in extremities, no gait disturbances     Last Recorded Vitals  Blood pressure 134/78, pulse 74, temperature 36.4 °C (97.5 °F), temperature source Temporal, resp. rate 18, height 1.727 m (5' 8\"), weight 80.9 kg (178 lb 5.6 oz), SpO2 99 %.      Patient Active Problem List   Diagnosis    Anxiety, generalized    Arthritis of knee, right    Benign essential hypertension    Chronic pain of both knees    Elevated alkaline phosphatase level    Excessive sweating    Eyelash scantiness    Acquired pes planus of both feet    Hot flashes, menopausal    Overweight (BMI 25.0-29.9)    Prediabetes    Primary osteoarthritis of both knees    Rotator cuff tendinitis    Tear of left rotator cuff    Vitamin D deficiency    Actinic keratosis    Anterior cruciate ligament complete tear, right, subsequent encounter    Congenital deformities of feet    Insulin resistance    Mixed hyperlipidemia    Acute non-recurrent sinusitis    Allergic contact dermatitis due " to plants, except food    Hemangioma of skin and subcutaneous tissue    Other specified follicular disorders    Scar condition and fibrosis of skin    Hypertrophic scar    Lichen simplex chronicus    Melanocytic nevi of scalp and neck    Melanocytic nevi of trunk    Melanocytic nevi of unspecified lower limb, including hip    Melanocytic nevi of unspecified upper limb, including shoulder    Neoplasm of uncertain behavior of skin    Neoplasm of unspecified behavior of bone, soft tissue, and skin    Other melanin hyperpigmentation    Personal history of other malignant neoplasm of skin    Prurigo nodularis    Rosacea, unspecified    Squamous cell carcinoma of skin of other part of trunk    Other seborrheic keratosis    Skin changes due to chronic exposure to nonionizing radiation, unspecified    Palpitations    Scar    Bacterial folliculitis    Arthritis of knee, left    Tendinitis of left rotator cuff    Osteoarthritis of left knee    Rupture of anterior cruciate ligament of right knee    Impaired functional mobility and activity tolerance    S/P total knee arthroplasty, right    Asthma    GERD (gastroesophageal reflux disease)       =================================  EXAM  =================================  GENERAL: A/Ox3, NAD. Appears healthy, well nourished  CARDIAC: regular rate  LUNGS: Breathing non-labored    MUSCULOSKELETAL:  Laterality: left    - Incision: dressing in place with no saturation  - Palpation: appropriate post operative TTP along surgical incision  - Can actively straight leg raise  - compartments soft, negative homans    NEUROVASCULAR:  - Neurovascular Status: sensation intact to light touch distally  - Capillary refill brisk at extremities, Bilateral dorsalis pedis pulse 2+       Lisa Fuentes DO  Attending Surgeon  Joint Replacement and Adult Reconstructive Surgery  [place], [place]

## 2023-12-30 NOTE — NURSING NOTE
Assumed care of patient at 1900 and received report from day shift nurse. Patient being assisted up to restroom with PCA.  at bedside. Once she returned, she stated she has mild pain at a 3 on scale of 0-10 and she is comfortable with this at this time. She usually gets very ill with nausea and vomiting after any pain medications. She tolerated her earlier dose of tylenol and toradol but she also has a scopalamine patch on. She says if she needs oxy, she will need to take zofran at the same time. She was taking tramadol at home and tolerates this without any side effects. Otherwise, she is without complaints, she still has 2L NC on but denies and shortness of breath. All safety measures are in place, personal belongings within reach on bedside table, call light beside her in bed.

## 2023-12-30 NOTE — CARE PLAN
Problem: Pain  Goal: Takes deep breaths with improved pain control throughout the shift  Outcome: Progressing  Goal: Turns in bed with improved pain control throughout the shift  Outcome: Progressing  Goal: Walks with improved pain control throughout the shift  Outcome: Progressing  Goal: Performs ADL's with improved pain control throughout shift  Outcome: Progressing  Goal: Participates in PT with improved pain control throughout the shift  Outcome: Progressing  Goal: Free from opioid side effects throughout the shift  Outcome: Progressing  Goal: Free from acute confusion related to pain meds throughout the shift  Outcome: Progressing     Problem: Pain  Goal: LTG - Patient will manage pain with the appropriate technique/Intervention  Outcome: Progressing     Problem: Pain - Adult  Goal: Verbalizes/displays adequate comfort level or baseline comfort level  Outcome: Progressing     Problem: Safety - Adult  Goal: Free from fall injury  Outcome: Progressing     Problem: Discharge Planning  Goal: Discharge to home or other facility with appropriate resources  Outcome: Progressing     Problem: Chronic Conditions and Co-morbidities  Goal: Patient's chronic conditions and co-morbidity symptoms are monitored and maintained or improved  Outcome: Progressing     Problem: Risk for falls  Goal: I will remain free from falls  Outcome: Progressing     Problem: Respiratory  Goal: Minimal/no exertional discomfort or dyspnea this shift  Outcome: Progressing  Goal: No signs of respiratory distress (eg. Use of accessory muscles. Peds grunting)  Outcome: Progressing  Goal: Patent airway maintained this shift  Outcome: Progressing  Goal: Verbalize decreased shortness of breath this shift  Outcome: Progressing

## 2023-12-30 NOTE — CONSULTS
Consults    Reason For Consult  Medical consult requested for management of hypertension prediabetes hyperlipidemia history of asthma and GERD    History Of Present Illness  Bree Bojorquez is a 58 y.o. female presenting with status post left total knee replacement     Past Medical History  She has a past medical history of Adjustment disorder with depressed mood, Anxiety, Asthma, Basal cell carcinoma, Encounter for screening for malignant neoplasm of vagina, GERD (gastroesophageal reflux disease), Hyperlipidemia, Hypertension, Major depressive disorder, recurrent, moderate (CMS/HCC) (03/23/2016), Metabolic syndrome, Other specified disorders of nose and nasal sinuses (03/27/2020), Personal history of other diseases of the respiratory system, Personal history of other medical treatment, Personal history of other specified conditions (10/15/2019), Radiculopathy, cervical region (05/10/2021), Radiculopathy, cervicothoracic region (05/10/2021), and Unspecified abnormal findings in urine (10/15/2019).    Surgical History  She has a past surgical history that includes Knee surgery (01/10/2014); Partial hysterectomy; Salpingoophorectomy (Left); Knee Arthroplasty (Right); Cataract extraction (Bilateral); and Breast surgery.     Social History  She reports that she has never smoked. She has never used smokeless tobacco. She reports that she does not drink alcohol and does not use drugs.    Family History  Family History   Problem Relation Name Age of Onset    Depression Mother      Hyperlipidemia Mother      Hypertension Mother      Hyperthyroidism Mother      Stroke Mother      Diabetes type II Mother      Coronary artery disease Father      Other (coronary artery surgery) Father      Breast cancer Father's Sister          Allergies  Codeine and Sulfa (sulfonamide antibiotics)    Review of Systems  Noncontributory  Physical Exam  last Recorded Vitals  Blood pressure 134/78, pulse 74, temperature 36.4 °C (97.5 °F),  "temperature source Temporal, resp. rate 18, height 1.727 m (5' 8\"), weight 80.9 kg (178 lb 5.6 oz), SpO2 99 %.  Patient is alert in no acute distress  Lungs are clear to auscultation and percussion  Cardiac is regular rate and rhythm normal S1-S2 without murmur gallop rub click S3 or S4  Abdomen is benign  Extremities without cyanosis clubbing erythema or edema  Operative site exam per Ortho  Relevant Results  Scheduled medications  acetaminophen, 650 mg, oral, q6h JENNY  aspirin, 81 mg, oral, BID  atorvastatin, 20 mg, oral, Nightly  escitalopram, 10 mg, oral, Daily  insulin lispro, 0-5 Units, subcutaneous, Before meals & nightly  ketorolac, 15 mg, intravenous, q6h  lisinopril, 10 mg, oral, Daily  pantoprazole, 40 mg, oral, Daily before breakfast  scopolamine, 1 patch, transdermal, q72h  sennosides, 2 tablet, oral, BID      Continuous medications  lactated Ringer's, 100 mL/hr, Last Rate: Stopped (12/29/23 2001)      PRN medications  PRN medications: alum-mag hydroxide-simeth, benzocaine-menthol, bisacodyl, cyclobenzaprine, dextrose 10 % in water (D10W), dextrose, diphenhydrAMINE, diphenhydrAMINE, glucagon, HYDROcodone-acetaminophen, HYDROcodone-acetaminophen, ipratropium-albuteroL, lubricating eye drops, melatonin, metoclopramide **OR** metoclopramide, naloxone, naloxone, ondansetron ODT **OR** ondansetron, oxygen, simethicone, sodium chloride, traMADol  Results for orders placed or performed during the hospital encounter of 12/29/23 (from the past 24 hour(s))   POCT GLUCOSE   Result Value Ref Range    POCT Glucose 141 (H) 74 - 99 mg/dL   POCT GLUCOSE   Result Value Ref Range    POCT Glucose 310 (H) 74 - 99 mg/dL   Basic Metabolic Panel   Result Value Ref Range    Glucose 154 (H) 74 - 99 mg/dL    Sodium 137 136 - 145 mmol/L    Potassium 4.0 3.5 - 5.3 mmol/L    Chloride 105 98 - 107 mmol/L    Bicarbonate 25 21 - 32 mmol/L    Anion Gap 11 10 - 20 mmol/L    Urea Nitrogen 16 6 - 23 mg/dL    Creatinine 0.68 0.50 - 1.05 " mg/dL    eGFR >90 >60 mL/min/1.73m*2    Calcium 8.9 8.6 - 10.3 mg/dL   CBC and Auto Differential   Result Value Ref Range    WBC 11.6 (H) 4.4 - 11.3 x10*3/uL    nRBC      RBC 3.69 (L) 4.00 - 5.20 x10*6/uL    Hemoglobin 10.8 (L) 12.0 - 16.0 g/dL    Hematocrit 32.4 (L) 36.0 - 46.0 %    MCV 88 80 - 100 fL    MCH 29.3 26.0 - 34.0 pg    MCHC 33.3 32.0 - 36.0 g/dL    RDW 11.7 11.5 - 14.5 %    Platelets 192 150 - 450 x10*3/uL    Neutrophils % 90.6 40.0 - 80.0 %    Immature Granulocytes %, Automated 0.1 0.0 - 0.9 %    Lymphocytes % 5.4 13.0 - 44.0 %    Monocytes % 3.7 2.0 - 10.0 %    Eosinophils % 0.2 0.0 - 6.0 %    Basophils % 0.0 0.0 - 2.0 %    Neutrophils Absolute 10.48 (H) 1.20 - 7.70 x10*3/uL    Immature Granulocytes Absolute, Automated 0.01 0.00 - 0.70 x10*3/uL    Lymphocytes Absolute 0.62 (L) 1.20 - 4.80 x10*3/uL    Monocytes Absolute 0.43 0.10 - 1.00 x10*3/uL    Eosinophils Absolute 0.02 0.00 - 0.70 x10*3/uL    Basophils Absolute 0.00 0.00 - 0.10 x10*3/uL   POCT GLUCOSE   Result Value Ref Range    POCT Glucose 110 (H) 74 - 99 mg/dL         Assessment/Plan      Primary osteoarthritis of left knee              S/P left knee total arthroplasty 12/29              Pain management per primary service               DVT prophylaxis per primary service               PT/OT   Active Problems:    Benign essential hypertension              Continue lisinopril with hold parameters     Prediabetes              Hold metformin today               AC/HS glucose with SSI coverage                     Hypoglycemia protocol     Mixed hyperlipidemia              Continue satin     Asthma              PRN Duonebs     GERD (gastroesophageal reflux disease)              Continue PPI   Clinically stable for discharge to home    I spent 35 minutes in the professional and overall care of this patient.

## 2023-12-30 NOTE — PROGRESS NOTES
Patient seen, chart reviewed.  Reports that she is starting to have some posterior knee pain, but it is well-controlled currently.  She does not tolerate opioids well, and is asking for tramadol instead of Norco.  This has now been ordered.  No other acute complaints.  Vital signs are stable.      Adela Ray MD

## 2023-12-30 NOTE — NURSING NOTE
1225pm Pt given written and verbal discharge instructions with verbalized understanding. Pt's iv heplock dc'd without difficulty pt escorted down via w/c for dc home with all personal belongings accompanying with .

## 2023-12-30 NOTE — NURSING NOTE
0822am Assumed care of pt, A/Ox 4 denies any c/o pains no distress noted.Pt's left knee dressing dry and intact with ice man in place. Pt awake up in bed with call light in reach.

## 2023-12-30 NOTE — PROGRESS NOTES
Pt is POD #1 knee.    Plan is discharge home with [facility].   RN TCC sent secure chat message to [facility]/Manasa Rashid to confirm referral acceptance and SOC.

## 2023-12-30 NOTE — HH CARE COORDINATION
Home Care received a Referral for Physical Therapy and Occupational Therapy. We have processed the referral for a Start of Care on 12-31-23.     If you have any questions or concerns, please feel free to contact us at [phone]. Follow the prompts, enter your five digit zip code, and you will be directed to your care team on [facility] 2.

## 2023-12-30 NOTE — PROGRESS NOTES
[facility] confirmed by Faustino.    SOC 12/31.    RN TCC provided update to RN and pt.    Pts spouse will provide transport home.

## 2023-12-30 NOTE — PROGRESS NOTES
Physical Therapy    Physical Therapy Treatment    Patient Name: Bree Bojorquez  MRN: [id number]  Today's Date: 12/30/2023  Time Calculation  Start Time: 1012  Stop Time: 1035  Time Calculation (min): 23 min       Assessment/Plan   PT Assessment  PT Assessment Results: Decreased range of motion, Decreased mobility, Pain, Orthopedic restrictions  Rehab Prognosis: Excellent  Evaluation/Treatment Tolerance: Patient tolerated treatment well  Medical Staff Made Aware: Yes  Strengths: Access to adaptive/assistive products, Ability to acquire knowledge, Attitude of self, Capable of completing ADLs semi/independent, Coping skills, Insight into problems, Support of Caregivers  Barriers to Participation: Comorbidities  End of Session Communication: Bedside nurse  Assessment Comment: Pt progressing towards goals and is improving with mobility assistance and tolerance to therapy but continues to have increased L knee pain. Pt understanding of HEP and L knee precautions; does well maintaining them too. PT recommends [facility] PT with assist as needed and tolerating well.  End of Session Patient Position: Up in chair, Alarm off, caregiver present  PT Plan  Inpatient/Swing Bed or Outpatient: Inpatient  PT Plan  Treatment/Interventions: Gait training, Stair training, Range of motion, Home exercise program, Positioning  PT Plan: Skilled PT  PT Frequency: BID  PT Discharge Recommendations: Low intensity level of continued care  Equipment Recommended upon Discharge: Wheeled walker, Straight cane  PT Recommended Transfer Status: Assistive device, Stand by assist  PT - OK to Discharge: Yes    Time spent prior to session chart reviewing and addressing RN clearance and care coordination DC status.   General Visit Information:   PT  Visit  PT Received On: 12/30/23  Response to Previous Treatment: Patient with no complaints from previous session.  General  Reason for Referral: L TKA  Referred By: Dr. reed  Past Medical History Relevant to  Rehab: depression/anxiety, basal cell carcinoma, gerd, HLD, HTN, R TKA, metabolic syndrome, breast surgery after abdmornal mammogram, salpingoopherectomy  Family/Caregiver Present: Yes  Prior to Session Communication: Bedside nurse  Patient Position Received: Bed, 3 rail up  General Comment: cleared by RN and agreeable to session. pleasantly motivated    Subjective   Precautions:  Precautions  LE Weight Bearing Status: Weight Bearing as Tolerated  Post-Surgical Precautions: Left total knee precautions  Vital Signs:  Vital Signs  SpO2:  (on 2 L O2)    Objective   Pain:  Pain Assessment  Pain Assessment: 0-10  Pain Score: 4  Pain Type: Surgical pain  Pain Location: Knee  Pain Orientation: Left  Pain Descriptors: Aching  Pain Interventions: Cold applied, Rest, Repositioned  Response to Interventions: continued therapy  Cognition:  Cognition  Overall Cognitive Status: Within Functional Limits  Attention: Within Functional Limits  Memory: Within Funtional Limits  Problem Solving: Within Functional Limits  Numeric Reasoning: Within Functional Limits  Abstract Reasoning: Within Functional Limits  Safety/Judgement: Within Functional Limits  Insight: Within function limits  Impulsive: Within functional limits  Processing Speed: Within funtional limits  Postural Control:  Postural Control  Postural Control: Within Functional Limits  Extremity/Trunk Assessments:  RUE   RUE : Within Functional Limits  LUE   LUE: Within Functional Limits  RLE   RLE : Within Functional Limits  LLE   LLE : Within Functional Limits  Activity Tolerance:     Treatments:  Therapeutic Exercise  Therapeutic Exercise Performed: No  Therapeutic Exercise Activity 1: ankle pumps x10  Therapeutic Exercise Activity 2: quad sets x7  Therapeutic Exercise Activity 3: glute sets x10  Therapeutic Exercise Activity 4: heel slides x10 with assist  Therapeutic Exercise Activity 5: hip abductionx5  Therapeutic Exercise Activity 6: SAQ x7  Therapeutic Exercise Activity  7: SLR x5    Therapeutic Activity  Therapeutic Activity Performed: Yes  Therapeutic Activity 1: pt dressed BLE and BUE without assistance and able to stand with RW present but no UE support. no buckle or LOB.    Bed Mobility  Bed Mobility: Yes  Bed Mobility 1  Bed Mobility 1: Supine to sitting, Scooting  Level of Assistance 1: Independent  Bed Mobility Comments 1: transferred to EOB without issues    Ambulation/Gait Training  Ambulation/Gait Training Performed: Yes  Ambulation/Gait Training 1  Device 1: Rolling walker  Assistance 1: Distant supervision  Quality of Gait 1: Decreased step length, Soft knee(s)  Comments/Distance (ft) 1: pt ambulated 150 feetx2 with RW at DSx1 displaying slower brissa for intial ambulation but improved with prolonged, antalgic gait without buckle or LOB. soft L knee likely from pain but tolerating well.  Transfers  Transfer: Yes  Transfer 1  Technique 1: Sit to stand, Stand to sit  Transfer Device 1: Walker  Transfer Level of Assistance 1: Modified independent  Trials/Comments 1: transferred from bed to standing as mod I with use of RW and good handplacement. no buckle or LOB.    Stairs  Stairs: Yes  Stairs  Rails 1: Left  Device 1: Railing, Single point cane  Assistance 1: Distant supervision  Comment/Number of Steps 1: completed 3 steps with 1 railing and cane with step to pattern, able to talk through stepping pattern needed without cueing. Pt tolerated well. slower tocomplete but no issues.    Outcome Measures:  [facility] Basic Mobility  Turning from your back to your side while in a flat bed without using bedrails: None  Moving from lying on your back to sitting on the side of a flat bed without using bedrails: None  Moving to and from bed to chair (including a wheelchair): None  Standing up from a chair using your arms (e.g. wheelchair or bedside chair): None  To walk in hospital room: None  Climbing 3-5 steps with railing: None  Basic Mobility - Total Score: 24    Education  Documentation  No documentation found.  Education Comments  No comments found.        OP EDUCATION:  Outpatient Education  Individual(s) Educated: Patient, Spouse  Education Provided: Body Mechanics, Fall Risk, Home Exercise Program, POC, Post-Op Precautions  Equipment: Ice Pack  Risk and Benefits Discussed with Patient/Caregiver/Other: yes  Patient/Caregiver Demonstrated Understanding: yes  Plan of Care Discussed and Agreed Upon: yes  Patient Response to Education: Patient/Caregiver Verbalized Understanding of Information, Patient/Caregiver Performed Return Demonstration of Exercises/Activities, Patient/Caregiver Asked Appropriate Questions    Encounter Problems       Encounter Problems (Active)       Balance       LTG - Patient will maintain standing and sitting balance to allow for completion of daily activities (Progressing)       Start:  12/29/23    Expected End:  12/30/23               Compromised Skin Integrity       LTG - Patient will be free from infection (Progressing)       Start:  12/29/23    Expected End:  12/30/23            LTG - Patient will maintain/improve skin integrity through proper skin care techniques (Progressing)       Start:  12/29/23    Expected End:  12/30/23               Mobility       LTG - Patient will ambulate community distance (Progressing)       Start:  12/29/23    Expected End:  12/30/23            LTG - Patient will navigate 4-6 steps with rails/device (Progressing)       Start:  12/29/23    Expected End:  12/30/23               Pain          Pain - Adult          Safety       LTG - Patient will demonstrate safety requirements appropriate to situation/environment (Progressing)       Start:  12/29/23    Expected End:  12/30/23               Transfers       LTG - Patient will demonstrate safe transfer techniques (Progressing)       Start:  12/29/23    Expected End:  12/30/23               Pt left in recliner with ice on L LOWER EXTREMITY, call bell in reach, all needs met. RN  notified of status    Sujatha Barba, PT, DPT

## 2023-12-30 NOTE — PROGRESS NOTES
Medication Education     Medication education for Bree Bojorquez was provided to the patient  for the following medication(s):  Tramadol  Aspirin  Flexeril  Doxycycline  Zofran  Pantoprazole  Senna    Medication education provided by a Pharmacist:  -Proper dose, indication, possible ADRs   -Refilling the medication    -How the medication works and benefits of taking it  -Importance of compliance   -Potential duration of therapy    Identified potential barriers to education:  None    Method(s) of Education:  Verbal Written materials provided and reviewed    An opportunity to ask questions and receive answers was provided.     Assessment of understanding the patient :  2= meets goals/outcomes    Additional Notes (if applicable): Meds to beds given to patient. Patient wishes to continue her home meloxicam instead of taking the Celebrex. Patient mentioned she discussed this with provider, who was in agreement with this plan.    Malia Grover, DavonD

## 2023-12-31 ENCOUNTER — HOME CARE VISIT (OUTPATIENT)
Dept: HOME HEALTH SERVICES | Facility: HOME HEALTH | Age: 58
End: 2023-12-31
Payer: COMMERCIAL

## 2023-12-31 VITALS
TEMPERATURE: 98.5 F | HEART RATE: 93 BPM | RESPIRATION RATE: 16 BRPM | DIASTOLIC BLOOD PRESSURE: 68 MMHG | SYSTOLIC BLOOD PRESSURE: 140 MMHG | OXYGEN SATURATION: 95 %

## 2023-12-31 PROCEDURE — G0151 HHCP-SERV OF PT,EA 15 MIN: HCPCS

## 2023-12-31 PROCEDURE — 0023 HH SOC

## 2023-12-31 SDOH — HEALTH STABILITY: PHYSICAL HEALTH
EXERCISE COMMENTS: SUPINE ANKLE PUMPS, QUAD SETS, HEEL SLIDES WITH ASSIST ONLY 10 REPS. INSTRUCTED PATIENT TO TRY AND DO HEEL SLIDES AND USE A BELT

## 2023-12-31 ASSESSMENT — ENCOUNTER SYMPTOMS
LOWEST PAIN SEVERITY IN PAST 24 HOURS: 4/10
SUBJECTIVE PAIN PROGRESSION: UNCHANGED
PAIN: 1
PAIN LOCATION: LEFT KNEE
PERSON REPORTING PAIN: PATIENT
OCCASIONAL FEELINGS OF UNSTEADINESS: 0
LIMITED RANGE OF MOTION: 1
MUSCLE WEAKNESS: 1
PAIN SEVERITY GOAL: 1/10
HIGHEST PAIN SEVERITY IN PAST 24 HOURS: 10/10

## 2023-12-31 ASSESSMENT — ACTIVITIES OF DAILY LIVING (ADL)
AMBULATION ASSISTANCE ON FLAT SURFACES: 1
AMBULATION ASSISTANCE: 1
AMBULATION ASSISTANCE: ONE PERSON
OASIS_M1830: 05
ENTERING_EXITING_HOME: MINIMUM ASSIST
AMBULATION_DISTANCE/DURATION_TOLERATED: 30

## 2023-12-31 NOTE — HOME HEALTH
Patient is an 58 yr old female who was at [facility] 12-29 to 12-30-23 for L TKR robotic assisted on 12-29 by Dr. Fuentes. PMH R TKR 9-15-23, GERD, HTN, HLD, prediabetic. PFS Patient lives in 2 story home with  who recently has his hip replaced. Patient was ambulating without device indep, indep with ADLs, driving, cooking, laundry and going to outpatient therapy for R TKR. Follow up with Dr. Fuentes 1-25-24 at 2:15. Patient to call and set up outpatient for when homecare is done. Dressing to be removed in 7 days. Patient declined OT. PT 3w1, 2w2.

## 2024-01-01 ENCOUNTER — HOME CARE VISIT (OUTPATIENT)
Dept: HOME HEALTH SERVICES | Facility: HOME HEALTH | Age: 59
End: 2024-01-01
Payer: COMMERCIAL

## 2024-01-02 ENCOUNTER — HOME CARE VISIT (OUTPATIENT)
Dept: HOME HEALTH SERVICES | Facility: HOME HEALTH | Age: 59
End: 2024-01-02
Payer: COMMERCIAL

## 2024-01-02 PROCEDURE — G0157 HHC PT ASSISTANT EA 15: HCPCS

## 2024-01-02 SDOH — HEALTH STABILITY: PHYSICAL HEALTH: PHYSICAL EXERCISE: SUPINE

## 2024-01-02 SDOH — HEALTH STABILITY: PHYSICAL HEALTH: EXERCISE ACTIVITY: L LE

## 2024-01-02 SDOH — HEALTH STABILITY: PHYSICAL HEALTH: EXERCISE TYPE: STRENGTHENING L LE

## 2024-01-02 SDOH — HEALTH STABILITY: PHYSICAL HEALTH: EXERCISE ACTIVITY: STRENGTHENING L LE

## 2024-01-02 SDOH — HEALTH STABILITY: PHYSICAL HEALTH: EXERCISE ACTIVITIES SETS: 1

## 2024-01-02 SDOH — HEALTH STABILITY: PHYSICAL HEALTH: PHYSICAL EXERCISE: 10

## 2024-01-02 SDOH — HEALTH STABILITY: PHYSICAL HEALTH: PHYSICAL EXERCISE: SEATED

## 2024-01-02 ASSESSMENT — ACTIVITIES OF DAILY LIVING (ADL)
AMBULATION_DISTANCE/DURATION_TOLERATED: 40 FT X 2
AMBULATION ASSISTANCE ON FLAT SURFACES: 1

## 2024-01-04 ENCOUNTER — TELEPHONE (OUTPATIENT)
Dept: ORTHOPEDIC SURGERY | Facility: CLINIC | Age: 59
End: 2024-01-04
Payer: COMMERCIAL

## 2024-01-04 DIAGNOSIS — F41.1 ANXIETY, GENERALIZED: ICD-10-CM

## 2024-01-04 DIAGNOSIS — E78.2 MIXED HYPERLIPIDEMIA: ICD-10-CM

## 2024-01-04 DIAGNOSIS — R73.03 PREDIABETES: ICD-10-CM

## 2024-01-04 DIAGNOSIS — M17.12 PRIMARY OSTEOARTHRITIS OF LEFT KNEE: ICD-10-CM

## 2024-01-04 RX ORDER — CYCLOBENZAPRINE HCL 5 MG
5 TABLET ORAL 3 TIMES DAILY PRN
Qty: 30 TABLET | Refills: 0 | Status: SHIPPED | OUTPATIENT
Start: 2024-01-04 | End: 2024-01-14

## 2024-01-04 RX ORDER — TRAMADOL HYDROCHLORIDE 50 MG/1
50 TABLET ORAL EVERY 6 HOURS PRN
Qty: 28 TABLET | Refills: 0 | Status: SHIPPED | OUTPATIENT
Start: 2024-01-04 | End: 2024-01-18 | Stop reason: SDUPTHER

## 2024-01-04 NOTE — TELEPHONE ENCOUNTER
Refill request for Tramadol and Flexeril to the [facility] in [place] which is in the chart.  She is S/P Left TKA 12/29/24.

## 2024-01-05 ENCOUNTER — HOME CARE VISIT (OUTPATIENT)
Dept: HOME HEALTH SERVICES | Facility: HOME HEALTH | Age: 59
End: 2024-01-05
Payer: COMMERCIAL

## 2024-01-05 PROCEDURE — G0157 HHC PT ASSISTANT EA 15: HCPCS

## 2024-01-05 RX ORDER — ATORVASTATIN CALCIUM 20 MG/1
20 TABLET, FILM COATED ORAL DAILY
Qty: 90 TABLET | Refills: 0 | Status: SHIPPED | OUTPATIENT
Start: 2024-01-05 | End: 2024-03-08 | Stop reason: SDUPTHER

## 2024-01-05 RX ORDER — ESCITALOPRAM OXALATE 10 MG/1
10 TABLET ORAL DAILY
Qty: 90 TABLET | Refills: 0 | Status: SHIPPED | OUTPATIENT
Start: 2024-01-05 | End: 2024-03-08 | Stop reason: SDUPTHER

## 2024-01-05 RX ORDER — METFORMIN HYDROCHLORIDE 500 MG/1
1000 TABLET, EXTENDED RELEASE ORAL
Qty: 180 TABLET | Refills: 0 | Status: SHIPPED | OUTPATIENT
Start: 2024-01-05 | End: 2024-03-08 | Stop reason: SDUPTHER

## 2024-01-05 SDOH — HEALTH STABILITY: PHYSICAL HEALTH: EXERCISE ACTIVITY: L LE

## 2024-01-05 SDOH — HEALTH STABILITY: PHYSICAL HEALTH: EXERCISE TYPE: STRENGTHENING L LE

## 2024-01-05 SDOH — HEALTH STABILITY: PHYSICAL HEALTH: PHYSICAL EXERCISE: SEATED

## 2024-01-05 SDOH — HEALTH STABILITY: PHYSICAL HEALTH: EXERCISE ACTIVITIES SETS: 1

## 2024-01-05 SDOH — HEALTH STABILITY: PHYSICAL HEALTH: PHYSICAL EXERCISE: 10

## 2024-01-05 SDOH — HEALTH STABILITY: PHYSICAL HEALTH: PHYSICAL EXERCISE: SUPINE

## 2024-01-05 SDOH — HEALTH STABILITY: PHYSICAL HEALTH: EXERCISE ACTIVITY: STRENGTHENING L LE

## 2024-01-05 ASSESSMENT — ACTIVITIES OF DAILY LIVING (ADL)
AMBULATION ASSISTANCE ON FLAT SURFACES: 1
AMBULATION_DISTANCE/DURATION_TOLERATED: 60 FT X 2

## 2024-01-09 ENCOUNTER — HOME CARE VISIT (OUTPATIENT)
Dept: HOME HEALTH SERVICES | Facility: HOME HEALTH | Age: 59
End: 2024-01-09
Payer: COMMERCIAL

## 2024-01-09 PROCEDURE — G0157 HHC PT ASSISTANT EA 15: HCPCS

## 2024-01-09 SDOH — HEALTH STABILITY: PHYSICAL HEALTH: PHYSICAL EXERCISE: 10

## 2024-01-09 SDOH — HEALTH STABILITY: PHYSICAL HEALTH: PHYSICAL EXERCISE: STANDING

## 2024-01-09 SDOH — HEALTH STABILITY: PHYSICAL HEALTH: EXERCISE ACTIVITY: STRENGTHENING L LE

## 2024-01-09 SDOH — HEALTH STABILITY: PHYSICAL HEALTH: EXERCISE ACTIVITY: L LE

## 2024-01-09 SDOH — HEALTH STABILITY: PHYSICAL HEALTH: EXERCISE ACTIVITIES SETS: 1

## 2024-01-09 SDOH — HEALTH STABILITY: PHYSICAL HEALTH: EXERCISE ACTIVITIES SETS: 2

## 2024-01-09 SDOH — HEALTH STABILITY: PHYSICAL HEALTH: EXERCISE TYPE: STRENGTHENING EX

## 2024-01-09 SDOH — HEALTH STABILITY: PHYSICAL HEALTH: PHYSICAL EXERCISE: SEATED

## 2024-01-09 SDOH — HEALTH STABILITY: PHYSICAL HEALTH: PHYSICAL EXERCISE: SUPINE

## 2024-01-09 ASSESSMENT — ACTIVITIES OF DAILY LIVING (ADL)
AMBULATION_DISTANCE/DURATION_TOLERATED: 75 FT X 2
AMBULATION ASSISTANCE ON FLAT SURFACES: 1

## 2024-01-11 ENCOUNTER — HOME CARE VISIT (OUTPATIENT)
Dept: HOME HEALTH SERVICES | Facility: HOME HEALTH | Age: 59
End: 2024-01-11
Payer: COMMERCIAL

## 2024-01-11 PROCEDURE — G0157 HHC PT ASSISTANT EA 15: HCPCS

## 2024-01-11 SDOH — HEALTH STABILITY: PHYSICAL HEALTH: PHYSICAL EXERCISE: SEATED

## 2024-01-11 SDOH — HEALTH STABILITY: PHYSICAL HEALTH: EXERCISE ACTIVITIES SETS: 2

## 2024-01-11 SDOH — HEALTH STABILITY: PHYSICAL HEALTH: PHYSICAL EXERCISE: 10

## 2024-01-11 SDOH — HEALTH STABILITY: PHYSICAL HEALTH: EXERCISE ACTIVITY: L LE

## 2024-01-11 SDOH — HEALTH STABILITY: PHYSICAL HEALTH: EXERCISE TYPE: STRENGTHENING L LE

## 2024-01-11 SDOH — HEALTH STABILITY: PHYSICAL HEALTH: PHYSICAL EXERCISE: STANDING

## 2024-01-11 SDOH — HEALTH STABILITY: PHYSICAL HEALTH: EXERCISE ACTIVITY: STRENGTHENING L LE

## 2024-01-11 SDOH — HEALTH STABILITY: PHYSICAL HEALTH: PHYSICAL EXERCISE: SUPINE

## 2024-01-11 ASSESSMENT — ACTIVITIES OF DAILY LIVING (ADL)
AMBULATION ASSISTANCE ON FLAT SURFACES: 1
AMBULATION_DISTANCE/DURATION_TOLERATED: 40 FT X 2

## 2024-01-16 ENCOUNTER — HOME CARE VISIT (OUTPATIENT)
Dept: HOME HEALTH SERVICES | Facility: HOME HEALTH | Age: 59
End: 2024-01-16
Payer: COMMERCIAL

## 2024-01-16 PROCEDURE — G0157 HHC PT ASSISTANT EA 15: HCPCS

## 2024-01-16 SDOH — HEALTH STABILITY: PHYSICAL HEALTH: PHYSICAL EXERCISE: 10

## 2024-01-16 SDOH — HEALTH STABILITY: PHYSICAL HEALTH: EXERCISE ACTIVITY: L LE

## 2024-01-16 SDOH — HEALTH STABILITY: PHYSICAL HEALTH: PHYSICAL EXERCISE: STANDING

## 2024-01-16 SDOH — HEALTH STABILITY: PHYSICAL HEALTH: PHYSICAL EXERCISE: SUPINE

## 2024-01-16 SDOH — HEALTH STABILITY: PHYSICAL HEALTH: EXERCISE ACTIVITIES SETS: 2

## 2024-01-16 SDOH — HEALTH STABILITY: PHYSICAL HEALTH: EXERCISE TYPE: STRENGTHENING EX  L LE

## 2024-01-16 SDOH — HEALTH STABILITY: PHYSICAL HEALTH: EXERCISE ACTIVITY: STRENGTHENING L LE

## 2024-01-16 SDOH — HEALTH STABILITY: PHYSICAL HEALTH: PHYSICAL EXERCISE: SEATED

## 2024-01-16 ASSESSMENT — ACTIVITIES OF DAILY LIVING (ADL)
AMBULATION_DISTANCE/DURATION_TOLERATED: 100 FT X 2
AMBULATION ASSISTANCE ON FLAT SURFACES: 1

## 2024-01-17 ENCOUNTER — HOME CARE VISIT (OUTPATIENT)
Dept: HOME HEALTH SERVICES | Facility: HOME HEALTH | Age: 59
End: 2024-01-17
Payer: COMMERCIAL

## 2024-01-17 PROCEDURE — G0151 HHCP-SERV OF PT,EA 15 MIN: HCPCS

## 2024-01-17 SDOH — HEALTH STABILITY: PHYSICAL HEALTH: EXERCISE TYPE: TKR SUPINE AND SITTING EXS TO IMPROVE ROM AND MM STRENGTH OF LLE

## 2024-01-17 ASSESSMENT — ACTIVITIES OF DAILY LIVING (ADL)
AMBULATION ASSISTANCE: 1
PHYSICAL TRANSFERS ASSESSED: 1
OASIS_M1830: 01
HOME_HEALTH_OASIS: 00

## 2024-01-17 ASSESSMENT — ENCOUNTER SYMPTOMS
PAIN LOCATION: LEFT KNEE
LOWEST PAIN SEVERITY IN PAST 24 HOURS: 3/10
HIGHEST PAIN SEVERITY IN PAST 24 HOURS: 8/10
SUBJECTIVE PAIN PROGRESSION: GRADUALLY IMPROVING
OCCASIONAL FEELINGS OF UNSTEADINESS: 0
LOSS OF SENSATION IN FEET: 0
PERSON REPORTING PAIN: PATIENT
PAIN: 1
PAIN SEVERITY GOAL: 0/10
DEPRESSION: 0

## 2024-01-17 ASSESSMENT — PAIN SCALES - PAIN ASSESSMENT IN ADVANCED DEMENTIA (PAINAD)
CONSOLABILITY: 0 - NO NEED TO CONSOLE.
NEGVOCALIZATION: 0
BREATHING: 0
BODYLANGUAGE: 0 - RELAXED.
CONSOLABILITY: 0
TOTALSCORE: 0
BODYLANGUAGE: 0
FACIALEXPRESSION: 0 - SMILING OR INEXPRESSIVE.
NEGVOCALIZATION: 0 - NONE.
FACIALEXPRESSION: 0

## 2024-01-18 ENCOUNTER — TELEPHONE (OUTPATIENT)
Dept: ORTHOPEDIC SURGERY | Facility: CLINIC | Age: 59
End: 2024-01-18

## 2024-01-18 DIAGNOSIS — M17.12 PRIMARY OSTEOARTHRITIS OF LEFT KNEE: ICD-10-CM

## 2024-01-18 RX ORDER — TRAMADOL HYDROCHLORIDE 50 MG/1
50 TABLET ORAL EVERY 6 HOURS PRN
Qty: 28 TABLET | Refills: 0 | Status: SHIPPED | OUTPATIENT
Start: 2024-01-18 | End: 2024-01-25

## 2024-01-18 NOTE — TELEPHONE ENCOUNTER
Dr. Fuentes is out on PTO    Patient had a L TKA done on 12/29/23    She needs a refill on the Tramadol 50mg sent to the [facility] in [place].

## 2024-01-23 ENCOUNTER — OFFICE VISIT (OUTPATIENT)
Dept: ORTHOPEDIC SURGERY | Facility: CLINIC | Age: 59
End: 2024-01-23
Payer: COMMERCIAL

## 2024-01-23 ENCOUNTER — EVALUATION (OUTPATIENT)
Dept: PHYSICAL THERAPY | Facility: CLINIC | Age: 59
End: 2024-01-23
Payer: COMMERCIAL

## 2024-01-23 DIAGNOSIS — Z96.652 S/P TKR (TOTAL KNEE REPLACEMENT), LEFT: Primary | ICD-10-CM

## 2024-01-23 DIAGNOSIS — Z74.09 IMPAIRED FUNCTIONAL MOBILITY AND ACTIVITY TOLERANCE: ICD-10-CM

## 2024-01-23 DIAGNOSIS — M17.0 PRIMARY OSTEOARTHRITIS OF BOTH KNEES: Primary | ICD-10-CM

## 2024-01-23 PROCEDURE — 1036F TOBACCO NON-USER: CPT | Performed by: ORTHOPAEDIC SURGERY

## 2024-01-23 PROCEDURE — 97110 THERAPEUTIC EXERCISES: CPT | Mod: GP

## 2024-01-23 PROCEDURE — 99024 POSTOP FOLLOW-UP VISIT: CPT | Performed by: ORTHOPAEDIC SURGERY

## 2024-01-23 PROCEDURE — 97161 PT EVAL LOW COMPLEX 20 MIN: CPT | Mod: GP

## 2024-01-23 NOTE — PROGRESS NOTES
ORTHOPEDIC TOTAL JOINT  POST-OPERATIVE FOLLOW UP      ============================  IMPRESSION/PLAN:  ============================  58 y.o. female s/p Left Total Knee Replacement completed on 12/29/2023     PLAN:  -Weightbearing: Weight bearing as tolerated  -DVT Prophylaxis: Aspirin 81 mg twice daily for 1 more week  -Radiology Studies: None today  -Therapies: Continue PT/OT  -Transition off assistive device as seen fit by patient and therapy  -Follow-up: 6 weeks with x-rays        Bree Bojorquez presents today for follow up of joint replacement above. Pain controlled with current treatment plan. Patient has begun physical therapy. They are currently doing therapy at [facility]. They are currently ambulating with Cane.     Review of Systems:   Constitutional: See HPI for pain assessment, No significant weight loss, recent trauma. Denies fevers/chills  Cardiovascular: No chest pain, shortness of breath  Respiratory: No difficulty breathing, cough  Gastrointestinal: No nausea, vomiting, diarrhea, constipation  Musculoskeletal: Noted in HPI, no arthralgias   Integumentary: No rashes, easy bruising, redness   Neurological: no numbness or tingling in extremities, no gait disturbances     Patient Active Problem List   Diagnosis    Anxiety, generalized    Arthritis of knee, right    Benign essential hypertension    Chronic pain of both knees    Elevated alkaline phosphatase level    Excessive sweating    Eyelash scantiness    Acquired pes planus of both feet    Hot flashes, menopausal    Overweight (BMI 25.0-29.9)    Prediabetes    Primary osteoarthritis of both knees    Rotator cuff tendinitis    Tear of left rotator cuff    Vitamin D deficiency    Actinic keratosis    Anterior cruciate ligament complete tear, right, subsequent encounter    Congenital deformities of feet    Insulin resistance    Mixed hyperlipidemia    Acute non-recurrent sinusitis    Allergic contact dermatitis due to plants, except  food    Hemangioma of skin and subcutaneous tissue    Other specified follicular disorders    Scar condition and fibrosis of skin    Hypertrophic scar    Lichen simplex chronicus    Melanocytic nevi of scalp and neck    Melanocytic nevi of trunk    Melanocytic nevi of unspecified lower limb, including hip    Melanocytic nevi of unspecified upper limb, including shoulder    Neoplasm of uncertain behavior of skin    Neoplasm of unspecified behavior of bone, soft tissue, and skin    Other melanin hyperpigmentation    Personal history of other malignant neoplasm of skin    Prurigo nodularis    Rosacea, unspecified    Squamous cell carcinoma of skin of other part of trunk    Other seborrheic keratosis    Skin changes due to chronic exposure to nonionizing radiation, unspecified    Palpitations    Scar    Bacterial folliculitis    Arthritis of knee, left    Tendinitis of left rotator cuff    Osteoarthritis of left knee    Rupture of anterior cruciate ligament of right knee    Impaired functional mobility and activity tolerance    S/P total knee arthroplasty, right    Asthma    GERD (gastroesophageal reflux disease)    S/P TKR (total knee replacement), left       =================================  EXAM  =================================  GENERAL: A/Ox3, NAD. Appears healthy, well nourished  CARDIAC: regular rate  LUNGS: Breathing non-labored    MUSCULOSKELETAL:  Laterality: left knee  - Incision: No overlying, erythema, induration, or drainage. Incision healing well with no wound dehiscence  - ROM: 5 to 100 degrees  - Palpation: appropriate post operative TTP along surgical incision  - compartments soft, negative homans  - can active straight leg raise with no extensor lag    NEUROVASCULAR:  - Neurovascular Status: sensation intact to light touch distally  - Capillary refill brisk at extremities, Bilateral dorsalis pedis pulse 2+     IMAGING: None today      Lisa Fuentes, DO  Attending Surgeon  Joint Replacement and  Adult Reconstructive Surgery  [place], [place]

## 2024-01-23 NOTE — PROGRESS NOTES
Physical Therapy Evaluation    Patient Name: Bree Bojorquez  MRN: [id number]  Today's Date: 1/23/24  Visit: 1  Referred by: ARGELIA Fuentes     Time Calculation  Start Time: 0045  Stop Time: 0130  Time Calculation (min): 45 min    1. S/P TKR (total knee replacement), left  Follow Up In Physical Therapy      2. Impaired functional mobility and activity tolerance  Follow Up In Physical Therapy          PRECAUTIONS:   RTKA    SUBJECTIVE:  Is doing well post op L TKA. Less nausea and pain issues than last time. Feels she is doing well overall for 3 weeks post op. Is able to do a lot more than she expected. Sleep is not great. Using tramadol. Using CP.   Is moving about the house. Not yet driving.   Corrected angle of the leg - knees now straight.     Pain:  3/10 left knee, 0/10 right stiff   Home Living:  Multi level home    works     Prior level of function:  INDP     OBJECTIVE:  LEFS 43/80    Knee AROM: (degrees) Left Right   Flexion 92 115   Extension 5 3       Knee Strength: MMT Left Right   Flexion 4-/5 4+/5   Extension 4-/5 4+/5   Hip AROM WFL KENYA   Hip flex left 4-/5 right 4+/5  Hip abd 4-/5 left, 4/5 right  Hip add 4+/5 kenya  Hip ext 4/5 kenya  Ankle WFL     Swelling/Girth: minor swelling at KJL, malleolus normal     Gait: ambulation antalgic cane mod indp, off loading LLE, lack of flexion left LE     Palpation: incision healing, dry, areas of scabbing, no drainage, negative homans kenya   Patellar mobility: restricted   Flexibility:   Hamstrings: mod deficit    Quadriceps: mod deficit    Hip Flexors: mild deficit       ASSESSMENT:  Pt is s/p TKA left with restriction in ROM, strength, gait, and functional mobility. Pt is doing well in her rehab process with good understanding of HEP and healing. Pt is doing well with pain control and edema control. Pt will need skilled PT to improve ROM, strength, balance, normalize gait, and return to community activity.     TREATMENT:  X 10 PROM AROM per HEP below  PROM   Gait  training     PATIENT EDUCATION:  HEP  goals  safety  POC  interventions selected    PLAN:   Pt to be seen 2 times per week for 8 weeks.   Pt POC to include:  Therapeutic EX, Therapeutic ACT, NMR, Self care, Manual therapy, Gait training, CP/MHP, Education      Rehab potential:  Good   Plan of care agreement  Patient   GOALS:  Per ep idose tab, not correctly importing

## 2024-01-25 ENCOUNTER — TREATMENT (OUTPATIENT)
Dept: PHYSICAL THERAPY | Facility: CLINIC | Age: 59
End: 2024-01-25
Payer: COMMERCIAL

## 2024-01-25 ENCOUNTER — APPOINTMENT (OUTPATIENT)
Dept: ORTHOPEDIC SURGERY | Facility: CLINIC | Age: 59
End: 2024-01-25
Payer: COMMERCIAL

## 2024-01-25 DIAGNOSIS — Z74.09 IMPAIRED FUNCTIONAL MOBILITY AND ACTIVITY TOLERANCE: ICD-10-CM

## 2024-01-25 DIAGNOSIS — Z96.652 S/P TKR (TOTAL KNEE REPLACEMENT), LEFT: Primary | ICD-10-CM

## 2024-01-25 PROCEDURE — 97110 THERAPEUTIC EXERCISES: CPT | Mod: GP

## 2024-01-25 NOTE — PROGRESS NOTES
Physical Therapy Treatment    Patient Name: Bree Bojorquez  MRN: [id number]  Today's Date: 1/25/2024  Diagnosis:   1. S/P TKR (total knee replacement), left        2. Impaired functional mobility and activity tolerance          Visit # 2    PRECAUTIONS:  Right TKA 2023    SUBJECTIVE:  Is sore. Has some discomfort, not sleeping well.     OBJECTIVE:  UE assist dependent for dynamic stepping activity    TREATMENT:  - Therex:  Stair stretches - hip flex, hamstring, gastroc 3 x 20 sec kamla     X 15:  Stair taps  Step ups 4 inch  CR  Hip abd   Hip extension   Marches 1.5#  Cone step over lateral, anterior posterior     Seated:  LAQ NV    Supine x 20 kamla:  SLR AA  SAQ 1.5#  S/l hip abduction   Bridge with green TB abd  TB abduction hook lye   QS   - - Modalities:      Declined due to her schedule     ASSESSMENT:   Pt with skilled need for PT to improve gait, strength, ROM, and rylee of mobility. All functional mobility and gait is very antalgic. Pt dependent on UE assist for improved movement and quality. Pt is progressing with current program. Pt with improved WB thru LLE. Challenged with SAQ. Step up with kamla UE assist. Fatigued end of session.     PLAN:    Continue per POC for TKA protocol.

## 2024-01-29 DIAGNOSIS — T75.3XXA MOTION SICKNESS, INITIAL ENCOUNTER: Primary | ICD-10-CM

## 2024-01-29 RX ORDER — SCOLOPAMINE TRANSDERMAL SYSTEM 1 MG/1
1 PATCH, EXTENDED RELEASE TRANSDERMAL
Qty: 4 PATCH | Refills: 2 | Status: SHIPPED | OUTPATIENT
Start: 2024-01-29 | End: 2024-03-29

## 2024-01-30 ENCOUNTER — TREATMENT (OUTPATIENT)
Dept: PHYSICAL THERAPY | Facility: CLINIC | Age: 59
End: 2024-01-30
Payer: COMMERCIAL

## 2024-01-30 DIAGNOSIS — Z96.652 S/P TKR (TOTAL KNEE REPLACEMENT), LEFT: Primary | ICD-10-CM

## 2024-01-30 DIAGNOSIS — Z74.09 IMPAIRED FUNCTIONAL MOBILITY AND ACTIVITY TOLERANCE: ICD-10-CM

## 2024-01-30 PROCEDURE — 97110 THERAPEUTIC EXERCISES: CPT | Mod: GP

## 2024-01-30 NOTE — PROGRESS NOTES
Physical Therapy Treatment    Patient Name: Bree Bojorquez  MRN: [id number]  Today's Date: 1/30/2024  Diagnosis:   1. S/P TKR (total knee replacement), left        2. Impaired functional mobility and activity tolerance          Visit # 3    PRECAUTIONS:  Right TKA 2023    SUBJECTIVE:  Her nausea has returned. Her knee is stiff and has some strong discomfort at times. Doing OK today.      OBJECTIVE:  Ambulation with mild antalgia INDP  AROM 2-109 supine     TREATMENT:  - Therex:  Stair stretches - hip flex, hamstring, gastroc 3 x 20 sec kamla   Bike level 2 5 min.     Dynamic step activity for gait training   X 15:  Stair taps  Step ups 4 inch  CR  Hip abd   Hip extension   Marches 1.5#  Cone step over lateral, anterior posterior     Seated:  LAQ 1.5#    Supine x 20 kamla:  SLR AA  SAQ 1.5#  S/l hip abduction   Bridge with green TB abd  TB abduction hook lye   QS   - - Modalities:    CP x 10 min seated     ASSESSMENT:  Pt with good rylee of motion and interventions today. Rest given as needed. UE assist needed for stability during CKC exercise. Pt able to progress toward strength goals. Dynamic stepping to normalize stance phase and heel strike of  LLE. Pt cued for quality of motion. Overall rylee well with improved ability.        PLAN:    Continue per POC for TKA protocol.

## 2024-02-01 ENCOUNTER — TREATMENT (OUTPATIENT)
Dept: PHYSICAL THERAPY | Facility: CLINIC | Age: 59
End: 2024-02-01
Payer: COMMERCIAL

## 2024-02-01 DIAGNOSIS — Z96.652 S/P TKR (TOTAL KNEE REPLACEMENT), LEFT: ICD-10-CM

## 2024-02-01 DIAGNOSIS — Z74.09 IMPAIRED FUNCTIONAL MOBILITY AND ACTIVITY TOLERANCE: ICD-10-CM

## 2024-02-01 PROCEDURE — 97110 THERAPEUTIC EXERCISES: CPT | Mod: GP

## 2024-02-01 NOTE — PROGRESS NOTES
Physical Therapy Treatment    Patient Name: Bree Bojorquez  MRN: [id number]  Today's Date: 2/1/2024  Diagnosis:   1. S/P TKR (total knee replacement), left  Follow Up In Physical Therapy      2. Impaired functional mobility and activity tolerance  Follow Up In Physical Therapy        Visit # 4    PRECAUTIONS:  Right TKA 2023    SUBJECTIVE:  Pt with soreness, feels weak. Nausea remains a problem.     OBJECTIVE:      TREATMENT:  - Therex:  Stair stretches - hip flex, hamstring, gastroc 3 x 20 sec kamla     Bike level 2 6 min.     Dynamic step activity for gait training   X 20:  Stair taps  Step ups 4 inch  CR  Hip abd   Hip extension   Marches 1.5#  Cone step over lateral, anterior posterior     Seated:  LAQ 1.5#    Supine x 20 kamla:  SLR AA  SAQ 1.5#  S/l hip abduction   Bridge with green TB abd  TB abduction hook lye   QS     - - Modalities:    CP x 10 min seated     ASSESSMENT:  Pt able to rylee more reps and CKC exercise. Able to load LLE during dynamic stepping. AROM progressing with good springy end feel. Improved movement into TKE with ability to hold.     PLAN:    Continue per POC for TKA protocol.

## 2024-02-06 ENCOUNTER — TELEPHONE (OUTPATIENT)
Dept: ORTHOPEDIC SURGERY | Facility: CLINIC | Age: 59
End: 2024-02-06

## 2024-02-06 ENCOUNTER — TREATMENT (OUTPATIENT)
Dept: PHYSICAL THERAPY | Facility: CLINIC | Age: 59
End: 2024-02-06
Payer: COMMERCIAL

## 2024-02-06 DIAGNOSIS — M17.0 PRIMARY OSTEOARTHRITIS OF BOTH KNEES: Primary | ICD-10-CM

## 2024-02-06 DIAGNOSIS — Z96.652 S/P TKR (TOTAL KNEE REPLACEMENT), LEFT: ICD-10-CM

## 2024-02-06 DIAGNOSIS — Z74.09 IMPAIRED FUNCTIONAL MOBILITY AND ACTIVITY TOLERANCE: ICD-10-CM

## 2024-02-06 PROCEDURE — 97110 THERAPEUTIC EXERCISES: CPT | Mod: GP

## 2024-02-06 RX ORDER — TRAMADOL HYDROCHLORIDE 50 MG/1
50 TABLET ORAL EVERY 6 HOURS PRN
Qty: 28 TABLET | Refills: 0 | Status: SHIPPED | OUTPATIENT
Start: 2024-02-06 | End: 2024-02-13

## 2024-02-06 NOTE — PROGRESS NOTES
Physical Therapy Treatment    Patient Name: Bree Bojorquez  MRN: [id number]  Today's Date: 2/6/2024  Diagnosis:   1. S/P TKR (total knee replacement), left  Follow Up In Physical Therapy      2. Impaired functional mobility and activity tolerance  Follow Up In Physical Therapy        Visit # 5    PRECAUTIONS:  Right TKA 2023    SUBJECTIVE:  Nausea still a problem knee is OK. Has been able to do revolutions on her bike at home.      OBJECTIVE:  Flexion WFL     TREATMENT:  - Therex:  Stair stretches - hip flex, hamstring, gastroc 3 x 20 sec kamla     Bike level 2 6 min.   TG 2 x 10 stop at level 5    Dynamic step activity for gait training     X 20:  Stair taps  Step ups 4 inch  CR  Hip abd   Hip extension   Marches 1.5#  Cone step over lateral, anterior posterior     Seated:  LAQ 2#    Supine x 20 kamla:  SLR AA  SAQ 1.5#  S/l hip abduction   Bridge with green TB abd  TB abduction hook lye   QS     - - Modalities:    CP x 10 min seated     ASSESSMENT:  Pt rylee well with no reports of pain during exercise progression. Fatigued. Improved ROM. End range discomfort into extension. Pt with improved gait technique when cued for arm swing and upright posture. Overall improved functional mobility INDP, mild antalgia. Progressing to goals well.     PLAN:    Continue per POC for TKA protocol.

## 2024-02-06 NOTE — TELEPHONE ENCOUNTER
Patient called in stating she had LTKA surgery 12/29/23 and would like a refill of tramadol. States she has roughly 10 pills left. Please advise.     Pharmacy: [facility]

## 2024-02-08 ENCOUNTER — TREATMENT (OUTPATIENT)
Dept: PHYSICAL THERAPY | Facility: CLINIC | Age: 59
End: 2024-02-08
Payer: COMMERCIAL

## 2024-02-08 DIAGNOSIS — Z96.652 S/P TKR (TOTAL KNEE REPLACEMENT), LEFT: ICD-10-CM

## 2024-02-08 DIAGNOSIS — Z74.09 IMPAIRED FUNCTIONAL MOBILITY AND ACTIVITY TOLERANCE: ICD-10-CM

## 2024-02-08 PROCEDURE — 97110 THERAPEUTIC EXERCISES: CPT | Mod: GP

## 2024-02-08 NOTE — PROGRESS NOTES
Physical Therapy Treatment    Patient Name: Bree Bojorquez  MRN: [id number]  Today's Date: 2/8/2024  Diagnosis:   1. S/P TKR (total knee replacement), left  Follow Up In Physical Therapy      2. Impaired functional mobility and activity tolerance  Follow Up In Physical Therapy        Visit # 6    PRECAUTIONS:  Right TKA 2023    SUBJECTIVE:  Continuous to struggle with nausea. Removed nausea patch and feels not well.     OBJECTIVE:  Left knee flexion 116  Right knee flexion 117    TREATMENT:  - Therex:  Stair stretches - hip flex, hamstring, gastroc 3 x 20 sec kamla     Bike level 2 6 min.   TG 2 x 10 stop at level 5    Dynamic step activity for gait training     X 20:  Stair taps  Step ups 4 inch  CR  Hip abd   Hip extension   Marches 1.5#  Cone step over lateral, anterior posterior     Seated:  LAQ 2#    Supine x 20 kamla:  SLR AA  SAQ 1.5#  S/l hip abduction   Bridge with green TB abd  TB abduction hook lye   QS     - - Modalities:    CP x 10 min seated     ASSESSMENT:    Pt challenged with TKE. Pt challenged with eccentric step downs. Pt able to progress well with dynamic strength. Limited by nausea in that she just didn't feel well, motivated, worked hard. Progressing. ROM doing well.     PLAN:    Continue per POC for TKA protocol.

## 2024-02-13 ENCOUNTER — TREATMENT (OUTPATIENT)
Dept: PHYSICAL THERAPY | Facility: CLINIC | Age: 59
End: 2024-02-13
Payer: COMMERCIAL

## 2024-02-13 DIAGNOSIS — Z74.09 IMPAIRED FUNCTIONAL MOBILITY AND ACTIVITY TOLERANCE: ICD-10-CM

## 2024-02-13 DIAGNOSIS — Z96.652 S/P TKR (TOTAL KNEE REPLACEMENT), LEFT: ICD-10-CM

## 2024-02-13 PROCEDURE — 97110 THERAPEUTIC EXERCISES: CPT | Mod: GP

## 2024-02-13 NOTE — PROGRESS NOTES
Physical Therapy Treatment    Patient Name: Bree Bojorquez  MRN: [id number]  Today's Date: 2/13/2024  Diagnosis:   1. S/P TKR (total knee replacement), left  Follow Up In Physical Therapy      2. Impaired functional mobility and activity tolerance  Follow Up In Physical Therapy        Visit # 7    PRECAUTIONS:  Right TKA 2023    SUBJECTIVE:  Nausea is much better. Feels better not taking any meds.   Knee is OK.     OBJECTIVE:  Ambulation INDP non antalgically 10 ft     TREATMENT:  - Therex:  Stair stretches - hip flex, hamstring, gastroc 3 x 20 sec kamla     Bike level 2 6 min.   TG 2 x 10 stop at level 5    Dynamic step activity for gait training     X 20:  Stair taps  Step ups 4 inch  CR  Hip abd   Hip extension   Marches 1.5#  Cone step over lateral, anterior posterior     Seated:  LAQ 2#    Supine x 20 kamla:  SLR AA  SAQ 1.5#  S/l hip abduction   Bridge with green TB abd  TB abduction hook lye   QS     - - Modalities:    CP x 10 min seated     ASSESSMENT:  Pt has been able to improve gait with cues. Pt with improved rylee of exercise with reduced UE support. Pt is progressing well. AROM WFL. Pt with need for quad strength, balance, and improved movement patterns. Pt is progressing well.     PLAN:    Continue per POC for TKA protocol.

## 2024-02-16 ENCOUNTER — TELEPHONE (OUTPATIENT)
Dept: ORTHOPEDIC SURGERY | Facility: CLINIC | Age: 59
End: 2024-02-16
Payer: COMMERCIAL

## 2024-02-16 DIAGNOSIS — I10 BENIGN ESSENTIAL HYPERTENSION: ICD-10-CM

## 2024-02-16 DIAGNOSIS — M17.12 PRIMARY OSTEOARTHRITIS OF LEFT KNEE: ICD-10-CM

## 2024-02-16 RX ORDER — LISINOPRIL 10 MG/1
10 TABLET ORAL DAILY
Qty: 30 TABLET | Refills: 0 | Status: SHIPPED | OUTPATIENT
Start: 2024-02-16 | End: 2024-03-08 | Stop reason: SDUPTHER

## 2024-02-16 RX ORDER — PANTOPRAZOLE SODIUM 40 MG/1
40 TABLET, DELAYED RELEASE ORAL DAILY PRN
Qty: 30 TABLET | Refills: 0 | Status: SHIPPED | OUTPATIENT
Start: 2024-02-16 | End: 2024-03-17

## 2024-02-16 NOTE — TELEPHONE ENCOUNTER
Patient underwent a L TKA done 12/29/23 and R TKA done 9/15/23. She would like a refill of her protonix.     She would like this sent to the [facility] pharmacy on file in [place].

## 2024-02-20 ENCOUNTER — TREATMENT (OUTPATIENT)
Dept: PHYSICAL THERAPY | Facility: CLINIC | Age: 59
End: 2024-02-20
Payer: COMMERCIAL

## 2024-02-20 DIAGNOSIS — Z74.09 IMPAIRED FUNCTIONAL MOBILITY AND ACTIVITY TOLERANCE: ICD-10-CM

## 2024-02-20 DIAGNOSIS — Z96.652 S/P TKR (TOTAL KNEE REPLACEMENT), LEFT: ICD-10-CM

## 2024-02-20 PROCEDURE — 97110 THERAPEUTIC EXERCISES: CPT | Mod: GP

## 2024-02-20 NOTE — PROGRESS NOTES
Physical Therapy Treatment    Patient Name: Bree Bojorquez  MRN: [id number]  Today's Date: 2/20/2024  Diagnosis:   1. S/P TKR (total knee replacement), left  Follow Up In Physical Therapy      2. Impaired functional mobility and activity tolerance  Follow Up In Physical Therapy        Visit # 8    PRECAUTIONS:  Right TKA 2023    SUBJECTIVE:  Is better with her nausea. Has night discomfort. Feels she needs more coordination, knows she is walking funny.     OBJECTIVE:  Ambulation INDP with mild antalgia presenting with forward flexion of the hips kamla and TKE using more hip circumduction. Avoids eccentric flexion.     TREATMENT:  - Therex:  Stair stretches - hip flex, hamstring, gastroc 3 x 20 sec kamla     Bike level 2 6 min.   TG 2 x 10 stop at level 5    Dynamic step activity for gait training     X 20:  Stair taps  Step ups 4 inch  CR  Hip abd   Hip extension   Marches 1.5#  Cone step over lateral, anterior posterior   Slider eccentric loaded LLE  Multi plane tap down LLE stance     Seated:  LAQ 3#    Supine x 20 kamla:  SLR AA  SAQ 3#  S/l hip abduction   Bridge with green TB abd  TB abduction hook lye   QS     - - Modalities:    CP x 10 min seated     ASSESSMENT:  Focus on eccentric strength. Focused on quad strength. Able to progress well with dynamic activity. Challenged. Needed UE support for CKC exercise when in SLS. Pt is progressing well. Cued for upright posture with standing and gait. Able to correct gait with verbal cues.       PLAN:    Continue per POC for TKA protocol.

## 2024-02-22 ENCOUNTER — APPOINTMENT (OUTPATIENT)
Dept: PHYSICAL THERAPY | Facility: CLINIC | Age: 59
End: 2024-02-22
Payer: COMMERCIAL

## 2024-02-27 ENCOUNTER — APPOINTMENT (OUTPATIENT)
Dept: PHYSICAL THERAPY | Facility: CLINIC | Age: 59
End: 2024-02-27
Payer: COMMERCIAL

## 2024-02-29 ENCOUNTER — APPOINTMENT (OUTPATIENT)
Dept: PHYSICAL THERAPY | Facility: CLINIC | Age: 59
End: 2024-02-29
Payer: COMMERCIAL

## 2024-03-04 DIAGNOSIS — Z11.59 NEED FOR HEPATITIS C SCREENING TEST: ICD-10-CM

## 2024-03-04 DIAGNOSIS — Z00.00 ANNUAL PHYSICAL EXAM: ICD-10-CM

## 2024-03-04 DIAGNOSIS — Z11.4 ENCOUNTER FOR SCREENING FOR HIV: ICD-10-CM

## 2024-03-05 ENCOUNTER — TREATMENT (OUTPATIENT)
Dept: PHYSICAL THERAPY | Facility: CLINIC | Age: 59
End: 2024-03-05
Payer: COMMERCIAL

## 2024-03-05 DIAGNOSIS — Z96.652 S/P TKR (TOTAL KNEE REPLACEMENT), LEFT: Primary | ICD-10-CM

## 2024-03-05 DIAGNOSIS — Z74.09 IMPAIRED FUNCTIONAL MOBILITY AND ACTIVITY TOLERANCE: ICD-10-CM

## 2024-03-05 PROCEDURE — 97110 THERAPEUTIC EXERCISES: CPT | Mod: GP

## 2024-03-05 NOTE — PROGRESS NOTES
Physical Therapy Treatment    Patient Name: Bree Bojorquez  MRN: [id number]  Today's Date: 3/5/2024  Diagnosis:   1. S/P TKR (total knee replacement), left        2. Impaired functional mobility and activity tolerance          Visit # 8    PRECAUTIONS:  Right TKA 2023    SUBJECTIVE:  Had swelling on her vacations. Good and bad days. Had some pain but a lot more activity.     OBJECTIVE:  Ambulation INDP with mild antalgia presenting with forward flexion of the hips kamla and TKE using more hip circumduction. Avoids eccentric flexion.     TREATMENT:  - Therex:  Stair stretches - hip flex, hamstring, gastroc 3 x 20 sec kamla     Bike level 2 6 min.   TG 2 x 10 stop at level 5    Dynamic step activity for gait training     X 20:  Stair taps  Step ups 4 inch  CR  Hip abd   Hip extension   Marches 1.5#  Cone step over lateral, anterior posterior   Slider eccentric loaded LLE  Multi plane tap down LLE stance     Seated:  LAQ 3#    Supine x 20 kamla:  SLR AA  SAQ 3#  S/l hip abduction   Bridge with green TB abd  TB abduction hook lye   QS     - - Modalities:    CP x 10 min seated     ASSESSMENT:  Challenged with current program. AROM WFL. Strength deficit in quad will continue to be addressed with skilled PT. Pt able to progress with air ex dynamic eccentric loading.     PLAN:    Continue per POC for TKA protocol.

## 2024-03-06 ENCOUNTER — LAB (OUTPATIENT)
Dept: LAB | Facility: LAB | Age: 59
End: 2024-03-06
Payer: COMMERCIAL

## 2024-03-06 DIAGNOSIS — Z00.00 ANNUAL PHYSICAL EXAM: ICD-10-CM

## 2024-03-06 DIAGNOSIS — E88.819 INSULIN RESISTANCE: ICD-10-CM

## 2024-03-06 DIAGNOSIS — Z11.4 ENCOUNTER FOR SCREENING FOR HIV: ICD-10-CM

## 2024-03-06 DIAGNOSIS — R73.03 PREDIABETES: ICD-10-CM

## 2024-03-06 DIAGNOSIS — Z11.59 NEED FOR HEPATITIS C SCREENING TEST: ICD-10-CM

## 2024-03-06 LAB
ALBUMIN SERPL BCP-MCNC: 4.4 G/DL (ref 3.4–5)
ALP SERPL-CCNC: 138 U/L (ref 33–110)
ALT SERPL W P-5'-P-CCNC: 15 U/L (ref 7–45)
ANION GAP SERPL CALC-SCNC: 12 MMOL/L (ref 10–20)
AST SERPL W P-5'-P-CCNC: 16 U/L (ref 9–39)
BASOPHILS # BLD AUTO: 0.04 X10*3/UL (ref 0–0.1)
BASOPHILS NFR BLD AUTO: 0.9 %
BILIRUB SERPL-MCNC: 0.8 MG/DL (ref 0–1.2)
BUN SERPL-MCNC: 20 MG/DL (ref 6–23)
CALCIUM SERPL-MCNC: 9.7 MG/DL (ref 8.6–10.6)
CHLORIDE SERPL-SCNC: 105 MMOL/L (ref 98–107)
CHOLEST SERPL-MCNC: 165 MG/DL (ref 0–199)
CHOLESTEROL/HDL RATIO: 3.4
CO2 SERPL-SCNC: 28 MMOL/L (ref 21–32)
CREAT SERPL-MCNC: 0.68 MG/DL (ref 0.5–1.05)
EGFRCR SERPLBLD CKD-EPI 2021: >90 ML/MIN/1.73M*2
EOSINOPHIL # BLD AUTO: 0.1 X10*3/UL (ref 0–0.7)
EOSINOPHIL NFR BLD AUTO: 2.3 %
ERYTHROCYTE [DISTWIDTH] IN BLOOD BY AUTOMATED COUNT: 12.4 % (ref 11.5–14.5)
GLUCOSE SERPL-MCNC: 126 MG/DL (ref 74–99)
HCT VFR BLD AUTO: 39.8 % (ref 36–46)
HCV AB SER QL: NONREACTIVE
HDLC SERPL-MCNC: 49.1 MG/DL
HGB BLD-MCNC: 12.9 G/DL (ref 12–16)
HIV 1+2 AB+HIV1 P24 AG SERPL QL IA: NONREACTIVE
IMM GRANULOCYTES # BLD AUTO: 0.01 X10*3/UL (ref 0–0.7)
IMM GRANULOCYTES NFR BLD AUTO: 0.2 % (ref 0–0.9)
LDLC SERPL CALC-MCNC: 88 MG/DL
LYMPHOCYTES # BLD AUTO: 1.42 X10*3/UL (ref 1.2–4.8)
LYMPHOCYTES NFR BLD AUTO: 32.1 %
MCH RBC QN AUTO: 29.2 PG (ref 26–34)
MCHC RBC AUTO-ENTMCNC: 32.4 G/DL (ref 32–36)
MCV RBC AUTO: 90 FL (ref 80–100)
MONOCYTES # BLD AUTO: 0.26 X10*3/UL (ref 0.1–1)
MONOCYTES NFR BLD AUTO: 5.9 %
NEUTROPHILS # BLD AUTO: 2.59 X10*3/UL (ref 1.2–7.7)
NEUTROPHILS NFR BLD AUTO: 58.6 %
NON HDL CHOLESTEROL: 116 MG/DL (ref 0–149)
NRBC BLD-RTO: 0 /100 WBCS (ref 0–0)
PLATELET # BLD AUTO: 231 X10*3/UL (ref 150–450)
POTASSIUM SERPL-SCNC: 4.7 MMOL/L (ref 3.5–5.3)
PROT SERPL-MCNC: 6.4 G/DL (ref 6.4–8.2)
RBC # BLD AUTO: 4.42 X10*6/UL (ref 4–5.2)
SODIUM SERPL-SCNC: 140 MMOL/L (ref 136–145)
TRIGL SERPL-MCNC: 139 MG/DL (ref 0–149)
TSH SERPL-ACNC: 1.09 MIU/L (ref 0.44–3.98)
VLDL: 28 MG/DL (ref 0–40)
WBC # BLD AUTO: 4.4 X10*3/UL (ref 4.4–11.3)

## 2024-03-06 PROCEDURE — 87389 HIV-1 AG W/HIV-1&-2 AB AG IA: CPT

## 2024-03-06 PROCEDURE — 85025 COMPLETE CBC W/AUTO DIFF WBC: CPT

## 2024-03-06 PROCEDURE — 80061 LIPID PANEL: CPT

## 2024-03-06 PROCEDURE — 80053 COMPREHEN METABOLIC PANEL: CPT

## 2024-03-06 PROCEDURE — 84443 ASSAY THYROID STIM HORMONE: CPT

## 2024-03-06 PROCEDURE — 36415 COLL VENOUS BLD VENIPUNCTURE: CPT

## 2024-03-06 PROCEDURE — 83036 HEMOGLOBIN GLYCOSYLATED A1C: CPT

## 2024-03-06 PROCEDURE — 86803 HEPATITIS C AB TEST: CPT

## 2024-03-07 ENCOUNTER — HOSPITAL ENCOUNTER (OUTPATIENT)
Dept: RADIOLOGY | Facility: CLINIC | Age: 59
Discharge: HOME | End: 2024-03-07
Payer: COMMERCIAL

## 2024-03-07 ENCOUNTER — OFFICE VISIT (OUTPATIENT)
Dept: ORTHOPEDIC SURGERY | Facility: CLINIC | Age: 59
End: 2024-03-07
Payer: COMMERCIAL

## 2024-03-07 VITALS — HEIGHT: 68 IN | BODY MASS INDEX: 26.98 KG/M2 | WEIGHT: 178 LBS

## 2024-03-07 DIAGNOSIS — M17.0 PRIMARY OSTEOARTHRITIS OF BOTH KNEES: ICD-10-CM

## 2024-03-07 PROCEDURE — 73560 X-RAY EXAM OF KNEE 1 OR 2: CPT | Mod: RIGHT SIDE | Performed by: RADIOLOGY

## 2024-03-07 PROCEDURE — 73560 X-RAY EXAM OF KNEE 1 OR 2: CPT | Mod: LT

## 2024-03-07 PROCEDURE — 1036F TOBACCO NON-USER: CPT | Performed by: ORTHOPAEDIC SURGERY

## 2024-03-07 PROCEDURE — 73560 X-RAY EXAM OF KNEE 1 OR 2: CPT | Mod: RT

## 2024-03-07 PROCEDURE — 73560 X-RAY EXAM OF KNEE 1 OR 2: CPT | Mod: LEFT SIDE | Performed by: RADIOLOGY

## 2024-03-07 PROCEDURE — 99024 POSTOP FOLLOW-UP VISIT: CPT | Performed by: ORTHOPAEDIC SURGERY

## 2024-03-07 ASSESSMENT — PAIN SCALES - GENERAL: PAINLEVEL_OUTOF10: 4

## 2024-03-07 ASSESSMENT — PAIN - FUNCTIONAL ASSESSMENT: PAIN_FUNCTIONAL_ASSESSMENT: 0-10

## 2024-03-07 NOTE — PROGRESS NOTES
ORTHOPEDIC TOTAL JOINT  POST-OPERATIVE FOLLOW UP      ============================  IMPRESSION/PLAN:  ============================  58 y.o. female s/p Left Total Knee Replacement completed on 12/29/2023, and S/P Right Total knee replacement completed on 09/15/2023.    PLAN:  Patient is doing excellent her recovery.  Bilateral knee x-rays reviewed with patient.  She has minimal issues at this time and is progressing on track.  She may continue with exercises as tolerated and transition away from therapy if she continues to improve.  She is no activity limitations at this standpoint except for running and jumping for exercise.  Will follow-up at the 1 year peterson of her left knee replacement with repeat x-rays of bilateral knees.        Bree NASRIN Bojorquez presents today for follow up of joint replacement above. Pain controlled with current treatment plan. Patient has begun physical therapy. They are currently doing therapy at [facility]. They are currently ambulating with no assistance.  She states she is progressing well.      Review of Systems:   Constitutional: See HPI for pain assessment, No significant weight loss, recent trauma. Denies fevers/chills  Cardiovascular: No chest pain, shortness of breath  Respiratory: No difficulty breathing, cough  Gastrointestinal: No nausea, vomiting, diarrhea, constipation  Musculoskeletal: Noted in HPI, no arthralgias   Integumentary: No rashes, easy bruising, redness   Neurological: no numbness or tingling in extremities, no gait disturbances     Patient Active Problem List   Diagnosis    Anxiety, generalized    Arthritis of knee, right    Benign essential hypertension    Chronic pain of both knees    Elevated alkaline phosphatase level    Excessive sweating    Eyelash scantiness    Acquired pes planus of both feet    Hot flashes, menopausal    Overweight (BMI 25.0-29.9)    Prediabetes    Primary osteoarthritis of both knees    Rotator cuff tendinitis    Tear of  left rotator cuff    Vitamin D deficiency    Actinic keratosis    Anterior cruciate ligament complete tear, right, subsequent encounter    Congenital deformities of feet    Insulin resistance    Mixed hyperlipidemia    Acute non-recurrent sinusitis    Allergic contact dermatitis due to plants, except food    Hemangioma of skin and subcutaneous tissue    Other specified follicular disorders    Scar condition and fibrosis of skin    Hypertrophic scar    Lichen simplex chronicus    Melanocytic nevi of scalp and neck    Melanocytic nevi of trunk    Melanocytic nevi of unspecified lower limb, including hip    Melanocytic nevi of unspecified upper limb, including shoulder    Neoplasm of uncertain behavior of skin    Neoplasm of unspecified behavior of bone, soft tissue, and skin    Other melanin hyperpigmentation    Personal history of other malignant neoplasm of skin    Prurigo nodularis    Rosacea, unspecified    Squamous cell carcinoma of skin of other part of trunk    Other seborrheic keratosis    Skin changes due to chronic exposure to nonionizing radiation, unspecified    Palpitations    Scar    Bacterial folliculitis    Arthritis of knee, left    Tendinitis of left rotator cuff    Osteoarthritis of left knee    Rupture of anterior cruciate ligament of right knee    Impaired functional mobility and activity tolerance    S/P total knee arthroplasty, right    Asthma    GERD (gastroesophageal reflux disease)    S/P TKR (total knee replacement), left       =================================  EXAM  =================================  GENERAL: A/Ox3, NAD. Appears healthy, well nourished  CARDIAC: regular rate  LUNGS: Breathing non-labored    MUSCULOSKELETAL:  Laterality: Bilateral knee  - Incision: Well-healed bilateral  - ROM: 0 to 110 degrees right knee, 0 to 115 degrees left knee  - Palpation:  minimal tenderness along the knees  - compartments soft, negative homans  - can active straight leg raise with no extensor  lag    NEUROVASCULAR:  - Neurovascular Status: sensation intact to light touch distally  - Capillary refill brisk at extremities, Bilateral dorsalis pedis pulse 2+     IMAGIN views of bilateral knees demonstrate no signs of loosening or failure bilateral total knee arthroplasty. X-rays were personally reviewed by me.  Radiology reports were reviewed by me as well, if available at the time.      iLsa Fuentes DO  Attending Surgeon  Joint Replacement and Adult Reconstructive Surgery  [place], [place]

## 2024-03-07 NOTE — RESULT ENCOUNTER NOTE
We will review in the office on March 8:  Glucose 126 previously 154, 78  Electrolytes, kidney, liver normal  Alkaline phosphatase a bit high at 138 with normal below 110.  The highest it had been prior was 116 about a year ago  Hepatitis C and HIV negative  Thyroid perfect with a TSH of 1.09  Cholesterol 165, 49, 88, 139 which is excellent across-the-board  Complete blood cell count is normal

## 2024-03-08 ENCOUNTER — OFFICE VISIT (OUTPATIENT)
Dept: PRIMARY CARE | Facility: CLINIC | Age: 59
End: 2024-03-08
Payer: COMMERCIAL

## 2024-03-08 VITALS
BODY MASS INDEX: 27.58 KG/M2 | HEIGHT: 68 IN | HEART RATE: 98 BPM | DIASTOLIC BLOOD PRESSURE: 72 MMHG | WEIGHT: 182 LBS | SYSTOLIC BLOOD PRESSURE: 114 MMHG

## 2024-03-08 DIAGNOSIS — E88.819 INSULIN RESISTANCE: ICD-10-CM

## 2024-03-08 DIAGNOSIS — I10 BENIGN ESSENTIAL HYPERTENSION: ICD-10-CM

## 2024-03-08 DIAGNOSIS — M17.0 PRIMARY OSTEOARTHRITIS OF BOTH KNEES: ICD-10-CM

## 2024-03-08 DIAGNOSIS — Z00.00 PHYSICAL EXAM, ANNUAL: Primary | ICD-10-CM

## 2024-03-08 DIAGNOSIS — J45.20 MILD INTERMITTENT ASTHMA WITHOUT COMPLICATION (HHS-HCC): ICD-10-CM

## 2024-03-08 DIAGNOSIS — R73.03 PREDIABETES: ICD-10-CM

## 2024-03-08 DIAGNOSIS — E78.2 MIXED HYPERLIPIDEMIA: ICD-10-CM

## 2024-03-08 DIAGNOSIS — F41.1 ANXIETY, GENERALIZED: ICD-10-CM

## 2024-03-08 DIAGNOSIS — Z96.651 S/P TOTAL KNEE ARTHROPLASTY, RIGHT: ICD-10-CM

## 2024-03-08 DIAGNOSIS — Z96.652 S/P TKR (TOTAL KNEE REPLACEMENT), LEFT: ICD-10-CM

## 2024-03-08 DIAGNOSIS — G25.81 RLS (RESTLESS LEGS SYNDROME): ICD-10-CM

## 2024-03-08 DIAGNOSIS — R74.8 ELEVATED ALKALINE PHOSPHATASE LEVEL: ICD-10-CM

## 2024-03-08 DIAGNOSIS — K21.9 GASTROESOPHAGEAL REFLUX DISEASE WITHOUT ESOPHAGITIS: ICD-10-CM

## 2024-03-08 PROBLEM — Z85.828 PERSONAL HISTORY OF OTHER MALIGNANT NEOPLASM OF SKIN: Status: RESOLVED | Noted: 2022-08-08 | Resolved: 2024-03-08

## 2024-03-08 PROBLEM — D48.5 NEOPLASM OF UNCERTAIN BEHAVIOR OF SKIN: Status: RESOLVED | Noted: 2022-08-08 | Resolved: 2024-03-08

## 2024-03-08 PROBLEM — L90.5 SCAR CONDITION AND FIBROSIS OF SKIN: Status: RESOLVED | Noted: 2022-08-08 | Resolved: 2024-03-08

## 2024-03-08 PROBLEM — E66.3 OVERWEIGHT (BMI 25.0-29.9): Status: RESOLVED | Noted: 2023-06-14 | Resolved: 2024-03-08

## 2024-03-08 PROBLEM — J01.90 ACUTE NON-RECURRENT SINUSITIS: Status: RESOLVED | Noted: 2023-07-19 | Resolved: 2024-03-08

## 2024-03-08 PROBLEM — S83.511D ANTERIOR CRUCIATE LIGAMENT COMPLETE TEAR, RIGHT, SUBSEQUENT ENCOUNTER: Status: RESOLVED | Noted: 2023-07-19 | Resolved: 2024-03-08

## 2024-03-08 PROBLEM — L90.5 SCAR: Status: RESOLVED | Noted: 2022-08-08 | Resolved: 2024-03-08

## 2024-03-08 PROBLEM — C44.529 SQUAMOUS CELL CARCINOMA OF SKIN OF OTHER PART OF TRUNK: Status: RESOLVED | Noted: 2022-08-08 | Resolved: 2024-03-08

## 2024-03-08 PROBLEM — D49.2 NEOPLASM OF UNSPECIFIED BEHAVIOR OF BONE, SOFT TISSUE, AND SKIN: Status: RESOLVED | Noted: 2022-08-08 | Resolved: 2024-03-08

## 2024-03-08 PROBLEM — Z74.09 IMPAIRED FUNCTIONAL MOBILITY AND ACTIVITY TOLERANCE: Status: RESOLVED | Noted: 2023-10-09 | Resolved: 2024-03-08

## 2024-03-08 LAB
EST. AVERAGE GLUCOSE BLD GHB EST-MCNC: 120 MG/DL
HBA1C MFR BLD: 5.8 %

## 2024-03-08 PROCEDURE — 3078F DIAST BP <80 MM HG: CPT | Performed by: FAMILY MEDICINE

## 2024-03-08 PROCEDURE — 99396 PREV VISIT EST AGE 40-64: CPT | Performed by: FAMILY MEDICINE

## 2024-03-08 PROCEDURE — 3074F SYST BP LT 130 MM HG: CPT | Performed by: FAMILY MEDICINE

## 2024-03-08 PROCEDURE — 99214 OFFICE O/P EST MOD 30 MIN: CPT | Performed by: FAMILY MEDICINE

## 2024-03-08 PROCEDURE — 93000 ELECTROCARDIOGRAM COMPLETE: CPT | Performed by: FAMILY MEDICINE

## 2024-03-08 PROCEDURE — 1036F TOBACCO NON-USER: CPT | Performed by: FAMILY MEDICINE

## 2024-03-08 RX ORDER — MELOXICAM 15 MG/1
15 TABLET ORAL DAILY
COMMUNITY
End: 2024-03-08 | Stop reason: SDUPTHER

## 2024-03-08 RX ORDER — METFORMIN HYDROCHLORIDE 750 MG/1
750 TABLET, EXTENDED RELEASE ORAL
Qty: 180 TABLET | Refills: 0 | Status: SHIPPED | OUTPATIENT
Start: 2024-03-08 | End: 2024-06-06

## 2024-03-08 RX ORDER — MELOXICAM 15 MG/1
15 TABLET ORAL
Qty: 90 TABLET | Refills: 1 | Status: SHIPPED | OUTPATIENT
Start: 2024-03-08 | End: 2024-09-04

## 2024-03-08 RX ORDER — ESCITALOPRAM OXALATE 10 MG/1
10 TABLET ORAL DAILY
Qty: 90 TABLET | Refills: 0 | Status: SHIPPED | OUTPATIENT
Start: 2024-03-08 | End: 2024-06-06

## 2024-03-08 RX ORDER — ATORVASTATIN CALCIUM 20 MG/1
20 TABLET, FILM COATED ORAL DAILY
Qty: 90 TABLET | Refills: 0 | Status: SHIPPED | OUTPATIENT
Start: 2024-03-08 | End: 2024-06-04

## 2024-03-08 RX ORDER — LISINOPRIL 10 MG/1
10 TABLET ORAL DAILY
Qty: 30 TABLET | Refills: 0 | Status: SHIPPED | OUTPATIENT
Start: 2024-03-08 | End: 2024-04-15

## 2024-03-08 NOTE — ASSESSMENT & PLAN NOTE
Complete history and physical examination was performed  EKG reveals sinus rhythm without acute changes  Blood work reviewed

## 2024-03-08 NOTE — PROGRESS NOTES
Subjective   Patient ID: Bree Bojorquez is a 58 y.o. female who presents for Annual Exam.    Past Medical, Surgical, and Family History reviewed and updated in chart.    Reviewed all medications by prescribing practitioner or clinical pharmacist (such as prescriptions, OTCs, herbal therapies and supplements) and documented in the medical record.    HPI  1.  Annual Physical.  Immunizations up-to-date  Colon Cancer Screening: No family history, done in 2016 with 10 year clearance  OB/GYN: Sees Dr. Padron, h/o hysterectomy and single oophrectomy, Pap smear and mammogram done in 12/2023  Exercise: likes being active however had bilateral knee replacements (9/2023 and 12/2023) and has been working with PT to regain mobility  Tobacco: Denies use  Completed blood work prior to today's visit     2.  Hypertension.  Normotensive on intake with repeat at 114/72  No chest pain or shortness of breath  Tolerates medication well, requesting refill     3.  Prediabetes.  Eri reports that she is currently feeling well on Metformin with no gastrointestinal side effects and is requesting a refill. Her recent Hemoglobin A1c level was 6.1%, with previous readings of 5.9%, 5.6%, and 6.0%. Her last fasting glucose level was 122.    Eri acknowledges that her fasting glucose levels have been elevated in recent tests, attributing this to her inactivity following her knee replacement surgeries and poor dietary choices. She anticipated that these factors would lead to elevated blood glucose levels.    She is currently taking Metformin 500 mg twice daily to help with insulin resistance and support weight loss. Eri reports that she has tolerated this medication well so far and is interested in increasing the dosage to the next level.     4.  Hyperlipidemia.  Currently taking atorvastatin 20 mg daily  Tolerating well  Cholesterol remaining stable  Requesting refill     5.  Eri underwent bilateral knee replacement surgery performed by   Alfredo. The right total knee replacement (TKR) was completed in September 2023, and the left TKR was completed in December 2023. She has been following up with orthopedics and has been cleared for a follow-up in one year.    Presently, Eri is working with physical therapy with the hope of increasing her mobility and enhancing her overall activity level, particularly with her grandchildren. She expresses some frustration due to persistent swelling, pain, and redness in her left knee, but she is optimistic as she sees improvement with Tylenol.    Eri has been taking Tramadol for an extended period due to persistent pain, particularly when she turns in bed. She is considering the use of Meloxicam every morning and Tylenol 1000 mg every four hours throughout the day for pain management.    Postoperatively, Eri experienced significant nausea, for which she has been taking Zofran, Pantoprazole, and a Transderm scopolamine patch. She is uncertain if the nausea was due to Tramadol usage. However, this medication was recently discontinued from daily use by her orthopedic surgeon.    6.  History of BCC and SCC  She has not noticed any new lesions   Sees derm once a year and has appointment with them this summer    7.  Restless leg.  Eri presented today with reports of her legs constantly shaking while she is in bed, often leading her to get out of bed to settle down. She has been advised to consider taking magnesium supplements but has not yet tried this. Eri expressed concern that she may be suffering from Restless Leg Syndrome and is eager to improve her sleep quality.    Eri recently invested in an 8 Sleep mattress, which allows her to control the temperature on her side of the bed. Based on the sleep data from her phone michael that she showed me during the appointment, it appears that she experiences quality sleep 91% of the time. Despite this, the leg shaking continues to be an issue that disrupts her sleep.    Review of  Systems  All pertinent positive symptoms are included in the history of present illness.    All other systems have been reviewed and are negative and noncontributory to this patient's current ailments.    Past Medical History:   Diagnosis Date    Adjustment disorder with depressed mood     Complicated bereavement    Anxiety     Asthma     Basal cell carcinoma     skin cancer to back and chest    Encounter for screening for malignant neoplasm of vagina     Vaginal Pap smear    GERD (gastroesophageal reflux disease)     Hyperlipidemia     Hypertension     Major depressive disorder, recurrent, moderate (CMS/HCC) 03/23/2016    Major depressive disorder, recurrent episode, moderate with anxious distress    Metabolic syndrome     Insulin resistance    Other specified disorders of nose and nasal sinuses 03/27/2020    Sinus pressure    Personal history of other diseases of the respiratory system     History of allergic rhinitis    Personal history of other medical treatment     H/O mammogram    Personal history of other specified conditions 10/15/2019    History of abnormal mammogram    Radiculopathy, cervical region 05/10/2021    Cervical radicular pain    Radiculopathy, cervicothoracic region 05/10/2021    Radiculopathy of cervicothoracic region    Unspecified abnormal findings in urine 10/15/2019    Abnormal finding on urinalysis     Past Surgical History:   Procedure Laterality Date    BREAST SURGERY      right breast aspiration of cyst    CATARACT EXTRACTION Bilateral     KNEE ARTHROPLASTY Right     9/15/23    KNEE SURGERY  01/10/2014    left knee scope    PARTIAL HYSTERECTOMY      LSH    SALPINGOOPHORECTOMY Left      Social History     Tobacco Use    Smoking status: Never    Smokeless tobacco: Never   Vaping Use    Vaping Use: Never used   Substance Use Topics    Alcohol use: Never    Drug use: Never     Family History   Problem Relation Name Age of Onset    Depression Mother      Hyperlipidemia Mother       Hypertension Mother      Hyperthyroidism Mother      Stroke Mother      Diabetes type II Mother      Coronary artery disease Father      Other (coronary artery surgery) Father      Breast cancer Father's Sister       Immunization History   Administered Date(s) Administered    DTaP, Unspecified 12/09/2018    Flu vaccine (IIV4), preservative free *Check age/dose* 09/17/2016, 10/27/2023    Flu vaccine, quadrivalent, no egg protein, age 6 month or greater (FLUCELVAX) 09/24/2018    Influenza, Unspecified 11/15/2001, 11/04/2002, 10/14/2010, 09/09/2011, 09/20/2015, 10/06/2017, 09/20/2019, 09/04/2020, 09/21/2021, 10/31/2022    Novel influenza-H1N1-09, preservative-free 01/11/2010    Pfizer COVID-19 vaccine, bivalent, age 12 years and older (30 mcg/0.3 mL) 10/31/2022    Pfizer Purple Cap SARS-CoV-2 03/20/2021, 04/11/2021, 11/23/2021, 01/20/2024    Pneumococcal Conjugate PCV 7 1965    Tdap vaccine, age 7 year and older (BOOSTRIX, ADACEL) 10/15/2012, 12/09/2018    Zoster vaccine, recombinant, adult (SHINGRIX) 02/22/2023, 06/14/2023    Zoster, live 09/21/2016, 10/03/2016     Current Outpatient Medications   Medication Instructions    acetaminophen (TYLENOL) 975 mg, oral, Every 8 hours PRN    albuterol 90 mcg/actuation inhaler 1-2 puffs, inhalation, Every 4 -6 hours as needed    albuterol 2.5 mg, nebulization, Every 4-6 hours as needed for wheezing     atorvastatin (LIPITOR) 20 mg, oral, Daily    bimatoprost (Latisse) 0.03 % ophthalmic solution use as directed    escitalopram (LEXAPRO) 10 mg, oral, Daily    lisinopril 10 mg, oral, Daily, as directed    meloxicam (MOBIC) 15 mg, oral, Daily with breakfast    metFORMIN XR (GLUCOPHAGE-XR) 750 mg, oral, 2 times daily with meals    multivitamin tablet 1 tablet, oral, Daily    ondansetron ODT (ZOFRAN-ODT) 4 mg, oral, Every 8 hours PRN    pantoprazole (ProtoNix) 40 mg EC tablet Take 1 tablet (40 mg) by mouth once daily as needed for heartburn. Do not crush, chew, or split.     "scopolamine (Transderm-Scop) 1 mg over 3 days patch 3 day 1 patch, transdermal, Every 72 hours PRN     Allergies   Allergen Reactions    Codeine Rash and Nausea/vomiting    Sulfa (Sulfonamide Antibiotics) Rash    Sulfamethoxazole-Trimethoprim Rash       Objective   Vitals:    03/08/24 1037 03/08/24 1100   BP: 132/80 114/72   BP Location: Left arm    Patient Position: Sitting    BP Cuff Size: Adult    Pulse: 98    Weight: 82.6 kg (182 lb)    Height: 1.727 m (5' 8\")      Body mass index is 27.67 kg/m².    BP Readings from Last 3 Encounters:   03/08/24 114/72   12/31/23 140/68   12/30/23 134/78      Wt Readings from Last 3 Encounters:   03/08/24 82.6 kg (182 lb)   03/07/24 80.7 kg (178 lb)   12/29/23 80.9 kg (178 lb 5.6 oz)        Lab on 03/06/2024   Component Date Value    Cholesterol 03/06/2024 165     HDL-Cholesterol 03/06/2024 49.1     Cholesterol/HDL Ratio 03/06/2024 3.4     LDL Calculated 03/06/2024 88     VLDL 03/06/2024 28     Triglycerides 03/06/2024 139     Non HDL Cholesterol 03/06/2024 116     Thyroid Stimulating Horm* 03/06/2024 1.09     Glucose 03/06/2024 126 (H)     Sodium 03/06/2024 140     Potassium 03/06/2024 4.7     Chloride 03/06/2024 105     Bicarbonate 03/06/2024 28     Anion Gap 03/06/2024 12     Urea Nitrogen 03/06/2024 20     Creatinine 03/06/2024 0.68     eGFR 03/06/2024 >90     Calcium 03/06/2024 9.7     Albumin 03/06/2024 4.4     Alkaline Phosphatase 03/06/2024 138 (H)     Total Protein 03/06/2024 6.4     AST 03/06/2024 16     Bilirubin, Total 03/06/2024 0.8     ALT 03/06/2024 15     WBC 03/06/2024 4.4     nRBC 03/06/2024 0.0     RBC 03/06/2024 4.42     Hemoglobin 03/06/2024 12.9     Hematocrit 03/06/2024 39.8     MCV 03/06/2024 90     MCH 03/06/2024 29.2     MCHC 03/06/2024 32.4     RDW 03/06/2024 12.4     Platelets 03/06/2024 231     Neutrophils % 03/06/2024 58.6     Immature Granulocytes %,* 03/06/2024 0.2     Lymphocytes % 03/06/2024 32.1     Monocytes % 03/06/2024 5.9     Eosinophils % " 03/06/2024 2.3     Basophils % 03/06/2024 0.9     Neutrophils Absolute 03/06/2024 2.59     Immature Granulocytes Ab* 03/06/2024 0.01     Lymphocytes Absolute 03/06/2024 1.42     Monocytes Absolute 03/06/2024 0.26     Eosinophils Absolute 03/06/2024 0.10     Basophils Absolute 03/06/2024 0.04     HIV 1/2 Antigen/Antibody* 03/06/2024 Nonreactive     Hepatitis C AB 03/06/2024 Nonreactive      Physical Exam  CONSTITUTIONAL - well nourished, well developed, looks like stated age, in no acute distress, not ill-appearing, and not tired appearing  SKIN - normal skin color and pigmentation, normal skin turgor without rash, lesions, or nodules visualized  HEAD - no trauma, normocephalic  EYES - pupils are equal and reactive to light, extraocular muscles are intact, and normal external exam  ENT - TM's intact, no injection, no signs of infection, uvula midline, normal tongue movement and throat normal, no exudate  NECK - supple without rigidity, no neck mass was observed, no thyromegaly or thyroid nodules  CHEST - clear to auscultation, no wheezing, no crackles and no rales, good effort  CARDIAC - regular rate and regular rhythm, no skipped beats, no murmur  ABDOMEN - no organomegaly, soft, nontender, nondistended, normal bowel sounds, no guarding/rebound/rigidity, negative McBurney sign and negative Rae sign  EXTREMITIES - no obvious or evident edema, left knee with well-healed scar, some swelling, redness and warmth around the site of replacement, but no reproducible tenderness; right knee with well-healed scar, no swelling or redness  NEUROLOGICAL - normal gait, normal balance, normal motor, no ataxia, alert, oriented and no focal signs  PSYCHIATRIC - alert, pleasant and cordial, age-appropriate    Assessment/Plan   Problem List Items Addressed This Visit       Anxiety, generalized     Stable, no changes to medication recommended         Relevant Medications    escitalopram (Lexapro) 10 mg tablet    Benign essential  hypertension     Stable, no changes to medication recommended         Relevant Medications    lisinopril 10 mg tablet    Elevated alkaline phosphatase level     Alkaline phosphatase a bit high at 138 with normal below 110  I suspect this is secondary to the recent knee replacement surgery in the havoc that it plays with bone production of alkaline phosphatase         Prediabetes     Fasting glucose 122, will add hemoglobin A1c to blood already drawn         Relevant Medications    metFORMIN XR (Glucophage-XR) 750 mg 24 hr tablet    Other Relevant Orders    Hemoglobin A1C    Primary osteoarthritis of both knees     Glad to hear the tramadol is no longer part of the regimen  Continue meloxicam 15 mg in the a.m., Tylenol ES every 6 hours for breakthrough pain         Relevant Medications    meloxicam (Mobic) 15 mg tablet    Insulin resistance     Increase metformin ER to 750 mg twice daily  If you tolerate this, we will increase to 2000 mg daily         Relevant Medications    metFORMIN XR (Glucophage-XR) 750 mg 24 hr tablet    Other Relevant Orders    Hemoglobin A1C    Mixed hyperlipidemia     Stable, no changes to medication recommended         Relevant Medications    atorvastatin (Lipitor) 20 mg tablet    S/P total knee arthroplasty, right     Continue to follow with orthopedic surgery per protocol  Continue to follow with physical therapy per protocol         Asthma     Stable, no changes to medication recommended         GERD (gastroesophageal reflux disease)     Stable, no changes to medication recommended         S/P TKR (total knee replacement), left     Continue to follow with orthopedic surgery per protocol  Continue to follow with physical therapy per protocol         RLS (restless legs syndrome)     Recommended magnesium oxide 400 to 500 mg at bedtime  If no improvement, consider tonic water with quinine  If no improvement, consider follow-up in office to discuss pramipexole versus Requip         Physical  exam, annual - Primary     Complete history and physical examination was performed  EKG reveals sinus rhythm without acute changes  Blood work reviewed         Relevant Orders    ECG 12 lead (Clinic Performed) (Completed)

## 2024-03-08 NOTE — RESULT ENCOUNTER NOTE
Just to prove that you are not diabetic, I asked the lab to run a hemoglobin A1c off your blood work from your physical exam visit, and the level is 5.8% which means that this is not a diabetic state, but prediabetic state with probable insulin resistance

## 2024-03-08 NOTE — ASSESSMENT & PLAN NOTE
Glad to hear the tramadol is no longer part of the regimen  Continue meloxicam 15 mg in the a.m., Tylenol ES every 6 hours for breakthrough pain

## 2024-03-08 NOTE — ASSESSMENT & PLAN NOTE
Alkaline phosphatase a bit high at 138 with normal below 110  I suspect this is secondary to the recent knee replacement surgery in the havoc that it plays with bone production of alkaline phosphatase

## 2024-03-08 NOTE — ASSESSMENT & PLAN NOTE
Recommended magnesium oxide 400 to 500 mg at bedtime  If no improvement, consider tonic water with quinine  If no improvement, consider follow-up in office to discuss pramipexole versus Requip

## 2024-03-08 NOTE — ASSESSMENT & PLAN NOTE
Continue to follow with orthopedic surgery per protocol  Continue to follow with physical therapy per protocol

## 2024-03-08 NOTE — ASSESSMENT & PLAN NOTE
Increase metformin ER to 750 mg twice daily  If you tolerate this, we will increase to 2000 mg daily

## 2024-03-21 ENCOUNTER — APPOINTMENT (OUTPATIENT)
Dept: PHYSICAL THERAPY | Facility: CLINIC | Age: 59
End: 2024-03-21
Payer: COMMERCIAL

## 2024-03-26 ENCOUNTER — TREATMENT (OUTPATIENT)
Dept: PHYSICAL THERAPY | Facility: CLINIC | Age: 59
End: 2024-03-26
Payer: COMMERCIAL

## 2024-03-26 DIAGNOSIS — Z74.09 IMPAIRED FUNCTIONAL MOBILITY AND ACTIVITY TOLERANCE: ICD-10-CM

## 2024-03-26 DIAGNOSIS — Z96.652 S/P TKR (TOTAL KNEE REPLACEMENT), LEFT: ICD-10-CM

## 2024-03-26 PROCEDURE — 97110 THERAPEUTIC EXERCISES: CPT | Mod: GP

## 2024-03-26 NOTE — PROGRESS NOTES
Physical Therapy Treatment    Patient Name: Bree Bojorquez  MRN: [id number]  Today's Date: 3/26/2024  Diagnosis:   1. S/P TKR (total knee replacement), left  Follow Up In Physical Therapy      2. Impaired functional mobility and activity tolerance  Follow Up In Physical Therapy        Visit # 9    PRECAUTIONS:  Right TKA 2023    SUBJECTIVE:  Has returned after vacation and then being ill. Knee feels ok, still swells at times. Stiff.     OBJECTIVE:  Ambulation WFL with mild forward lean, able to correct with cues.   Right 0-119 left 0-117    TREATMENT:  - Therex:  Stair stretches - hip flex, hamstring, gastroc 3 x 20 sec kamla     Bike level 2 6 min.   TG 2 x 10 stop at level 5    Dynamic step activity for gait training     X 20:  Stair taps  Step ups 4 inch  CR  Hip abd   Hip extension   Marches 1.5#  Cone step over lateral, anterior posterior   Slider eccentric loaded LLE  Multi plane tap down LLE stance     Seated:  LAQ 3#    Supine x 20 kamla:  SAQ 3#  S/l hip abduction   Bridge with green TB abd    NBOS activity     - - Modalities:    CP x 10 min seated     ASSESSMENT:  Pt able to progress with current program increasing in adherence to technique. Pt with fair response to balance work, cued for remaining upright as well as focal point. Challenged with balance activity. Able to work toward mod indp with eccentric loading.     PLAN:    Continue per POC for TKA protocol.

## 2024-03-28 ENCOUNTER — APPOINTMENT (OUTPATIENT)
Dept: PHYSICAL THERAPY | Facility: CLINIC | Age: 59
End: 2024-03-28
Payer: COMMERCIAL

## 2024-04-01 ENCOUNTER — TREATMENT (OUTPATIENT)
Dept: PHYSICAL THERAPY | Facility: CLINIC | Age: 59
End: 2024-04-01
Payer: COMMERCIAL

## 2024-04-01 DIAGNOSIS — Z96.652 S/P TKR (TOTAL KNEE REPLACEMENT), LEFT: Primary | ICD-10-CM

## 2024-04-01 DIAGNOSIS — Z74.09 IMPAIRED FUNCTIONAL MOBILITY AND ACTIVITY TOLERANCE: ICD-10-CM

## 2024-04-01 PROCEDURE — 97110 THERAPEUTIC EXERCISES: CPT | Mod: GP

## 2024-04-01 NOTE — PROGRESS NOTES
Physical Therapy Treatment    Patient Name: Bree Bojorquez  MRN: [id number]  Today's Date: 4/1/2024  Diagnosis:   1. S/P TKR (total knee replacement), left        2. Impaired functional mobility and activity tolerance          Visit # 10    PRECAUTIONS:  Right TKA 2023    SUBJECTIVE:  Is doing well. Feels she is doing better on stairs and walking. Notes she can sometimes bends forward.     OBJECTIVE:    TREATMENT:  - Therex:  Stair stretches - hip flex, hamstring, gastroc 3 x 20 sec kamla     Bike level 2 6 min.   TG 2 x 10 stop at level 5    Dynamic step activity for gait training     X 20:  Stair taps  Step ups 4 inch  CR  Hip abd   Hip extension   Marches 1.5#  Cone step over lateral, anterior posterior   Slider eccentric loaded LLE  Multi plane tap down LLE stance   Y balance style lunges     Seated:  LAQ 4#    Supine x 20 kamla:  SAQ 4#  S/l hip abduction   Bridge with green TB abd    NBOS activity     - - Modalities:    CP x 10 min seated     ASSESSMENT:  Pt with good rylee of dynamic exercise progression. Wall squats and LAQ with good technique and tolerance. Focused on technique and upright posture with gait and exercise. Tendency to forward flex. Challenged with SLS activity. Progressing with rylee of extension exercise for eccentric quad control. Improved.     PLAN:    Continue per POC for TKA protocol.

## 2024-04-10 ENCOUNTER — TREATMENT (OUTPATIENT)
Dept: PHYSICAL THERAPY | Facility: CLINIC | Age: 59
End: 2024-04-10
Payer: COMMERCIAL

## 2024-04-10 DIAGNOSIS — Z74.09 IMPAIRED FUNCTIONAL MOBILITY AND ACTIVITY TOLERANCE: ICD-10-CM

## 2024-04-10 DIAGNOSIS — Z96.652 S/P TKR (TOTAL KNEE REPLACEMENT), LEFT: Primary | ICD-10-CM

## 2024-04-10 PROCEDURE — 97110 THERAPEUTIC EXERCISES: CPT | Mod: GP

## 2024-04-10 NOTE — PROGRESS NOTES
Physical Therapy Treatment    Patient Name: Bree Bojorquez  MRN: [id number]  Today's Date: 4/10/2024  Diagnosis:   1. S/P TKR (total knee replacement), left        2. Impaired functional mobility and activity tolerance          Visit # 11    PRECAUTIONS:  Right TKA 2023    SUBJECTIVE:      OBJECTIVE:    TREATMENT:  - Therex:  Stair stretches - hip flex, hamstring, gastroc 3 x 20 sec kamla     Bike level 2 6 min.   TG 2 x 10 stop at level 5    Dynamic step activity for gait training     X 20:  Stair taps  Step ups 4 inch  CR  Hip abd   Hip extension     Cone step over lateral, anterior posterior   Slider eccentric loaded LLE  Multi plane tap down LLE stance   Y balance style lunges     Wall squats   NBOS activity     - - Modalities:    CP x 10 min seated     ASSESSMENT:  Pt with good rylee of dynamic activity focused on eccentric loading of the LLE. Pt challenged with not using her UE for assistance. Pt given hip flexor stretch supine. Gait cues for upright posture and not flexing forward at the hip while walking. Pt challenged with balance activity when unable to hold on. Pt is progressing well with ROM strength improving.     PLAN:    Continue per POC for TKA protocol.

## 2024-04-13 DIAGNOSIS — I10 BENIGN ESSENTIAL HYPERTENSION: ICD-10-CM

## 2024-04-15 RX ORDER — LISINOPRIL 10 MG/1
10 TABLET ORAL DAILY
Qty: 90 TABLET | Refills: 0 | Status: SHIPPED | OUTPATIENT
Start: 2024-04-15 | End: 2024-07-14

## 2024-04-23 ENCOUNTER — APPOINTMENT (OUTPATIENT)
Dept: PHYSICAL THERAPY | Facility: CLINIC | Age: 59
End: 2024-04-23
Payer: COMMERCIAL

## 2024-05-07 ENCOUNTER — TREATMENT (OUTPATIENT)
Dept: PHYSICAL THERAPY | Facility: CLINIC | Age: 59
End: 2024-05-07
Payer: COMMERCIAL

## 2024-05-07 DIAGNOSIS — Z96.652 S/P TKR (TOTAL KNEE REPLACEMENT), LEFT: Primary | ICD-10-CM

## 2024-05-07 DIAGNOSIS — Z74.09 IMPAIRED FUNCTIONAL MOBILITY AND ACTIVITY TOLERANCE: ICD-10-CM

## 2024-05-07 PROCEDURE — 97530 THERAPEUTIC ACTIVITIES: CPT | Mod: GP

## 2024-05-07 NOTE — PROGRESS NOTES
Physical Therapy Treatment    Patient Name: Bree Bojorquez  MRN: [id number]  Today's Date: 5/7/2024  Diagnosis:   1. S/P TKR (total knee replacement), left        2. Impaired functional mobility and activity tolerance          Visit # 12    PRECAUTIONS:  Right TKA 2023    SUBJECTIVE:  Pt feels she is doing good. Has questions on her strength and what to expect. Is walking more and feels she is staying more upright. Pain is not an issue as much as feeling stiff. Hard to get up off the floor.     OBJECTIVE:  Right knee 0-120 quad lag 10  Left knee 0-112 quad lag 14    Hip flex 4+/5 kamla  Hip abd 4/5 kamla  Hip add 5/5 kamla, hip ext 4+/5 kamla  Hamstring 4+/5 kamla  Quad 4+/5 kamla   DF PF 4+/5 kamla     SLS 10 sec kamla  NBOS 10 sec kamla     Ambulation INDP with good technique. Mild deficit LLE due to pronation of the left foot. Stance phase of gait WFL     TREATMENT:  - Education:   HEP below  Aquatics  Continued exercise   Focus on gait     ASSESSMENT:  Pt has achieved all goals with good rylee of functional Activity. INDP in all activity. Limited in kneeling. Pt educated in importance of continued exercise. Pt cued for arm swing during gait. Pt with good progression thru PT.       PLAN:   Discharge to [facility].     Access Code: [email or web address]  URL: [email or web address]  Date: 05/07/2024  Prepared by: Vidya Sánchez    Exercises  - Squat  - 1 x daily - 7 x weekly - 3 sets - 10 reps  - Side Stepping  - 1 x daily - 7 x weekly - 3 sets - 10 reps  - Backward Walking  - 1 x daily - 7 x weekly - 3 sets - 10 reps  - Forward Walking  - 1 x daily - 7 x weekly - 3 sets - 10 reps  - Forward March  - 1 x daily - 7 x weekly - 3 sets - 10 reps  - Standing Hip Abduction Adduction at Pool Wall  - 1 x daily - 7 x weekly - 3 sets - 10 reps  - Alternating Forward Lunge  - 1 x daily - 7 x weekly - 3 sets - 10 reps  - Single Leg Stance  - 1 x daily - 7 x weekly - 3 sets - 10 reps  - Side Step Over in [place]  - 1 x daily - 7 x weekly  - 3 sets - 10 reps  - Forward and Backward Step Over in [place]  - 1 x daily - 7 x weekly - 3 sets - 10 reps

## 2024-05-22 ENCOUNTER — OFFICE VISIT (OUTPATIENT)
Dept: DERMATOLOGY | Facility: CLINIC | Age: 59
End: 2024-05-22
Payer: COMMERCIAL

## 2024-05-22 DIAGNOSIS — Z85.89 HISTORY OF SQUAMOUS CELL CARCINOMA: ICD-10-CM

## 2024-05-22 DIAGNOSIS — D22.9 MULTIPLE BENIGN MELANOCYTIC NEVI: ICD-10-CM

## 2024-05-22 DIAGNOSIS — Z85.828 HISTORY OF BASAL CELL CARCINOMA: ICD-10-CM

## 2024-05-22 DIAGNOSIS — L90.5 SCAR CONDITIONS AND FIBROSIS OF SKIN: ICD-10-CM

## 2024-05-22 DIAGNOSIS — L82.1 SEBORRHEIC KERATOSES: ICD-10-CM

## 2024-05-22 DIAGNOSIS — L71.9 ROSACEA: ICD-10-CM

## 2024-05-22 DIAGNOSIS — D18.01 CHERRY ANGIOMA: ICD-10-CM

## 2024-05-22 DIAGNOSIS — L57.0 ACTINIC KERATOSIS: Primary | ICD-10-CM

## 2024-05-22 DIAGNOSIS — Z12.83 ENCOUNTER FOR SCREENING FOR MALIGNANT NEOPLASM OF SKIN: ICD-10-CM

## 2024-05-22 PROCEDURE — 1036F TOBACCO NON-USER: CPT | Performed by: DERMATOLOGY

## 2024-05-22 PROCEDURE — 17000 DESTRUCT PREMALG LESION: CPT | Performed by: DERMATOLOGY

## 2024-05-22 PROCEDURE — 99213 OFFICE O/P EST LOW 20 MIN: CPT | Performed by: DERMATOLOGY

## 2024-05-22 ASSESSMENT — DERMATOLOGY QUALITY OF LIFE (QOL) ASSESSMENT
DATE THE QUALITY-OF-LIFE ASSESSMENT WAS COMPLETED: 66982
RATE HOW EMOTIONALLY BOTHERED YOU ARE BY YOUR SKIN PROBLEM (FOR EXAMPLE, WORRY, EMBARRASSMENT, FRUSTRATION): 0 - NEVER BOTHERED
RATE HOW BOTHERED YOU ARE BY SYMPTOMS OF YOUR SKIN PROBLEM (EG, ITCHING, STINGING BURNING, HURTING OR SKIN IRRITATION): 0 - NEVER BOTHERED
ARE THERE EXCLUSIONS OR EXCEPTIONS FOR THE QUALITY OF LIFE ASSESSMENT: NO
RATE HOW BOTHERED YOU ARE BY EFFECTS OF YOUR SKIN PROBLEMS ON YOUR ACTIVITIES (EG, GOING OUT, ACCOMPLISHING WHAT YOU WANT, WORK ACTIVITIES OR YOUR RELATIONSHIPS WITH OTHERS): 0 - NEVER BOTHERED

## 2024-05-22 ASSESSMENT — DERMATOLOGY PATIENT ASSESSMENT
DO YOU USE SUNSCREEN: DAILY
ARE YOU TRYING TO GET PREGNANT: NO
HAVE YOU HAD OR DO YOU HAVE VASCULAR DISEASE: NO
ARE YOU AN ORGAN TRANSPLANT RECIPIENT: NO
DO YOU HAVE ANY NEW OR CHANGING LESIONS: NO
DO YOU USE A TANNING BED: YES, PREVIOUSLY
ARE YOU ON BIRTH CONTROL: NO
HAVE YOU HAD OR DO YOU HAVE A STAPH INFECTION: NO
DO YOU HAVE IRREGULAR MENSTRUAL CYCLES: NO

## 2024-05-22 ASSESSMENT — ITCH NUMERIC RATING SCALE: HOW SEVERE IS YOUR ITCHING?: 0

## 2024-05-22 ASSESSMENT — PATIENT GLOBAL ASSESSMENT (PGA): PATIENT GLOBAL ASSESSMENT: PATIENT GLOBAL ASSESSMENT:  1 - CLEAR

## 2024-05-22 NOTE — PROGRESS NOTES
Subjective     Bree Bojorquez is a 58 y.o. female who presents for the following: Skin Check (History of BCC and SCC.  First visit with Dr. Frey.).     LOV 2022 with Dr. Dobbs.     Review of Systems:  No other skin or systemic complaints other than what is documented elsewhere in the note.    The following portions of the chart were reviewed this encounter and updated as appropriate:       Specialty Problems          Dermatology Problems    Actinic keratosis    Bacterial folliculitis    Hemangioma of skin and subcutaneous tissue    History of basal cell carcinoma     Reported h/o basal cell carcinoma left upper back treated with Dr. Oviedo 2013         Hypertrophic scar    Lichen simplex chronicus    Melanocytic nevi of scalp and neck    Melanocytic nevi of trunk    Melanocytic nevi of unspecified lower limb, including hip    Melanocytic nevi of unspecified upper limb, including shoulder    Other melanin hyperpigmentation    Other seborrheic keratosis    Other specified follicular disorders    Prurigo nodularis    Rosacea, unspecified    Skin changes due to chronic exposure to nonionizing radiation, unspecified    Eyelash scantiness     Past Medical History:  Bree Bojorquez  has a past medical history of Adjustment disorder with depressed mood, Anxiety, Asthma ([facility]), Basal cell carcinoma, Encounter for screening for malignant neoplasm of vagina, GERD (gastroesophageal reflux disease), Hyperlipidemia, Hypertension, Major depressive disorder, recurrent, moderate (Multi) (03/23/2016), Metabolic syndrome, Other specified disorders of nose and nasal sinuses (03/27/2020), Personal history of other diseases of the respiratory system, Personal history of other medical treatment, Personal history of other specified conditions (10/15/2019), Radiculopathy, cervical region (05/10/2021), Radiculopathy, cervicothoracic region (05/10/2021), and Unspecified abnormal findings in urine (10/15/2019).    Past Surgical  History:  Bree Bojorquez  has a past surgical history that includes Knee surgery (01/10/2014); Partial hysterectomy; Salpingoophorectomy (Left); Knee Arthroplasty (Right); Cataract extraction (Bilateral); and Breast surgery.    Family History:  Patient family history includes Breast cancer in her father's sister; Coronary artery disease in her father; Depression in her mother; Diabetes type II in her mother; Hyperlipidemia in her mother; Hypertension in her mother; Hyperthyroidism in her mother; Stroke in her mother; coronary artery surgery in her father.    Social History:  Bree Bojorquez  reports that she has never smoked. She has never used smokeless tobacco. She reports that she does not drink alcohol and does not use drugs.    Allergies:  Codeine, Sulfa (sulfonamide antibiotics), and Sulfamethoxazole-trimethoprim    Current Medications / CAM's:    Current Outpatient Medications:     acetaminophen (Tylenol) 325 mg tablet, Take 3 tablets (975 mg) by mouth every 8 hours if needed for mild pain (1 - 3)., Disp: 90 tablet, Rfl: 1    albuterol 2.5 mg /3 mL (0.083 %) nebulizer solution, Take 3 mL (2.5 mg) by nebulization. Every 4-6 hours as needed for wheezing, Disp: , Rfl:     albuterol 90 mcg/actuation inhaler, Inhale 1-2 puffs. Every 4 -6 hours as needed, Disp: , Rfl:     atorvastatin (Lipitor) 20 mg tablet, Take 1 tablet (20 mg) by mouth once daily., Disp: 90 tablet, Rfl: 0    bimatoprost (Latisse) 0.03 % ophthalmic solution, use as directed, Disp: 3 mL, Rfl: 5    escitalopram (Lexapro) 10 mg tablet, Take 1 tablet (10 mg) by mouth once daily., Disp: 90 tablet, Rfl: 0    lisinopril 10 mg tablet, Take 1 tablet (10 mg) by mouth once daily. as directed, Disp: 90 tablet, Rfl: 0    meloxicam (Mobic) 15 mg tablet, Take 1 tablet (15 mg) by mouth once daily with breakfast., Disp: 90 tablet, Rfl: 1    metFORMIN XR (Glucophage-XR) 750 mg 24 hr tablet, Take 1 tablet (750 mg) by mouth 2 times a day with meals., Disp:  180 tablet, Rfl: 0    multivitamin tablet, Take 1 tablet by mouth once daily., Disp: , Rfl:     ondansetron ODT (Zofran-ODT) 4 mg disintegrating tablet, Take 1 tablet (4 mg) by mouth every 8 hours if needed for nausea or vomiting., Disp: 20 tablet, Rfl: 1    pantoprazole (ProtoNix) 40 mg EC tablet, Take 1 tablet (40 mg) by mouth once daily as needed for heartburn. Do not crush, chew, or split., Disp: 30 tablet, Rfl: 0     Objective   Well appearing patient in no apparent distress; mood and affect are within normal limits.    A full examination was performed including scalp, head, eyes, ears, nose, lips, neck, chest, axillae, abdomen, back, buttocks, bilateral upper extremities, bilateral lower extremities, hands, feet, fingers, toes, fingernails, and toenails. All findings within normal limits unless otherwise noted below. Patient declined genital and gluteal cleft exam.  Patient has finger and toenail polish in place.       - scattered regular brown macules and papules    - Scattered waxy tan/grey/brown papules with horn cysts    - scattered small bright red papules and macules    - Well healed scar at prior treatment sites without visual or palpable evidence of recurrence.     Right Malar Cheek  Erythematous macules with gritty scale.    Head - Anterior (Face)  Central facial erythema and extensive fine telangiectasias         Assessment/Plan   Actinic keratosis  Right Malar Cheek    - The premalignant nature of the disorder was reviewed and treatment options were reviewed.   - Patient agreeable to treatment with cryotherapy today.  Sites confirmed. Risks and benefits reviewed including but not limited to pain, redness, swelling, blister, scab, healing with hypo or hyperpigmentation, and scar. Chance of recurrence or persistence reviewed.      Destr of lesion - Right Malar Cheek  Complexity: simple    Destruction method: cryotherapy    Informed consent: discussed and consent obtained    Lesion destroyed using  liquid nitrogen: Yes    Cryotherapy cycles:  1  Outcome: patient tolerated procedure well with no complications    Post-procedure details: wound care instructions given      Rosacea  Head - Anterior (Face)    Erythromatotelangiectatic rosacea + photodamage  - nature reviewed, no papulopustular component  - reviewed options of VBEAM (cosmetic cost) and creams (mirvaso/rhofade- costly, temporary)  - she opts to continue strict UV protection and consider camoflauge with  green tinted make-ups (elf, clinique- written in AVS).     Multiple benign melanocytic nevi    Benign melanocytic nevi  - Discussed benign nature and that no treatment is necessary unless it becomes painful or increases in size. Patient opts for clinical monitoring at this time.    - Sun protective behavior reviewed and encouraged including the use of over-the-counter sunscreen with SPF30+ daily (reapply every 1.5 hours when outdoors), UPF clothing, broad rimmed hats, sunglasses, and avoidance of midday sun. Home skin monitoring encouraged and how to monitor for skin cancer (changing or new moles, new rapidly growing or non-healing lesions) reviewed. Patient encouraged to call with interval concerns or changes.      Seborrheic keratoses    Seborrheic keratosis (-es)  - Discussed benign nature and that no treatment is necessary unless it becomes painful or increases in size. Patient opts for clinical monitoring at this time.      Cherry angioma    Cherry angioma(s)  - Discussed benign nature and that no treatment is necessary unless it becomes painful or increases in size. Patient opts for clinical monitoring at this time.      Scar conditions and fibrosis of skin    - Well healed scar(s) at sites of prior skin cancer -   - Sun protective behavior reviewed and encouraged including the use of over-the-counter sunscreen with SPF30+ daily (reapply every 1.5 hours when outdoors), UPF clothing, broad rimmed hats, sunglasses, and avoidance of  midday sun. Home skin monitoring encouraged and how to monitor for skin cancer (changing or new moles, new rapidly growing or non-healing lesions) reviewed. Patient encouraged to call with interval concerns or changes.       History of squamous cell carcinoma    Related Procedures  Follow Up In Dermatology - Established Patient    History of basal cell carcinoma    Encounter for screening for malignant neoplasm of skin       Fuv 1 year SULTANA Frey MD

## 2024-05-22 NOTE — PATIENT INSTRUCTIONS
For gentle sun screens    - Vanicream line   - Blue Lizard line    For  foundations  - Elf  (green camo  and green tone adjusting face primer, can get at [employer])  - Clinique has a redness line

## 2024-06-04 DIAGNOSIS — E78.2 MIXED HYPERLIPIDEMIA: ICD-10-CM

## 2024-06-04 RX ORDER — ATORVASTATIN CALCIUM 20 MG/1
20 TABLET, FILM COATED ORAL DAILY
Qty: 90 TABLET | Refills: 0 | Status: SHIPPED | OUTPATIENT
Start: 2024-06-04 | End: 2024-09-02

## 2024-06-09 DIAGNOSIS — R73.03 PREDIABETES: ICD-10-CM

## 2024-06-09 DIAGNOSIS — E88.819 INSULIN RESISTANCE: ICD-10-CM

## 2024-06-12 RX ORDER — METFORMIN HYDROCHLORIDE 750 MG/1
750 TABLET, EXTENDED RELEASE ORAL
Qty: 180 TABLET | Refills: 1 | Status: SHIPPED | OUTPATIENT
Start: 2024-06-12 | End: 2024-12-09

## 2024-07-08 DIAGNOSIS — M79.641 HAND PAIN, RIGHT: Primary | ICD-10-CM

## 2024-07-09 ENCOUNTER — HOSPITAL ENCOUNTER (OUTPATIENT)
Dept: RADIOLOGY | Facility: HOSPITAL | Age: 59
Discharge: HOME | End: 2024-07-09
Payer: COMMERCIAL

## 2024-07-09 ENCOUNTER — OFFICE VISIT (OUTPATIENT)
Dept: ORTHOPEDIC SURGERY | Facility: HOSPITAL | Age: 59
End: 2024-07-09
Payer: COMMERCIAL

## 2024-07-09 VITALS — BODY MASS INDEX: 27.58 KG/M2 | WEIGHT: 182 LBS | HEIGHT: 68 IN

## 2024-07-09 DIAGNOSIS — M65.4 RADIAL STYLOID TENOSYNOVITIS: Primary | ICD-10-CM

## 2024-07-09 DIAGNOSIS — M79.641 HAND PAIN, RIGHT: ICD-10-CM

## 2024-07-09 PROCEDURE — 99213 OFFICE O/P EST LOW 20 MIN: CPT | Mod: 25 | Performed by: ORTHOPAEDIC SURGERY

## 2024-07-09 PROCEDURE — 73130 X-RAY EXAM OF HAND: CPT | Mod: RT

## 2024-07-09 PROCEDURE — 1036F TOBACCO NON-USER: CPT | Performed by: ORTHOPAEDIC SURGERY

## 2024-07-09 PROCEDURE — 2500000005 HC RX 250 GENERAL PHARMACY W/O HCPCS: Performed by: ORTHOPAEDIC SURGERY

## 2024-07-09 PROCEDURE — 20550 NJX 1 TENDON SHEATH/LIGAMENT: CPT | Mod: RT | Performed by: ORTHOPAEDIC SURGERY

## 2024-07-09 PROCEDURE — 99213 OFFICE O/P EST LOW 20 MIN: CPT | Performed by: ORTHOPAEDIC SURGERY

## 2024-07-09 PROCEDURE — 2500000004 HC RX 250 GENERAL PHARMACY W/ HCPCS (ALT 636 FOR OP/ED): Performed by: ORTHOPAEDIC SURGERY

## 2024-07-09 PROCEDURE — 73130 X-RAY EXAM OF HAND: CPT | Mod: RIGHT SIDE | Performed by: RADIOLOGY

## 2024-07-09 RX ORDER — LIDOCAINE HYDROCHLORIDE 10 MG/ML
0.5 INJECTION INFILTRATION; PERINEURAL
Status: COMPLETED | OUTPATIENT
Start: 2024-07-09 | End: 2024-07-09

## 2024-07-09 RX ORDER — TRIAMCINOLONE ACETONIDE 40 MG/ML
20 INJECTION, SUSPENSION INTRA-ARTICULAR; INTRAMUSCULAR
Status: COMPLETED | OUTPATIENT
Start: 2024-07-09 | End: 2024-07-09

## 2024-07-09 ASSESSMENT — PAIN - FUNCTIONAL ASSESSMENT: PAIN_FUNCTIONAL_ASSESSMENT: 0-10

## 2024-07-09 ASSESSMENT — PAIN SCALES - GENERAL: PAINLEVEL_OUTOF10: 5 - MODERATE PAIN

## 2024-07-09 ASSESSMENT — PAIN DESCRIPTION - DESCRIPTORS: DESCRIPTORS: ACHING;SORE

## 2024-07-09 NOTE — PROGRESS NOTES
[facility]  Hand and Upper Extremity Service  Follow up visit         New Problem: Right hand pain     Interval History: Last seen for right hand cramping that resolved since last visit for intermittent cramping of right finger. She indicates she still experiences this symptoms occasionally but her main concern is right hand pain at the base of thumb and radial aspect of wrist that's been present for several months. The symptoms initially were only at night but now they happen throughout the day. She denies any numbness, tingling, or other sensory disturbances. She reports that her symptoms are aggravated by driving and cause tightness and sticking within the wrist. She also reports clicking in her fingers. Still taking meloxicam for knee pain.               Past medical history, medications, allergies, surgical history and review of systems are reviewed and otherwise unchanged when compared to last visit on 5/31/22         Examination:  Constitutional: Oriented to person, place, and time.  Appears well-developed and well-nourished.  Head: Normocephalic and atraumatic.  Eyes: Pupils are equal, round, and reactive to light.  Cardiovascular: Intact distal pulses.  Pulmonary/Chest/Breast: Effort normal. No respiratory distress.  Neurological: Alert and oriented to person, place, and time.  Skin: Skin is warm and dry.  Psychiatric: normal mood and affect.  Behavior is normal.  Musculoskeletal: Right hand reveals mild swelling over radial styloid. Tenderness to palpation over first dorsal compartment. Mild tenderness over thumb CMC joint. Markedly positive Finkelstein maneuver. No evidence of triggering of thumb, index, or any finger of the right hand. Sensation is subjectively normal.       Personal Interpretation of Diagnostic studies: Xrays of right wrist taken today demonstrate some mild arthritic changes at base of thumb.        Impression: Right wrist De Quervain's tenosynovitis        Plan: We've discussed this diagnosis and conservative treatment options including bracing and injections, anti-inflammatory medications, and activity modifications. She's already on Mobic for her knees. She'll purchase her own thumb Spica splint and received a right wrist first dorsal compartment injection.       In Office Procedures Performed: Right first dorsal compartment injection   Hand / UE Inj/Asp: R extensor compartment 1 for de Quervain's tenosynovitis on 7/9/2024 3:18 PM  Indications: tendon swelling and pain  Details: 25 G needle, radial approach  Medications: 20 mg triamcinolone acetonide 40 mg/mL; 0.5 mL lidocaine 10 mg/mL (1 %)  Outcome: tolerated well, no immediate complications  Procedure, treatment alternatives, risks and benefits explained, specific risks discussed. Immediately prior to procedure a time out was called to verify the correct patient, procedure, equipment, support staff and site/side marked as required. Patient was prepped and draped in the usual sterile fashion.             Follow up: As needed              Ziggy De León MD  [facility]  Department of Orthopaedic Surgery  Hand and Upper Extremity Reconstruction    Scribe Attestation  By signing my name below, I, Donald Mello , Scribaileen   attest that this documentation has been prepared under the direction and in the presence of Dr. Ziggy De León.    Dictation performed with the use of voice recognition software.  Syntax and grammatical errors may exist.

## 2024-07-24 DIAGNOSIS — I10 BENIGN ESSENTIAL HYPERTENSION: ICD-10-CM

## 2024-07-24 RX ORDER — LISINOPRIL 10 MG/1
10 TABLET ORAL DAILY
Qty: 90 TABLET | Refills: 0 | Status: SHIPPED | OUTPATIENT
Start: 2024-07-24 | End: 2024-10-22

## 2024-08-28 DIAGNOSIS — J45.909 ASTHMA, UNSPECIFIED ASTHMA SEVERITY, UNSPECIFIED WHETHER COMPLICATED, UNSPECIFIED WHETHER PERSISTENT (HHS-HCC): ICD-10-CM

## 2024-08-28 DIAGNOSIS — M17.0 PRIMARY OSTEOARTHRITIS OF BOTH KNEES: ICD-10-CM

## 2024-08-28 RX ORDER — MELOXICAM 15 MG/1
15 TABLET ORAL
Qty: 30 TABLET | Refills: 5 | OUTPATIENT
Start: 2024-08-28 | End: 2025-02-24

## 2024-09-02 DIAGNOSIS — E78.2 MIXED HYPERLIPIDEMIA: ICD-10-CM

## 2024-09-03 RX ORDER — ATORVASTATIN CALCIUM 20 MG/1
20 TABLET, FILM COATED ORAL DAILY
Qty: 90 TABLET | Refills: 0 | Status: SHIPPED | OUTPATIENT
Start: 2024-09-03 | End: 2024-12-02

## 2024-09-09 RX ORDER — ALBUTEROL SULFATE 90 UG/1
1-2 INHALANT RESPIRATORY (INHALATION) AS NEEDED
Qty: 18 G | Refills: 3 | Status: SHIPPED | OUTPATIENT
Start: 2024-09-09

## 2024-09-09 RX ORDER — ALBUTEROL SULFATE 0.83 MG/ML
2.5 SOLUTION RESPIRATORY (INHALATION) AS NEEDED
Qty: 75 ML | Refills: 1 | Status: SHIPPED | OUTPATIENT
Start: 2024-09-09

## 2024-09-18 ENCOUNTER — TELEMEDICINE (OUTPATIENT)
Dept: PRIMARY CARE | Facility: CLINIC | Age: 59
End: 2024-09-18
Payer: COMMERCIAL

## 2024-09-18 DIAGNOSIS — J40 SINOBRONCHITIS: Primary | ICD-10-CM

## 2024-09-18 DIAGNOSIS — J32.9 SINOBRONCHITIS: Primary | ICD-10-CM

## 2024-09-18 PROCEDURE — 99214 OFFICE O/P EST MOD 30 MIN: CPT | Performed by: FAMILY MEDICINE

## 2024-09-18 PROCEDURE — 1036F TOBACCO NON-USER: CPT | Performed by: FAMILY MEDICINE

## 2024-09-18 RX ORDER — DOXYCYCLINE 100 MG/1
100 CAPSULE ORAL 2 TIMES DAILY
Qty: 14 CAPSULE | Refills: 0 | Status: SHIPPED | OUTPATIENT
Start: 2024-09-18 | End: 2024-09-25

## 2024-09-18 RX ORDER — METHYLPREDNISOLONE 4 MG/1
TABLET ORAL
Qty: 21 TABLET | Refills: 0 | Status: SHIPPED | OUTPATIENT
Start: 2024-09-18

## 2024-09-18 NOTE — PROGRESS NOTES
Subjective   Patient ID: Bree Bojorquez is a 58 y.o. female who presents virtually for Sinusitis.    Past Medical, Surgical, and Family History reviewed and updated in chart.    Reviewed all medications by prescribing practitioner or clinical pharmacist (such as prescriptions, OTCs, herbal therapies and supplements) and documented in the medical record.    ELISA Hwang is visiting today due to experiencing symptoms for about a week. Initially, she thought these symptoms were allergy-related, but they have progressively worsened. She reports sinus pain and pressure extending from her cheeks to the frontal sinus area, fatigue, and difficulty sleeping. Last night, she developed a fever. Additional symptoms include nasal congestion and postnasal drip. She is concerned that her condition might be developing into bronchitis or pneumonia.    Last week, she called for an albuterol inhaler, which she has been using with minimal benefit. To help with sleep, she has been taking NyQuil, which has provided some relief. Eri is planning a trip to [place] with her family next week and is hoping to recover before traveling.    She has tested herself for COVID-19 twice, and both tests returned negative.    Review of Systems  All pertinent positive symptoms are included in the history of present illness.    All other systems have been reviewed and are negative and noncontributory to this patient's current ailments.    Past Medical History:   Diagnosis Date    Adjustment disorder with depressed mood     Complicated bereavement    Anxiety     Asthma ([facility])     Basal cell carcinoma     skin cancer to back and chest    Encounter for screening for malignant neoplasm of vagina     Vaginal Pap smear    GERD (gastroesophageal reflux disease)     Hyperlipidemia     Hypertension     Major depressive disorder, recurrent, moderate (Multi) 03/23/2016    Major depressive disorder, recurrent episode, moderate with anxious distress    Metabolic  syndrome     Insulin resistance    Other specified disorders of nose and nasal sinuses 03/27/2020    Sinus pressure    Personal history of other diseases of the respiratory system     History of allergic rhinitis    Personal history of other medical treatment     H/O mammogram    Personal history of other specified conditions 10/15/2019    History of abnormal mammogram    Radiculopathy, cervical region 05/10/2021    Cervical radicular pain    Radiculopathy, cervicothoracic region 05/10/2021    Radiculopathy of cervicothoracic region    Unspecified abnormal findings in urine 10/15/2019    Abnormal finding on urinalysis     Social History     Tobacco Use    Smoking status: Never    Smokeless tobacco: Never   Vaping Use    Vaping status: Never Used   Substance Use Topics    Alcohol use: Never    Drug use: Never     Current Outpatient Medications   Medication Instructions    acetaminophen (TYLENOL) 975 mg, oral, Every 8 hours PRN    albuterol 90 mcg/actuation inhaler 1-2 puffs, inhalation, As needed, Every 4 -6 hours as needed    albuterol 2.5 mg, nebulization, As needed, Every 4-6 hours as needed for wheezing    atorvastatin (LIPITOR) 20 mg, oral, Daily    bimatoprost (Latisse) 0.03 % ophthalmic solution use as directed    doxycycline (VIBRAMYCIN) 100 mg, oral, 2 times daily, Take with at least 8 ounces (large glass) of water, do not lie down for 30 minutes after    escitalopram (LEXAPRO) 10 mg, oral, Daily    lisinopril 10 mg, oral, Daily, as directed    metFORMIN XR (GLUCOPHAGE-XR) 750 mg, oral, 2 times daily (morning and late afternoon)    methylPREDNISolone (Medrol, Saturnino,) 4 mg tablets Follow schedule on package instructions    multivitamin tablet 1 tablet, oral, Daily    ondansetron ODT (ZOFRAN-ODT) 4 mg, oral, Every 8 hours PRN    pantoprazole (ProtoNix) 40 mg EC tablet Take 1 tablet (40 mg) by mouth once daily as needed for heartburn. Do not crush, chew, or split.     Allergies   Allergen Reactions    Codeine  Rash and Nausea/vomiting    Sulfa (Sulfonamide Antibiotics) Rash    Sulfamethoxazole-Trimethoprim Rash     Objective   There were no vitals filed for this visit.  There is no height or weight on file to calculate BMI.    BP Readings from Last 3 Encounters:   03/08/24 114/72   12/31/23 140/68   12/30/23 134/78      Wt Readings from Last 3 Encounters:   07/09/24 82.6 kg (182 lb)   03/08/24 82.6 kg (182 lb)   03/07/24 80.7 kg (178 lb)        No visits with results within 1 Month(s) from this visit.   Latest known visit with results is:   Lab on 03/06/2024   Component Date Value    Cholesterol 03/06/2024 165     HDL-Cholesterol 03/06/2024 49.1     Cholesterol/HDL Ratio 03/06/2024 3.4     LDL Calculated 03/06/2024 88     VLDL 03/06/2024 28     Triglycerides 03/06/2024 139     Non HDL Cholesterol 03/06/2024 116     Thyroid Stimulating Horm* 03/06/2024 1.09     Glucose 03/06/2024 126 (H)     Sodium 03/06/2024 140     Potassium 03/06/2024 4.7     Chloride 03/06/2024 105     Bicarbonate 03/06/2024 28     Anion Gap 03/06/2024 12     Urea Nitrogen 03/06/2024 20     Creatinine 03/06/2024 0.68     eGFR 03/06/2024 >90     Calcium 03/06/2024 9.7     Albumin 03/06/2024 4.4     Alkaline Phosphatase 03/06/2024 138 (H)     Total Protein 03/06/2024 6.4     AST 03/06/2024 16     Bilirubin, Total 03/06/2024 0.8     ALT 03/06/2024 15     WBC 03/06/2024 4.4     nRBC 03/06/2024 0.0     RBC 03/06/2024 4.42     Hemoglobin 03/06/2024 12.9     Hematocrit 03/06/2024 39.8     MCV 03/06/2024 90     MCH 03/06/2024 29.2     MCHC 03/06/2024 32.4     RDW 03/06/2024 12.4     Platelets 03/06/2024 231     Neutrophils % 03/06/2024 58.6     Immature Granulocytes %,* 03/06/2024 0.2     Lymphocytes % 03/06/2024 32.1     Monocytes % 03/06/2024 5.9     Eosinophils % 03/06/2024 2.3     Basophils % 03/06/2024 0.9     Neutrophils Absolute 03/06/2024 2.59     Immature Granulocytes Ab* 03/06/2024 0.01     Lymphocytes Absolute 03/06/2024 1.42     Monocytes Absolute  03/06/2024 0.26     Eosinophils Absolute 03/06/2024 0.10     Basophils Absolute 03/06/2024 0.04     HIV 1/2 Antigen/Antibody* 03/06/2024 Nonreactive     Hepatitis C AB 03/06/2024 Nonreactive     Hemoglobin A1C 03/06/2024 5.8 (H)     Estimated Average Glucose 03/06/2024 120      Physical Exam  CONSTITUTIONAL - well nourished, well developed, looks like stated age, in no acute distress, not ill-appearing, but tired appearing, nasal speech  SKIN - normal skin color and pigmentation  EYES - normal external exam  LUNGS - breathing comfortably, no dyspnea  EXTREMITIES - no deformities noticeable on camera  NEUROLOGICAL - oriented and no focal signs  PSYCHIATRIC - alert, pleasant and cordial, not anxious or depressed appearing    Assessment/Plan   Problem List Items Addressed This Visit       Sinobronchitis - Primary     Risks, benefits, and options of treatment(s) were discussed after reviewing all current medication(s) and drug allergy(ies)  I opted for the treatment that we discussed with instructions on the medication use for your underlying medical ailment(s)  I encouraged supportive care such as rest, fluids and Advil/Tylenol as warranted  Return to the clinic in 7-10 days or sooner if symptoms worsen or persist as we will then further evaluate         Relevant Medications    methylPREDNISolone (Medrol, Saturnino,) 4 mg tablets    doxycycline (Vibramycin) 100 mg capsule

## 2024-09-19 ENCOUNTER — TELEPHONE (OUTPATIENT)
Dept: OBSTETRICS AND GYNECOLOGY | Facility: CLINIC | Age: 59
End: 2024-09-19
Payer: COMMERCIAL

## 2024-09-19 DIAGNOSIS — B37.31 YEAST VAGINITIS: Primary | ICD-10-CM

## 2024-09-19 RX ORDER — FLUCONAZOLE 150 MG/1
150 TABLET ORAL ONCE
Qty: 1 TABLET | Refills: 0 | Status: SHIPPED | OUTPATIENT
Start: 2024-09-19 | End: 2024-09-19

## 2024-09-19 NOTE — TELEPHONE ENCOUNTER
Bree Bojorquez called in requesting Rx be sent to pharmacy.      Last Office Visit: 12/7/2023  Next Office Visit: 12/9/2024  Med: Diflucan  Pharmacy: [facility] Nicole Manley MA

## 2024-10-18 DIAGNOSIS — I10 BENIGN ESSENTIAL HYPERTENSION: ICD-10-CM

## 2024-10-18 RX ORDER — LISINOPRIL 10 MG/1
10 TABLET ORAL DAILY
Qty: 30 TABLET | Refills: 0 | Status: SHIPPED | OUTPATIENT
Start: 2024-10-18 | End: 2024-11-17

## 2024-10-19 DIAGNOSIS — M17.0 PRIMARY OSTEOARTHRITIS OF BOTH KNEES: ICD-10-CM

## 2024-10-21 RX ORDER — MELOXICAM 15 MG/1
15 TABLET ORAL
Qty: 90 TABLET | Refills: 0 | Status: SHIPPED | OUTPATIENT
Start: 2024-10-21 | End: 2025-01-19

## 2024-10-28 ENCOUNTER — TELEMEDICINE (OUTPATIENT)
Dept: PRIMARY CARE | Facility: CLINIC | Age: 59
End: 2024-10-28
Payer: COMMERCIAL

## 2024-10-28 DIAGNOSIS — B99.9 RECURRENT INFECTIONS: ICD-10-CM

## 2024-10-28 DIAGNOSIS — E78.2 MIXED HYPERLIPIDEMIA: ICD-10-CM

## 2024-10-28 DIAGNOSIS — J01.40 ACUTE NON-RECURRENT PANSINUSITIS: Primary | ICD-10-CM

## 2024-10-28 DIAGNOSIS — H69.93 EUSTACHIAN TUBE DYSFUNCTION, BILATERAL: ICD-10-CM

## 2024-10-28 DIAGNOSIS — R73.03 PREDIABETES: ICD-10-CM

## 2024-10-28 PROCEDURE — 1036F TOBACCO NON-USER: CPT | Performed by: FAMILY MEDICINE

## 2024-10-28 PROCEDURE — 99214 OFFICE O/P EST MOD 30 MIN: CPT | Performed by: FAMILY MEDICINE

## 2024-10-28 RX ORDER — LEVOFLOXACIN 500 MG/1
500 TABLET, FILM COATED ORAL DAILY
Qty: 10 TABLET | Refills: 0 | Status: SHIPPED | OUTPATIENT
Start: 2024-10-28 | End: 2024-11-07

## 2024-10-31 ENCOUNTER — LAB (OUTPATIENT)
Dept: LAB | Facility: LAB | Age: 59
End: 2024-10-31
Payer: COMMERCIAL

## 2024-10-31 DIAGNOSIS — E78.2 MIXED HYPERLIPIDEMIA: ICD-10-CM

## 2024-10-31 DIAGNOSIS — B99.9 RECURRENT INFECTIONS: ICD-10-CM

## 2024-10-31 LAB
ALBUMIN SERPL BCP-MCNC: 4.4 G/DL (ref 3.4–5)
ALP SERPL-CCNC: 142 U/L (ref 33–110)
ALT SERPL W P-5'-P-CCNC: 24 U/L (ref 7–45)
ANION GAP SERPL CALC-SCNC: 15 MMOL/L (ref 10–20)
AST SERPL W P-5'-P-CCNC: 20 U/L (ref 9–39)
BASOPHILS # BLD AUTO: 0.07 X10*3/UL (ref 0–0.1)
BASOPHILS NFR BLD AUTO: 1 %
BILIRUB SERPL-MCNC: 0.6 MG/DL (ref 0–1.2)
BUN SERPL-MCNC: 14 MG/DL (ref 6–23)
CALCIUM SERPL-MCNC: 9.4 MG/DL (ref 8.6–10.6)
CHLORIDE SERPL-SCNC: 106 MMOL/L (ref 98–107)
CHOLEST SERPL-MCNC: 173 MG/DL (ref 0–199)
CHOLESTEROL/HDL RATIO: 3.3
CO2 SERPL-SCNC: 27 MMOL/L (ref 21–32)
CREAT SERPL-MCNC: 0.76 MG/DL (ref 0.5–1.05)
EGFRCR SERPLBLD CKD-EPI 2021: >90 ML/MIN/1.73M*2
EOSINOPHIL # BLD AUTO: 0.11 X10*3/UL (ref 0–0.7)
EOSINOPHIL NFR BLD AUTO: 1.6 %
ERYTHROCYTE [DISTWIDTH] IN BLOOD BY AUTOMATED COUNT: 11.9 % (ref 11.5–14.5)
GLUCOSE SERPL-MCNC: 113 MG/DL (ref 74–99)
HCT VFR BLD AUTO: 40.5 % (ref 36–46)
HDLC SERPL-MCNC: 51.9 MG/DL
HGB BLD-MCNC: 13.7 G/DL (ref 12–16)
IMM GRANULOCYTES # BLD AUTO: 0.03 X10*3/UL (ref 0–0.7)
IMM GRANULOCYTES NFR BLD AUTO: 0.4 % (ref 0–0.9)
LDLC SERPL CALC-MCNC: 92 MG/DL
LYMPHOCYTES # BLD AUTO: 1.6 X10*3/UL (ref 1.2–4.8)
LYMPHOCYTES NFR BLD AUTO: 23.9 %
MCH RBC QN AUTO: 30.4 PG (ref 26–34)
MCHC RBC AUTO-ENTMCNC: 33.8 G/DL (ref 32–36)
MCV RBC AUTO: 90 FL (ref 80–100)
MONOCYTES # BLD AUTO: 0.38 X10*3/UL (ref 0.1–1)
MONOCYTES NFR BLD AUTO: 5.7 %
NEUTROPHILS # BLD AUTO: 4.51 X10*3/UL (ref 1.2–7.7)
NEUTROPHILS NFR BLD AUTO: 67.4 %
NON HDL CHOLESTEROL: 121 MG/DL (ref 0–149)
NRBC BLD-RTO: 0 /100 WBCS (ref 0–0)
PLATELET # BLD AUTO: 236 X10*3/UL (ref 150–450)
POTASSIUM SERPL-SCNC: 4.3 MMOL/L (ref 3.5–5.3)
PROT SERPL-MCNC: 6.6 G/DL (ref 6.4–8.2)
RBC # BLD AUTO: 4.51 X10*6/UL (ref 4–5.2)
SODIUM SERPL-SCNC: 144 MMOL/L (ref 136–145)
TRIGL SERPL-MCNC: 146 MG/DL (ref 0–149)
VLDL: 29 MG/DL (ref 0–40)
WBC # BLD AUTO: 6.7 X10*3/UL (ref 4.4–11.3)

## 2024-10-31 PROCEDURE — 36415 COLL VENOUS BLD VENIPUNCTURE: CPT

## 2024-10-31 PROCEDURE — 80053 COMPREHEN METABOLIC PANEL: CPT

## 2024-10-31 PROCEDURE — 85025 COMPLETE CBC W/AUTO DIFF WBC: CPT

## 2024-10-31 PROCEDURE — 80061 LIPID PANEL: CPT

## 2024-10-31 PROCEDURE — 86317 IMMUNOASSAY INFECTIOUS AGENT: CPT

## 2024-11-04 LAB
S PN DA SERO 19F IGG SER-MCNC: 1.84 UG/ML
S PNEUM DA 1 IGG SER-MCNC: 1.61 UG/ML
S PNEUM DA 10A IGG SER-MCNC: 0.02 UG/ML
S PNEUM DA 11A IGG SER-MCNC: 0.45 UG/ML
S PNEUM DA 12F IGG SER-MCNC: 0.13 UG/ML
S PNEUM DA 14 IGG SER-MCNC: 0.12 UG/ML
S PNEUM DA 15B IGG SER-MCNC: 0.24 UG/ML
S PNEUM DA 17F IGG SER-MCNC: 0.12 UG/ML
S PNEUM DA 18C IGG SER-MCNC: 2.42 UG/ML
S PNEUM DA 19A IGG SER-MCNC: 0.64 UG/ML
S PNEUM DA 2 IGG SER-MCNC: 0.8 UG/ML
S PNEUM DA 20A IGG SER-MCNC: 0.09 UG/ML
S PNEUM DA 22F IGG SER-MCNC: 0.05 UG/ML
S PNEUM DA 23F IGG SER-MCNC: 0.19 UG/ML
S PNEUM DA 3 IGG SER-MCNC: 0.22 UG/ML
S PNEUM DA 33F IGG SER-MCNC: 3.84 UG/ML
S PNEUM DA 4 IGG SER-MCNC: 0.12 UG/ML
S PNEUM DA 5 IGG SER-MCNC: 0.21 UG/ML
S PNEUM DA 6B IGG SER-MCNC: 0.16 UG/ML
S PNEUM DA 7F IGG SER-MCNC: 3.13 UG/ML
S PNEUM DA 8 IGG SER-MCNC: 0.93 UG/ML
S PNEUM DA 9N IGG SER-MCNC: 0.13 UG/ML
S PNEUM DA 9V IGG SER-MCNC: 0.6 UG/ML
S PNEUM SEROTYPE IGG SER-IMP: NORMAL

## 2024-11-05 DIAGNOSIS — R74.8 ELEVATED ALKALINE PHOSPHATASE LEVEL: Primary | ICD-10-CM

## 2024-11-06 ENCOUNTER — APPOINTMENT (OUTPATIENT)
Dept: PRIMARY CARE | Facility: CLINIC | Age: 59
End: 2024-11-06
Payer: COMMERCIAL

## 2024-11-06 VITALS
WEIGHT: 175 LBS | HEART RATE: 68 BPM | HEIGHT: 68 IN | BODY MASS INDEX: 26.52 KG/M2 | DIASTOLIC BLOOD PRESSURE: 80 MMHG | SYSTOLIC BLOOD PRESSURE: 136 MMHG

## 2024-11-06 DIAGNOSIS — J45.909 ASTHMA, UNSPECIFIED ASTHMA SEVERITY, UNSPECIFIED WHETHER COMPLICATED, UNSPECIFIED WHETHER PERSISTENT (HHS-HCC): ICD-10-CM

## 2024-11-06 DIAGNOSIS — R11.0 NAUSEA: ICD-10-CM

## 2024-11-06 DIAGNOSIS — R73.03 PREDIABETES: ICD-10-CM

## 2024-11-06 DIAGNOSIS — I10 BENIGN ESSENTIAL HYPERTENSION: ICD-10-CM

## 2024-11-06 DIAGNOSIS — M17.0 PRIMARY OSTEOARTHRITIS OF BOTH KNEES: ICD-10-CM

## 2024-11-06 DIAGNOSIS — R74.8 ELEVATED ALKALINE PHOSPHATASE LEVEL: ICD-10-CM

## 2024-11-06 DIAGNOSIS — F41.1 ANXIETY, GENERALIZED: ICD-10-CM

## 2024-11-06 DIAGNOSIS — E88.819 INSULIN RESISTANCE: ICD-10-CM

## 2024-11-06 DIAGNOSIS — E78.2 MIXED HYPERLIPIDEMIA: Primary | ICD-10-CM

## 2024-11-06 DIAGNOSIS — Z23 NEED FOR 23-POLYVALENT PNEUMOCOCCAL POLYSACCHARIDE VACCINE: ICD-10-CM

## 2024-11-06 DIAGNOSIS — B99.9 RECURRENT INFECTIONS: ICD-10-CM

## 2024-11-06 PROBLEM — M17.12 OSTEOARTHRITIS OF LEFT KNEE: Status: RESOLVED | Noted: 2023-10-08 | Resolved: 2024-11-06

## 2024-11-06 PROCEDURE — 3079F DIAST BP 80-89 MM HG: CPT | Performed by: FAMILY MEDICINE

## 2024-11-06 PROCEDURE — 3008F BODY MASS INDEX DOCD: CPT | Performed by: FAMILY MEDICINE

## 2024-11-06 PROCEDURE — 3075F SYST BP GE 130 - 139MM HG: CPT | Performed by: FAMILY MEDICINE

## 2024-11-06 PROCEDURE — 99215 OFFICE O/P EST HI 40 MIN: CPT | Performed by: FAMILY MEDICINE

## 2024-11-06 PROCEDURE — 1036F TOBACCO NON-USER: CPT | Performed by: FAMILY MEDICINE

## 2024-11-06 RX ORDER — ESCITALOPRAM OXALATE 10 MG/1
10 TABLET ORAL DAILY
Qty: 90 TABLET | Refills: 1 | Status: SHIPPED | OUTPATIENT
Start: 2024-11-06 | End: 2025-05-05

## 2024-11-06 RX ORDER — ONDANSETRON 4 MG/1
4 TABLET, ORALLY DISINTEGRATING ORAL EVERY 8 HOURS PRN
Qty: 20 TABLET | Refills: 1 | Status: SHIPPED | OUTPATIENT
Start: 2024-11-06

## 2024-11-06 RX ORDER — MELOXICAM 15 MG/1
15 TABLET ORAL
Qty: 90 TABLET | Refills: 0 | Status: SHIPPED | OUTPATIENT
Start: 2024-11-06 | End: 2025-02-04

## 2024-11-06 RX ORDER — METFORMIN HYDROCHLORIDE 750 MG/1
750 TABLET, EXTENDED RELEASE ORAL
Qty: 180 TABLET | Refills: 1 | Status: SHIPPED | OUTPATIENT
Start: 2024-11-06 | End: 2025-05-05

## 2024-11-06 RX ORDER — LISINOPRIL 10 MG/1
10 TABLET ORAL DAILY
Qty: 90 TABLET | Refills: 1 | Status: SHIPPED | OUTPATIENT
Start: 2024-11-06 | End: 2025-05-05

## 2024-11-06 RX ORDER — ALBUTEROL SULFATE 90 UG/1
1-2 INHALANT RESPIRATORY (INHALATION) AS NEEDED
Qty: 18 G | Refills: 3 | Status: SHIPPED | OUTPATIENT
Start: 2024-11-06

## 2024-11-06 RX ORDER — ATORVASTATIN CALCIUM 20 MG/1
20 TABLET, FILM COATED ORAL DAILY
Qty: 90 TABLET | Refills: 1 | Status: SHIPPED | OUTPATIENT
Start: 2024-11-06 | End: 2025-05-05

## 2024-11-06 ASSESSMENT — PATIENT HEALTH QUESTIONNAIRE - PHQ9
1. LITTLE INTEREST OR PLEASURE IN DOING THINGS: NOT AT ALL
SUM OF ALL RESPONSES TO PHQ9 QUESTIONS 1 AND 2: 0
2. FEELING DOWN, DEPRESSED OR HOPELESS: NOT AT ALL

## 2024-11-06 NOTE — ASSESSMENT & PLAN NOTE
Stable, no changes to medication recommended  Once you are ready to taper the Lexapro, let me know so that I can write you a tapering dose

## 2024-11-06 NOTE — ASSESSMENT & PLAN NOTE
Blood glucose remains elevated in the prediabetic range  Please try to cut back on carbohydrates and get some exercise  We will continue to monitor your blood sugar going forward  We talked about considering increasing metformin, but at this time you chose to stay on the 750 mg twice daily which is reasonable

## 2024-11-06 NOTE — ASSESSMENT & PLAN NOTE
Alkaline phosphatase increased from 138 to 142.  Discussed prior testing done by Dr. Chow on 11/17/2020.  Will continue to monitor with routine blood work but recommended no further testing at this time.

## 2024-11-06 NOTE — ASSESSMENT & PLAN NOTE
Due to your lack of immunity on the Streptococcus pneumoniae titer, I am going to send this prescription over to the pharmacy to obtain there, and then I would like to have you repeat the blood work in a month and we will continue with this series x 3 months if needed    If you get to the third month, and you are still nonimmune, immunology consultation would be appropriate

## 2024-11-06 NOTE — PROGRESS NOTES
Subjective   Patient ID: Bree Bojorquez is a 58 y.o. female who presents for Osteoarthritis, Hypertension, and Prediabetes.    Past Medical, Surgical, and Family History reviewed and updated in chart.    Reviewed all medications by prescribing practitioner or clinical pharmacist (such as prescriptions, OTCs, herbal therapies and supplements) and documented in the medical record.    HPI  1. Prediabetes    Eri reports feeling well on Metformin  mg twice daily, experiencing no gastrointestinal side effects, and is requesting a refill. Her recent Hemoglobin A1c level was 5.8%, with previous readings of 6.1%, 5.9%, 5.6%, and 6.0%. Her last fasting glucose level was 113, down from the high 120 range. She notes an increase in activity levels since both knees were replaced but admits to not significantly changing her diet, mentioning that carbohydrates remain a staple throughout the day.    2. Hyperlipidemia    Eri is currently taking atorvastatin 20 mg daily and is tolerating it well. Her cholesterol levels remain stable, as noted in the data points below. She is requesting a refill.    3. Recurrent Infections    Eri reports experiencing an increase in sinus infections over the past few years. She was treated for two episodes of sinusitis in the past two months, both of which improved with antibiotics. Today, she presents with minimal left ear pain and popping when blowing her nose. She is on her last day of Levaquin, prescribed on 10/28. Due to the recurrent sinus infections, a Strep pneumoniae titer was checked before this visit, showing 18 of the 23 titers are low. She is interested in receiving the pneumonia vaccine to enhance her immune system.    4. Elevated Alkaline Phosphatase    Eri presents with an elevated alkaline phosphatase level found during routine blood work. She is asymptomatic and was previously seen by Dr. Chow on 11/17/2020, who performed an alkaline phosphatase isoenzyme test and found no  elevation. He conducted a liver workup due to the long history, which returned negative results.    5. Hypertension    Eri is normotensive on intake, with a repeat measurement of 136/80. She reports no chest pain or shortness of breath and tolerates her medication well. She is requesting a refill.    6. Anxiety    Eri is currently taking Lexapro 10 mg and tolerating it well. She is interested in weaning off the medication starting in the new year and is requesting a refill.    7. Asthma    Eri is currently using an albuterol inhaler as needed and is tolerating it well. She is requesting a refill.    8. Osteoarthritis Both Knees    Eri underwent bilateral knee replacement surgery, with the right total knee replacement (TKR) completed in September 2023 and the left TKR in December 2023. She has been following up with orthopedics and has been cleared for a follow-up in one year. She is taking Meloxicam every morning for pain management and is interested in weaning off Meloxicam in the new year.    9. Nausea    Postoperatively, Eri experienced significant nausea, for which she has been taking Zofran. She is requesting a refill.    Review of Systems  All pertinent positive symptoms are included in the history of present illness.    All other systems have been reviewed and are negative and noncontributory to this patient's current ailments.    Past Medical History:   Diagnosis Date    Adjustment disorder with depressed mood     Complicated bereavement    Anxiety     Asthma     Basal cell carcinoma     skin cancer to back and chest    Encounter for screening for malignant neoplasm of vagina     Vaginal Pap smear    GERD (gastroesophageal reflux disease)     Hyperlipidemia     Hypertension     Major depressive disorder, recurrent, moderate 03/23/2016    Major depressive disorder, recurrent episode, moderate with anxious distress    Metabolic syndrome     Insulin resistance    Other specified disorders of nose and nasal  sinuses 03/27/2020    Sinus pressure    Personal history of other diseases of the respiratory system     History of allergic rhinitis    Personal history of other medical treatment     H/O mammogram    Personal history of other specified conditions 10/15/2019    History of abnormal mammogram    Radiculopathy, cervical region 05/10/2021    Cervical radicular pain    Radiculopathy, cervicothoracic region 05/10/2021    Radiculopathy of cervicothoracic region    Unspecified abnormal findings in urine 10/15/2019    Abnormal finding on urinalysis     Past Surgical History:   Procedure Laterality Date    BREAST SURGERY      right breast aspiration of cyst    CATARACT EXTRACTION Bilateral     KNEE ARTHROPLASTY Right     9/15/23    KNEE SURGERY  01/10/2014    left knee scope    PARTIAL HYSTERECTOMY      LSH    SALPINGOOPHORECTOMY Left      Social History     Tobacco Use    Smoking status: Never    Smokeless tobacco: Never   Vaping Use    Vaping status: Never Used   Substance Use Topics    Alcohol use: Never    Drug use: Never     Family History   Problem Relation Name Age of Onset    Depression Mother      Hyperlipidemia Mother      Hypertension Mother      Hyperthyroidism Mother      Stroke Mother      Diabetes type II Mother      Coronary artery disease Father      Other (coronary artery surgery) Father      Breast cancer Father's Sister       Immunization History   Administered Date(s) Administered    DTaP, Unspecified 12/09/2018    Flu vaccine (IIV4), preservative free *Check age/dose* 09/17/2016, 10/27/2023    Flu vaccine, quadrivalent, no egg protein, age 6 month or greater (FLUCELVAX) 09/24/2018    Influenza, Unspecified 11/15/2001, 11/04/2002, 10/14/2010, 09/09/2011, 09/20/2015, 10/06/2017, 09/20/2019, 09/04/2020, 09/21/2021, 10/31/2022    Novel influenza-H1N1-09, preservative-free 01/11/2010    Pfizer COVID-19 vaccine, bivalent, age 12 years and older (30 mcg/0.3 mL) 10/31/2022    Pfizer Purple Cap SARS-CoV-2  "03/20/2021, 04/11/2021, 11/23/2021, 01/20/2024    Pneumococcal Conjugate PCV 7 1965    Tdap vaccine, age 7 year and older (BOOSTRIX, ADACEL) 10/15/2012, 12/09/2018    Zoster vaccine, recombinant, adult (SHINGRIX) 02/22/2023, 06/14/2023    Zoster, live 09/21/2016, 10/03/2016     Current Outpatient Medications   Medication Instructions    acetaminophen (TYLENOL) 975 mg, oral, Every 8 hours PRN    albuterol 90 mcg/actuation inhaler 1-2 puffs, inhalation, As needed, Every 4 -6 hours as needed    albuterol 2.5 mg, nebulization, As needed, Every 4-6 hours as needed for wheezing    atorvastatin (LIPITOR) 20 mg, oral, Daily    bimatoprost (Latisse) 0.03 % ophthalmic solution use as directed    escitalopram (LEXAPRO) 10 mg, oral, Daily    levoFLOXacin (LEVAQUIN) 500 mg, oral, Daily    lisinopril 10 mg, oral, Daily, as directed    meloxicam (MOBIC) 15 mg, oral, Daily with breakfast    metFORMIN XR (GLUCOPHAGE-XR) 750 mg, oral, 2 times daily (morning and late afternoon)    multivitamin tablet 1 tablet, oral, Daily    ondansetron ODT (ZOFRAN-ODT) 4 mg, oral, Every 8 hours PRN    pantoprazole (ProtoNix) 40 mg EC tablet Take 1 tablet (40 mg) by mouth once daily as needed for heartburn. Do not crush, chew, or split.    pneumococcal polysaccharide (pneumococcal 23-boaz ps vaccine) 25 mcg/0.5 mL vaccine injection Inject 0.5 mL today     Allergies   Allergen Reactions    Codeine Rash and Nausea/vomiting    Sulfa (Sulfonamide Antibiotics) Rash    Sulfamethoxazole-Trimethoprim Rash       Objective   Vitals:    11/06/24 0910   BP: 136/80   Pulse: 68   Weight: 79.4 kg (175 lb)   Height: 1.727 m (5' 8\")     Body mass index is 26.61 kg/m².    BP Readings from Last 3 Encounters:   11/06/24 136/80   03/08/24 114/72   12/31/23 140/68      Wt Readings from Last 3 Encounters:   11/06/24 79.4 kg (175 lb)   07/09/24 82.6 kg (182 lb)   03/08/24 82.6 kg (182 lb)        Lab on 10/31/2024   Component Date Value    Glucose 10/31/2024 113 (H)     " Sodium 10/31/2024 144     Potassium 10/31/2024 4.3     Chloride 10/31/2024 106     Bicarbonate 10/31/2024 27     Anion Gap 10/31/2024 15     Urea Nitrogen 10/31/2024 14     Creatinine 10/31/2024 0.76     eGFR 10/31/2024 >90     Calcium 10/31/2024 9.4     Albumin 10/31/2024 4.4     Alkaline Phosphatase 10/31/2024 142 (H)     Total Protein 10/31/2024 6.6     AST 10/31/2024 20     Bilirubin, Total 10/31/2024 0.6     ALT 10/31/2024 24     WBC 10/31/2024 6.7     nRBC 10/31/2024 0.0     RBC 10/31/2024 4.51     Hemoglobin 10/31/2024 13.7     Hematocrit 10/31/2024 40.5     MCV 10/31/2024 90     MCH 10/31/2024 30.4     MCHC 10/31/2024 33.8     RDW 10/31/2024 11.9     Platelets 10/31/2024 236     Neutrophils % 10/31/2024 67.4     Immature Granulocytes %,* 10/31/2024 0.4     Lymphocytes % 10/31/2024 23.9     Monocytes % 10/31/2024 5.7     Eosinophils % 10/31/2024 1.6     Basophils % 10/31/2024 1.0     Neutrophils Absolute 10/31/2024 4.51     Immature Granulocytes Ab* 10/31/2024 0.03     Lymphocytes Absolute 10/31/2024 1.60     Monocytes Absolute 10/31/2024 0.38     Eosinophils Absolute 10/31/2024 0.11     Basophils Absolute 10/31/2024 0.07     Cholesterol 10/31/2024 173     HDL-Cholesterol 10/31/2024 51.9     Cholesterol/HDL Ratio 10/31/2024 3.3     LDL Calculated 10/31/2024 92     VLDL 10/31/2024 29     Triglycerides 10/31/2024 146     Non HDL Cholesterol 10/31/2024 121     Serotype 1 10/31/2024 1.61     Serotype 2 10/31/2024 0.80     Serotype 3 10/31/2024 0.22     Serotype 4 10/31/2024 0.12     Serotype 5 10/31/2024 0.21     Serotype 8 10/31/2024 0.93     Serotype 9N 10/31/2024 0.13     Serotype 12F 10/31/2024 0.13     Serotype 14 10/31/2024 0.12     Serotype 17F 10/31/2024 0.12     Serotype 19F 10/31/2024 1.84     Serotype 20 10/31/2024 0.09     Serotype 22F 10/31/2024 0.05     Serotype 23F 10/31/2024 0.19     Serotype 6B(26) 10/31/2024 0.16     Serotype 10A(34) 10/31/2024 0.02     Serotype 11A(43) 10/31/2024 0.45      Serotype 7F(51) 10/31/2024 3.13     Serotype 15B(54) 10/31/2024 0.24     Serotype 18C(56) 10/31/2024 2.42     Serotype 19A(57) 10/31/2024 0.64     Serotype 9V(68) 10/31/2024 0.60     Serotype 33F(70) 10/31/2024 3.84     Pneumo Serotype Interpre* 10/31/2024 See Note      Physical Exam  CONSTITUTIONAL - well nourished, well developed, looks like stated age, in no acute distress, not ill-appearing, and not tired appearing  SKIN - normal skin color and pigmentation, no rash, lesions, or nodules visualized  ENT - TM's intact, no injection, no signs of infection, uvula midline, normal tongue movement and throat normal, no exudate  NECK - supple without rigidity, no neck mass was observed, no thyromegaly or thyroid nodules  CHEST - clear to auscultation, no wheezing, no crackles and no rales, good effort  CARDIAC - regular rate and regular rhythm, no skipped beats, no murmur  EXTREMITIES - no obvious or evident edema, no obvious or evident deformities  PSYCHIATRIC - alert, pleasant and cordial, age-appropriate  IMMUNOLOGIC - no cervical lymphadenopathy    Assessment/Plan   Problem List Items Addressed This Visit       Anxiety, generalized     Stable, no changes to medication recommended  Once you are ready to taper the Lexapro, let me know so that I can write you a tapering dose         Relevant Medications    escitalopram (Lexapro) 10 mg tablet    Benign essential hypertension     Stable, no changes to medication recommended         Relevant Medications    lisinopril 10 mg tablet    Elevated alkaline phosphatase level     Alkaline phosphatase increased from 138 to 142.  Discussed prior testing done by Dr. Chow on 11/17/2020.  Will continue to monitor with routine blood work but recommended no further testing at this time.           Prediabetes     Blood glucose remains elevated in the prediabetic range  Please try to cut back on carbohydrates and get some exercise  We will continue to monitor your blood sugar going  forward  We talked about considering increasing metformin, but at this time you chose to stay on the 750 mg twice daily which is reasonable         Relevant Medications    metFORMIN XR (Glucophage-XR) 750 mg 24 hr tablet    Primary osteoarthritis of both knees     Continue meloxicam 15 mg in the a.m. for pain control.          Relevant Medications    meloxicam (Mobic) 15 mg tablet    Insulin resistance     Blood glucose remains elevated in the prediabetic range  Please try to cut back on carbohydrates and get some exercise  We will continue to monitor your blood sugar going forward         Relevant Medications    metFORMIN XR (Glucophage-XR) 750 mg 24 hr tablet    Mixed hyperlipidemia - Primary     Stable, no changes to medication recommended         Relevant Medications    atorvastatin (Lipitor) 20 mg tablet    Asthma     Stable, no changes to medication recommended         Relevant Medications    albuterol 90 mcg/actuation inhaler    Recurrent infections     See Pneumovax 23 for plan         Relevant Orders    Strep Pneumo IgG Ab 23 Serotypes    Need for 23-polyvalent pneumococcal polysaccharide vaccine     Due to your lack of immunity on the Streptococcus pneumoniae titer, I am going to send this prescription over to the pharmacy to obtain there, and then I would like to have you repeat the blood work in a month and we will continue with this series x 3 months if needed    If you get to the third month, and you are still nonimmune, immunology consultation would be appropriate         Relevant Medications    pneumococcal polysaccharide (pneumococcal 23-boaz ps vaccine) 25 mcg/0.5 mL vaccine injection    Nausea    Relevant Medications    ondansetron ODT (Zofran-ODT) 4 mg disintegrating tablet

## 2024-11-06 NOTE — ASSESSMENT & PLAN NOTE
Blood glucose remains elevated in the prediabetic range  Please try to cut back on carbohydrates and get some exercise  We will continue to monitor your blood sugar going forward

## 2024-11-14 ENCOUNTER — TELEPHONE (OUTPATIENT)
Dept: OBSTETRICS AND GYNECOLOGY | Facility: CLINIC | Age: 59
End: 2024-11-14
Payer: COMMERCIAL

## 2024-11-14 DIAGNOSIS — B37.31 YEAST VAGINITIS: Primary | ICD-10-CM

## 2024-11-14 RX ORDER — FLUCONAZOLE 150 MG/1
150 TABLET ORAL ONCE
Qty: 1 TABLET | Refills: 0 | Status: SHIPPED | OUTPATIENT
Start: 2024-11-14 | End: 2024-11-14

## 2024-11-14 NOTE — TELEPHONE ENCOUNTER
Patient called in requesting a 1 pill Rx for a yeast infection.Patient stated she just recently finished a antibiotic for a sinus infection ,and that it usually cause her to get a infection.    Last office visit:12/7/23  Next office visit:12/9/24  Med:  Pharmacy:[facility]/pharmacy #[id number] [facility] [address] [facility]       Candace Woodson CNA

## 2024-12-09 ENCOUNTER — LAB (OUTPATIENT)
Dept: LAB | Facility: LAB | Age: 59
End: 2024-12-09
Payer: COMMERCIAL

## 2024-12-09 ENCOUNTER — HOSPITAL ENCOUNTER (OUTPATIENT)
Dept: RADIOLOGY | Facility: CLINIC | Age: 59
Discharge: HOME | End: 2024-12-09
Payer: COMMERCIAL

## 2024-12-09 ENCOUNTER — APPOINTMENT (OUTPATIENT)
Dept: OBSTETRICS AND GYNECOLOGY | Facility: CLINIC | Age: 59
End: 2024-12-09
Payer: COMMERCIAL

## 2024-12-09 VITALS
WEIGHT: 180 LBS | SYSTOLIC BLOOD PRESSURE: 148 MMHG | HEIGHT: 68 IN | DIASTOLIC BLOOD PRESSURE: 82 MMHG | BODY MASS INDEX: 27.28 KG/M2

## 2024-12-09 VITALS — WEIGHT: 175 LBS | BODY MASS INDEX: 26.61 KG/M2

## 2024-12-09 DIAGNOSIS — Z01.419 WELL WOMAN EXAM WITH ROUTINE GYNECOLOGICAL EXAM: Primary | ICD-10-CM

## 2024-12-09 DIAGNOSIS — Z12.31 VISIT FOR SCREENING MAMMOGRAM: ICD-10-CM

## 2024-12-09 DIAGNOSIS — R30.0 DYSURIA: ICD-10-CM

## 2024-12-09 DIAGNOSIS — B99.9 RECURRENT INFECTIONS: ICD-10-CM

## 2024-12-09 LAB
POC BILIRUBIN, URINE: NEGATIVE
POC BLOOD, URINE: NEGATIVE
POC GLUCOSE, URINE: NEGATIVE MG/DL
POC KETONES, URINE: NEGATIVE MG/DL
POC LEUKOCYTES, URINE: ABNORMAL
POC NITRITE,URINE: NEGATIVE
POC PH, URINE: 5.5 PH
POC PROTEIN, URINE: NEGATIVE MG/DL
POC SPECIFIC GRAVITY, URINE: 1.01
POC UROBILINOGEN, URINE: 0.2 EU/DL

## 2024-12-09 PROCEDURE — 3077F SYST BP >= 140 MM HG: CPT | Performed by: OBSTETRICS & GYNECOLOGY

## 2024-12-09 PROCEDURE — 77067 SCR MAMMO BI INCL CAD: CPT | Performed by: RADIOLOGY

## 2024-12-09 PROCEDURE — 3008F BODY MASS INDEX DOCD: CPT | Performed by: OBSTETRICS & GYNECOLOGY

## 2024-12-09 PROCEDURE — 86317 IMMUNOASSAY INFECTIOUS AGENT: CPT

## 2024-12-09 PROCEDURE — 99396 PREV VISIT EST AGE 40-64: CPT | Performed by: OBSTETRICS & GYNECOLOGY

## 2024-12-09 PROCEDURE — 87086 URINE CULTURE/COLONY COUNT: CPT

## 2024-12-09 PROCEDURE — 1036F TOBACCO NON-USER: CPT | Performed by: OBSTETRICS & GYNECOLOGY

## 2024-12-09 PROCEDURE — 77063 BREAST TOMOSYNTHESIS BI: CPT | Performed by: RADIOLOGY

## 2024-12-09 PROCEDURE — 3079F DIAST BP 80-89 MM HG: CPT | Performed by: OBSTETRICS & GYNECOLOGY

## 2024-12-09 PROCEDURE — 77063 BREAST TOMOSYNTHESIS BI: CPT

## 2024-12-09 PROCEDURE — 81003 URINALYSIS AUTO W/O SCOPE: CPT | Performed by: OBSTETRICS & GYNECOLOGY

## 2024-12-09 PROCEDURE — 36415 COLL VENOUS BLD VENIPUNCTURE: CPT

## 2024-12-09 RX ORDER — NITROFURANTOIN (MACROCRYSTALS) 100 MG/1
100 CAPSULE ORAL 2 TIMES DAILY
Qty: 14 CAPSULE | Refills: 0 | Status: SHIPPED | OUTPATIENT
Start: 2024-12-09 | End: 2024-12-16

## 2024-12-09 ASSESSMENT — PATIENT HEALTH QUESTIONNAIRE - PHQ9
2. FEELING DOWN, DEPRESSED OR HOPELESS: NOT AT ALL
1. LITTLE INTEREST OR PLEASURE IN DOING THINGS: NOT AT ALL
SUM OF ALL RESPONSES TO PHQ9 QUESTIONS 1 AND 2: 0

## 2024-12-09 NOTE — PROGRESS NOTES
"Bree Bojorquez is a 59 y.o. female who is here for a annual exam.     She denies any vaginal bleeding since her hysterectomy    Menopause symptoms: Fewer and fewer symptoms.  Doing well    Sexually active: Active no concerns    Regular self breast exam: yes  no concerns on her exams    Menstrual History:  OB History          2    Para   2    Term                AB        Living   2         SAB        IAB        Ectopic        Multiple        Live Births                    No LMP recorded. Patient has had a hysterectomy.         Review of Systems  All Normal Review of Systems  Constitutional: no fever, no chills, no recent weight gain, no recent weight loss and no fatigue.    Gastrointestinal: no abdominal pain, no constipation, no nausea, no diarrhea and no vomiting.    Genitourinary: + dysuria, no urinary incontinence, no vaginal dryness, no vaginal itching, no dyspareunia, no pelvic pain, no dysmenorrhea, no sexual problems, no change in urinary frequency, no vaginal discharge, no unexplained vaginal bleeding and no lesion/sore.    Breasts: No masses.  No nipple discharge.  No redness    Objective   /82   Ht 1.727 m (5' 8\")   Wt 81.6 kg (180 lb)   BMI 27.37 kg/m²     OBGyn Exam   Physical Exam  Constitutional: Alert and in no acute distress. Well developed, well nourished.   Head and Face: Head and face: Normal.    Eyes: Normal external exam - nonicteric sclera, extraocular movements intact (EOMI) and no ptosis.   Ears, Nose, Mouth, and Throat: External inspection of ears and nose: Normal.    Neck: No neck asymmetry. Supple. Thyroid not enlarged and there were no palpable thyroid nodules.   Chest: Breasts: Normal appearance, no nipple discharge and no skin changes. Palpation of breasts and axillae: No palpable mass and no axillary lymphadenopathy.   Abdomen: Soft nontender; no abdominal mass palpated. No organomegaly. No hernias.     Genitourinary:   External genitalia: Normal. No " inguinal lymphadenopathy. Bartholin's Urethral and Skenes Glands: Normal. Urethra: Normal.    Bladder: Normal on palpation.   Vagina: Normal. No discharge  Cervix: Normal.    Uterus: absent.    Right Adnexa/parametria: Normal.  Left Adnexa/parametria: Normal.    Inspection of Perianal Area: Normal.     Musculoskeletal: No joint swelling seen, normal movements of all extremities.   Skin: Normal skin color and pigmentation, normal skin turgor, and no rash.   Neurologic: Non-focal. Grossly intact.   Psychiatric: Alert and oriented x 3. Affect normal to patient baseline. Mood: Appropriate.      Assessment/Plan   1) Annual exam:  Pap with HPV testing completed today  Mammogram order in place    2) Urinary symptoms  UA is positive today  Culture sent  Will start antibiotics    Thank you again for your visit to our office today  Please follow-up as needed or in 1 year with your annual exam

## 2024-12-10 LAB — BACTERIA UR CULT: NORMAL

## 2024-12-11 ENCOUNTER — OFFICE VISIT (OUTPATIENT)
Dept: PRIMARY CARE | Facility: CLINIC | Age: 59
End: 2024-12-11
Payer: COMMERCIAL

## 2024-12-11 DIAGNOSIS — J01.40 ACUTE NON-RECURRENT PANSINUSITIS: ICD-10-CM

## 2024-12-11 DIAGNOSIS — J32.9 SINOBRONCHITIS: Primary | ICD-10-CM

## 2024-12-11 DIAGNOSIS — B99.9 RECURRENT INFECTIONS: ICD-10-CM

## 2024-12-11 DIAGNOSIS — J40 SINOBRONCHITIS: Primary | ICD-10-CM

## 2024-12-11 PROCEDURE — 1036F TOBACCO NON-USER: CPT | Performed by: FAMILY MEDICINE

## 2024-12-11 PROCEDURE — 99214 OFFICE O/P EST MOD 30 MIN: CPT | Performed by: FAMILY MEDICINE

## 2024-12-11 PROCEDURE — 96372 THER/PROPH/DIAG INJ SC/IM: CPT | Performed by: FAMILY MEDICINE

## 2024-12-11 RX ORDER — AMOXICILLIN AND CLAVULANATE POTASSIUM 875; 125 MG/1; MG/1
875 TABLET, FILM COATED ORAL 2 TIMES DAILY
Qty: 28 TABLET | Refills: 0 | Status: SHIPPED | OUTPATIENT
Start: 2024-12-11 | End: 2024-12-25

## 2024-12-11 NOTE — ASSESSMENT & PLAN NOTE
Received Pneumovax 23 in early November of 2024  We repeated your Streptococcus pneumoniae titer 2 days ago, results are pending

## 2024-12-11 NOTE — ASSESSMENT & PLAN NOTE
Based on your history and physical, I believe that your coughing is secondary to your sinus congestion and postnasal drip  Rocephin 1000 mg IM was provided in the office as a jumpstart to treating this infection    Risks, benefits, and options of treatment(s) were discussed after reviewing all current medication(s) and drug allergy(ies)  I opted for the treatment that we discussed with instructions on the medication use for your underlying medical ailment(s)  I encouraged supportive care such as rest, fluids and Advil/Tylenol as warranted  Return to the clinic in 7-10 days or sooner if symptoms worsen or persist as we will then further evaluate

## 2024-12-11 NOTE — ASSESSMENT & PLAN NOTE
Continue to pop your ears throughout the day to help alleviate some of the pressure  Use the inhaler as prescribed in the past  Consider OTC antihistamines

## 2024-12-11 NOTE — PROGRESS NOTES
Subjective   Patient ID: Bree Bojorquez is a 59 y.o. female who presents for Sinusitis and Cough.    Past Medical, Surgical, and Family History reviewed and updated in chart.    Reviewed all medications by prescribing practitioner or clinical pharmacist (such as prescriptions, OTCs, herbal therapies and supplements) and documented in the medical record.    ELISA Hwang was treated in September 2024 for sinusitis with doxycycline and a Medrol Dosepak, which resulted in significant improvement.    Today, she presents with fatigue, a severe productive cough, and congestion persisting for two weeks, symptoms that began after visiting her grandchildren for Thanksgiving. She reports having been ill three times in the last four months, with this current infection being the most severe.    She has been using over-the-counter Tylenol PM and albuterol inhalers, which have provided some relief. However, she notes no significant improvement in her symptoms over the past two weeks, prompting her visit today.    In October 2024, Eri underwent strep pneumo IgG testing, which showed that 18 of the 23 titers were low. This suggests that her immune system may not be adequately protecting her against certain bacterial subtypes, potentially contributing to her recurrent sinus infections. She received the Pneumovax 23 vaccine in November 2024. Repeat lab results are pending.    As an aside, she was at her gynecologist 2 days ago, undergoing her female exam, and a urinalysis revealed some leukocyte esterase so she was treated empirically with Bactrim for a UTI.  Urine culture results shows no significant growth, although Eri is taking the Bactrim as instructed.    Review of Systems  All pertinent positive symptoms are included in the history of present illness.    All other systems have been reviewed and are negative and noncontributory to this patient's current ailments.    Past Medical History:   Diagnosis Date    Adjustment disorder  with depressed mood     Complicated bereavement    Anxiety     Asthma     Basal cell carcinoma     skin cancer to back and chest    Encounter for screening for malignant neoplasm of vagina     Vaginal Pap smear    GERD (gastroesophageal reflux disease)     Hyperlipidemia     Hypertension     Major depressive disorder, recurrent, moderate 03/23/2016    Major depressive disorder, recurrent episode, moderate with anxious distress    Metabolic syndrome     Insulin resistance    Other specified disorders of nose and nasal sinuses 03/27/2020    Sinus pressure    Personal history of other diseases of the respiratory system     History of allergic rhinitis    Personal history of other medical treatment     H/O mammogram    Personal history of other specified conditions 10/15/2019    History of abnormal mammogram    Radiculopathy, cervical region 05/10/2021    Cervical radicular pain    Radiculopathy, cervicothoracic region 05/10/2021    Radiculopathy of cervicothoracic region    Unspecified abnormal findings in urine 10/15/2019    Abnormal finding on urinalysis     Past Surgical History:   Procedure Laterality Date    BREAST SURGERY Right     right breast aspiration of cyst    CATARACT EXTRACTION Bilateral     HYSTERECTOMY      KNEE ARTHROPLASTY Right     9/15/23    KNEE SURGERY  01/10/2014    left knee scope    PARTIAL HYSTERECTOMY      LSH    SALPINGOOPHORECTOMY Left      Social History     Tobacco Use    Smoking status: Never    Smokeless tobacco: Never   Vaping Use    Vaping status: Never Used   Substance Use Topics    Alcohol use: Never    Drug use: Never     Family History   Problem Relation Name Age of Onset    Depression Mother      Hyperlipidemia Mother      Hypertension Mother      Hyperthyroidism Mother      Stroke Mother      Diabetes type II Mother      Coronary artery disease Father      Other (coronary artery surgery) Father      Breast cancer Father's Sister       Immunization History   Administered Date(s)  Administered    COVID-19, mRNA, LNP-S, PF, 30 mcg/0.3 mL dose 03/20/2021, 04/11/2021, 11/23/2021    DTaP, Unspecified 12/09/2018    Flu vaccine (IIV4), preservative free *Check age/dose* 09/17/2016, 10/27/2023    Flu vaccine, quadrivalent, no egg protein, age 6 month or greater (FLUCELVAX) 09/24/2018    Influenza, Unspecified 11/15/2001, 11/04/2002, 10/14/2010, 09/09/2011, 09/20/2015, 10/06/2017, 09/20/2019, 09/04/2020, 09/21/2021, 10/31/2022    Novel influenza-H1N1-09, preservative-free 01/11/2010    Pfizer COVID-19 vaccine, bivalent, age 12 years and older (30 mcg/0.3 mL) 10/31/2022    Pfizer Purple Cap SARS-CoV-2 01/20/2024    Pneumococcal Conjugate PCV 7 1965    Pneumococcal polysaccharide vaccine, 23-valent, age 2 years and older (PNEUMOVAX 23) 11/07/2024    Tdap vaccine, age 7 year and older (BOOSTRIX, ADACEL) 10/15/2012, 12/09/2018    Zoster vaccine, recombinant, adult (SHINGRIX) 02/22/2023, 06/14/2023    Zoster, live 09/21/2016, 10/03/2016     Current Outpatient Medications   Medication Instructions    acetaminophen (TYLENOL) 975 mg, oral, Every 8 hours PRN    albuterol 90 mcg/actuation inhaler 1-2 puffs, inhalation, As needed, Every 4 -6 hours as needed    albuterol 2.5 mg, nebulization, As needed, Every 4-6 hours as needed for wheezing    amoxicillin-pot clavulanate (Augmentin) 875-125 mg tablet 875 mg, oral, 2 times daily    atorvastatin (LIPITOR) 20 mg, oral, Daily    bimatoprost (Latisse) 0.03 % ophthalmic solution use as directed    escitalopram (LEXAPRO) 10 mg, oral, Daily    lisinopril 10 mg, oral, Daily, as directed    meloxicam (MOBIC) 15 mg, oral, Daily with breakfast    metFORMIN XR (GLUCOPHAGE-XR) 750 mg, oral, 2 times daily (morning and late afternoon)    multivitamin tablet 1 tablet, Daily    nitrofurantoin (MACRODANTIN) 100 mg, oral, 2 times daily    ondansetron ODT (ZOFRAN-ODT) 4 mg, oral, Every 8 hours PRN    pantoprazole (ProtoNix) 40 mg EC tablet Take 1 tablet (40 mg) by mouth once  daily as needed for heartburn. Do not crush, chew, or split.    pneumococcal polysaccharide (pneumococcal 23-boaz ps vaccine) 25 mcg/0.5 mL vaccine injection Inject 0.5 mL today     Allergies   Allergen Reactions    Codeine Rash and Nausea/vomiting    Sulfa (Sulfonamide Antibiotics) Rash    Sulfamethoxazole-Trimethoprim Rash     Objective   There were no vitals filed for this visit.  There is no height or weight on file to calculate BMI.    BP Readings from Last 3 Encounters:   12/09/24 148/82   11/06/24 136/80   03/08/24 114/72      Wt Readings from Last 3 Encounters:   12/09/24 79.4 kg (175 lb)   12/09/24 81.6 kg (180 lb)   11/06/24 79.4 kg (175 lb)        Office Visit on 12/09/2024   Component Date Value    POC Glucose, Urine 12/09/2024 NEGATIVE     POC Bilirubin, Urine 12/09/2024 NEGATIVE     POC Ketones, Urine 12/09/2024 NEGATIVE     POC Specific Gravity, Ur* 12/09/2024 1.010     POC Blood, Urine 12/09/2024 NEGATIVE     POC PH, Urine 12/09/2024 5.5     POC Protein, Urine 12/09/2024 NEGATIVE     POC Urobilinogen, Urine 12/09/2024 0.2     Poc Nitrite, Urine 12/09/2024 NEGATIVE     POC Leukocytes, Urine 12/09/2024 TRACE (A)     Urine Culture 12/09/2024 Clinically insignificant growth based on current clinical standards.      Physical Exam  CONSTITUTIONAL - well nourished, well developed, looks like stated age, in no acute distress, not ill-appearing, and not tired appearing  SKIN - normal skin color and pigmentation, normal skin turgor without rash, lesions, or nodules visualized  HEAD - no trauma, normocephalic  ENT - TM's intact, no injection, no signs of infection, uvula midline, normal tongue movement and throat normal, no exudate  CHEST - clear to auscultation, no wheezing, no crackles and no rales, good effort, but harsh coughing throughout exam  CARDIAC - regular rate and regular rhythm, no skipped beats, no murmur  EXTREMITIES - no obvious or evident edema, no obvious or evident deformities  PSYCHIATRIC -  alert, pleasant and cordial, age-appropriate  IMMUNOLOGIC - no cervical lymphadenopathy    Assessment/Plan   Problem List Items Addressed This Visit       Sinobronchitis - Primary     Based on your history and physical, I believe that your coughing is secondary to your sinus congestion and postnasal drip  Rocephin 1000 mg IM was provided in the office as a jumpstart to treating this infection    Risks, benefits, and options of treatment(s) were discussed after reviewing all current medication(s) and drug allergy(ies)  I opted for the treatment that we discussed with instructions on the medication use for your underlying medical ailment(s)  I encouraged supportive care such as rest, fluids and Advil/Tylenol as warranted  Return to the clinic in 7-10 days or sooner if symptoms worsen or persist as we will then further evaluate         Relevant Medications    cefTRIAXone (Rocephin) 1,000 mg in lidocaine (Xylocaine) 4 mL injection (Completed)    Acute non-recurrent pansinusitis     Risks, benefits, and options of treatment(s) were discussed after reviewing all current medication(s) and drug allergy(ies)  I opted for the treatment that we discussed with instructions on the medication use for your underlying medical ailment(s)  I encouraged supportive care such as rest, fluids and Advil/Tylenol as warranted  Return to the clinic in 7-10 days or sooner if symptoms worsen or persist as we will then further evaluate         Relevant Medications    cefTRIAXone (Rocephin) 1,000 mg in lidocaine (Xylocaine) 4 mL injection (Completed)    amoxicillin-pot clavulanate (Augmentin) 875-125 mg tablet    Recurrent infections     Received Pneumovax 23 in early November of 2024  We repeated your Streptococcus pneumoniae titer 2 days ago, results are pending

## 2024-12-12 ENCOUNTER — APPOINTMENT (OUTPATIENT)
Dept: OBSTETRICS AND GYNECOLOGY | Facility: CLINIC | Age: 59
End: 2024-12-12
Payer: COMMERCIAL

## 2024-12-12 DIAGNOSIS — Z23 NEED FOR 23-POLYVALENT PNEUMOCOCCAL POLYSACCHARIDE VACCINE: Primary | ICD-10-CM

## 2024-12-12 DIAGNOSIS — B99.9 RECURRENT INFECTIONS: ICD-10-CM

## 2024-12-12 LAB
S PN DA SERO 19F IGG SER-MCNC: 1.62 UG/ML
S PNEUM DA 1 IGG SER-MCNC: 5.5 UG/ML
S PNEUM DA 10A IGG SER-MCNC: 0.25 UG/ML
S PNEUM DA 11A IGG SER-MCNC: 0.84 UG/ML
S PNEUM DA 12F IGG SER-MCNC: 0.2 UG/ML
S PNEUM DA 14 IGG SER-MCNC: 0.16 UG/ML
S PNEUM DA 15B IGG SER-MCNC: 1.32 UG/ML
S PNEUM DA 17F IGG SER-MCNC: 1.45 UG/ML
S PNEUM DA 18C IGG SER-MCNC: 7.52 UG/ML
S PNEUM DA 19A IGG SER-MCNC: 1.6 UG/ML
S PNEUM DA 2 IGG SER-MCNC: 3.23 UG/ML
S PNEUM DA 20A IGG SER-MCNC: 0.3 UG/ML
S PNEUM DA 22F IGG SER-MCNC: 0.45 UG/ML
S PNEUM DA 23F IGG SER-MCNC: 0.65 UG/ML
S PNEUM DA 3 IGG SER-MCNC: 0.32 UG/ML
S PNEUM DA 33F IGG SER-MCNC: 7.39 UG/ML
S PNEUM DA 4 IGG SER-MCNC: 0.47 UG/ML
S PNEUM DA 5 IGG SER-MCNC: 1.08 UG/ML
S PNEUM DA 6B IGG SER-MCNC: 0.16 UG/ML
S PNEUM DA 7F IGG SER-MCNC: 12.57 UG/ML
S PNEUM DA 8 IGG SER-MCNC: 3.9 UG/ML
S PNEUM DA 9N IGG SER-MCNC: 0.21 UG/ML
S PNEUM DA 9V IGG SER-MCNC: 2.18 UG/ML
S PNEUM SEROTYPE IGG SER-IMP: NORMAL

## 2024-12-12 RX ORDER — PNEUMOCOCCAL VACCINE POLYVALENT 25; 25; 25; 25; 25; 25; 25; 25; 25; 25; 25; 25; 25; 25; 25; 25; 25; 25; 25; 25; 25; 25; 25 UG/.5ML; UG/.5ML; UG/.5ML; UG/.5ML; UG/.5ML; UG/.5ML; UG/.5ML; UG/.5ML; UG/.5ML; UG/.5ML; UG/.5ML; UG/.5ML; UG/.5ML; UG/.5ML; UG/.5ML; UG/.5ML; UG/.5ML; UG/.5ML; UG/.5ML; UG/.5ML; UG/.5ML; UG/.5ML; UG/.5ML
0.5 INJECTION, SOLUTION INTRAMUSCULAR; SUBCUTANEOUS ONCE
Qty: 0.5 ML | Refills: 0 | Status: SHIPPED | OUTPATIENT
Start: 2024-12-12 | End: 2024-12-12

## 2024-12-13 NOTE — RESULT ENCOUNTER NOTE
The blood titer reveals that you are now immune to 10 of the 23, although even the ones you are not immune to, show significant improvement from your previous levels, so I think it is worth trying another vaccine.    I certainly hope you are feeling better, but clearly we do not have your immune system back to normal yet.      I would recommend trying another vaccination with another blood draw 30 days later so I have once again placed that order to the pharmacy for you and the blood work requisition is on the chart to be done in 30 days after the next vaccine.

## 2024-12-16 ENCOUNTER — HOSPITAL ENCOUNTER (OUTPATIENT)
Dept: RADIOLOGY | Facility: CLINIC | Age: 59
Discharge: HOME | End: 2024-12-16
Payer: COMMERCIAL

## 2024-12-16 ENCOUNTER — APPOINTMENT (OUTPATIENT)
Facility: CLINIC | Age: 59
End: 2024-12-16
Payer: COMMERCIAL

## 2024-12-16 VITALS — BODY MASS INDEX: 26.52 KG/M2 | WEIGHT: 175 LBS | HEIGHT: 68 IN

## 2024-12-16 DIAGNOSIS — M19.012 ARTHRITIS OF LEFT SHOULDER REGION: Primary | ICD-10-CM

## 2024-12-16 DIAGNOSIS — M75.42 IMPINGEMENT SYNDROME OF LEFT SHOULDER: ICD-10-CM

## 2024-12-16 DIAGNOSIS — M25.512 LEFT SHOULDER PAIN, UNSPECIFIED CHRONICITY: ICD-10-CM

## 2024-12-16 DIAGNOSIS — M75.22 BICEPS TENDINITIS OF LEFT SHOULDER: ICD-10-CM

## 2024-12-16 PROCEDURE — 20610 DRAIN/INJ JOINT/BURSA W/O US: CPT | Performed by: ORTHOPAEDIC SURGERY

## 2024-12-16 PROCEDURE — 1036F TOBACCO NON-USER: CPT | Performed by: ORTHOPAEDIC SURGERY

## 2024-12-16 PROCEDURE — 99214 OFFICE O/P EST MOD 30 MIN: CPT | Performed by: ORTHOPAEDIC SURGERY

## 2024-12-16 PROCEDURE — 73030 X-RAY EXAM OF SHOULDER: CPT | Mod: LEFT SIDE | Performed by: RADIOLOGY

## 2024-12-16 PROCEDURE — 73030 X-RAY EXAM OF SHOULDER: CPT | Mod: LT

## 2024-12-16 PROCEDURE — 3008F BODY MASS INDEX DOCD: CPT | Performed by: ORTHOPAEDIC SURGERY

## 2024-12-16 RX ORDER — TRIAMCINOLONE ACETONIDE 40 MG/ML
40 INJECTION, SUSPENSION INTRA-ARTICULAR; INTRAMUSCULAR
Status: COMPLETED | OUTPATIENT
Start: 2024-12-16 | End: 2024-12-16

## 2024-12-16 RX ORDER — LIDOCAINE HYDROCHLORIDE 5 MG/ML
4 INJECTION, SOLUTION INFILTRATION; PERINEURAL
Status: COMPLETED | OUTPATIENT
Start: 2024-12-16 | End: 2024-12-16

## 2024-12-16 ASSESSMENT — PAIN SCALES - GENERAL: PAINLEVEL_OUTOF10: 0 - NO PAIN

## 2024-12-16 ASSESSMENT — PAIN - FUNCTIONAL ASSESSMENT: PAIN_FUNCTIONAL_ASSESSMENT: 0-10

## 2024-12-16 NOTE — PROGRESS NOTES
This is a consultation from Dr. Jet Giang  59-year-old female with 1 to 2 years of left shoulder pain.  Patient states there is no specific injury but she started getting pain in her shoulder.  She has noticed that as she has been getting older and having problems with her knees she has been using her arms to help her self out of the chair more.  She saw Dr. Moe 2 years ago and had a steroid injection in the left shoulder.  At that time she had an MRI which did not show any major rotator cuff tearing.  She has been getting worsening in pain in the left shoulder for the last 6 months    Patients' self reported past medical history, medications, allergies, surgical history, family and social history as well as a 10 point review of systems has been documented in the new patient intake form and scanned into the patient's electronic medical record.  The intake form was reviewed by Dr Figueroa during the office visit and signed by Dr. Figueroa and the patient.  Pertinent findings are documented in the HPI.    General Multi-System Physical Exam:  Constitutional  General appearance:  Alert, oriented, and in no acute distress.  Well developed, well nourished.  Head and Face  Head and face:  Normocephalic and atraumatic.  Ears, Nose, Mouth, and Throat  External inspection of ears and nose: Normal.  Eyes:  Pupils are equal and round.  Neck  Neck:  no neck mass was observed.  Pulmonary  Respiratory effort:  no respiratory distress.  Cardiovascular  Intact distal pulses.  Lymphatic  Palpation of lymph nodes in the affected extremity:  Normal.  Skin  Skin and subcutaneous tissue:  Normal skin color and pigmentation.  Normal skin turgor.  No rashes.  Neurologic  Sensation:  normal to light touch.  Psychiatric  Judgement and insight:  Intact.  Mood and affect:  Normal.  Musculoskeletal  Left shoulder is 135 degrees of abduction forward flexion 50 degrees of external rotation internal rotates inferior scapula she has mild  acromioclavicular joint tenderness with mild crossarm test mild positive biceps tenderness positive Neer positive Austin mild Jobes with pain and mild weakness neurovasc intact left upper extremity    X-rays of the patient were ordered by Dr Figueroa and obtained today.  Dr Figueroa personally reviewed the results of the x-rays.    In addition, Dr Figueroa independently interpreted the patient's x-rays (performed by the Radiology department) by viewing the x-ray images and this is Dr. Figueroa's personal interpretation:     X-rays left left shoulders from today were compared for x-rays 2 years ago.  The x-rays show progression of posterior sided glenoid wear but only mild amount of arthritis in the shoulder    We discussed the fact that she has been developing posterior sided glenoid wear and some arthritis in the shoulder.  Will continue to treat her conservatively.  I gave her a glenohumeral joint steroid injection and a prescription for physical therapy for home exercises for her shoulder.  She can see me back on an as-needed basis        This patient has had longstanding pain and weakness in their affected extremity which has gotten worse over the last few months.  Non-operative treatment has failed to help this patient and the pain is worsening.  That would classify this problem as a chronic illness with exacerbation and progression.    Due to this patient's condition, they are at a moderate risk of morbidity from additional diagnostic testing / treatment.      To help them with their pain, I wrote them a prescription for prescription strength anti-inflammatories.  The patient was informed that there are risks of using nonsteroidal antiinflammatory (NSAID) medications.    Risks of NSAIDS include, but are not limited to, upset stomach, ulcers in the stomach and other places in the gastrointestinal tract, and a mild increase in cardiovascular risk as a result of the antiinflammatory medications.  In addition, there is  an increased risk in bleeding as a result of the medications.    The patient was advised to stop taking the NSAIDs if they cause them to have an upset stomach.  NSAIDs are not supposed to be taken every day for more than a few weeks.  If they have any questions or problems with the antiinflammatory medications, they should stop taking the medication immediately and call the office.      Patient ID: Bree Bojorquez is a 59 y.o. female.    L Inj/Asp: L glenohumeral on 12/16/2024 12:54 PM  Indications: pain  Details: 22 G needle, posterior approach  Medications: 40 mg triamcinolone acetonide 40 mg/mL; 4 mL lidocaine 5 mg/mL (0.5 %)  Outcome: tolerated well, no immediate complications  Procedure, treatment alternatives, risks and benefits explained, specific risks discussed. Consent was given by the patient. Immediately prior to procedure a time out was called to verify the correct patient, procedure, equipment, support staff and site/side marked as required. Patient was prepped and draped in the usual sterile fashion.

## 2024-12-19 ENCOUNTER — APPOINTMENT (OUTPATIENT)
Dept: ORTHOPEDIC SURGERY | Facility: CLINIC | Age: 59
End: 2024-12-19
Payer: COMMERCIAL

## 2025-01-06 ENCOUNTER — APPOINTMENT (OUTPATIENT)
Dept: ORTHOPEDIC SURGERY | Facility: CLINIC | Age: 60
End: 2025-01-06
Payer: COMMERCIAL

## 2025-01-06 ENCOUNTER — HOSPITAL ENCOUNTER (OUTPATIENT)
Dept: RADIOLOGY | Facility: CLINIC | Age: 60
Discharge: HOME | End: 2025-01-06
Payer: COMMERCIAL

## 2025-01-06 VITALS — BODY MASS INDEX: 26.52 KG/M2 | HEIGHT: 68 IN | WEIGHT: 175 LBS

## 2025-01-06 DIAGNOSIS — M17.0 PRIMARY OSTEOARTHRITIS OF BOTH KNEES: ICD-10-CM

## 2025-01-06 PROCEDURE — 73560 X-RAY EXAM OF KNEE 1 OR 2: CPT | Mod: 50

## 2025-01-06 PROCEDURE — 99213 OFFICE O/P EST LOW 20 MIN: CPT | Performed by: ORTHOPAEDIC SURGERY

## 2025-01-06 PROCEDURE — 3008F BODY MASS INDEX DOCD: CPT | Performed by: ORTHOPAEDIC SURGERY

## 2025-01-06 PROCEDURE — 73560 X-RAY EXAM OF KNEE 1 OR 2: CPT | Mod: BILATERAL PROCEDURE | Performed by: RADIOLOGY

## 2025-01-06 ASSESSMENT — PAIN - FUNCTIONAL ASSESSMENT: PAIN_FUNCTIONAL_ASSESSMENT: NO/DENIES PAIN

## 2025-01-06 NOTE — PROGRESS NOTES
ORTHOPEDIC TOTAL JOINT  POST-OPERATIVE FOLLOW UP      ============================  IMPRESSION/PLAN:  ============================  59 y.o. female s/p Left Total Knee Replacement completed on 12/29/2023, and S/P Right Total knee replacement completed on 09/15/2023.    PLAN:  Patient is doing excellent her recovery.  Bilateral knee x-rays reviewed with patient.  She has minimal issues at this time and is progressing on track.  She may continue with exercises as tolerated and transition away from therapy if she continues to improve.  She is no activity limitations at this standpoint except for running and jumping for exercise.  Will follow-up as needed at this point        Bree NASRIN Bojorquez presents today for follow up of joint replacement above. Pain controlled with current treatment plan. They are currently ambulating with no assistance.  She states she is progressing well and only has pain kneeling in [employer] and occasional pain.     Review of Systems:   Constitutional: See HPI for pain assessment, No significant weight loss, recent trauma. Denies fevers/chills  Cardiovascular: No chest pain, shortness of breath  Respiratory: No difficulty breathing, cough  Gastrointestinal: No nausea, vomiting, diarrhea, constipation  Musculoskeletal: Noted in HPI, no arthralgias   Integumentary: No rashes, easy bruising, redness   Neurological: no numbness or tingling in extremities, no gait disturbances     Patient Active Problem List   Diagnosis    Anxiety, generalized    Arthritis of knee, right    Benign essential hypertension    Chronic pain of both knees    Elevated alkaline phosphatase level    Excessive sweating    Eyelash scantiness    Acquired pes planus of both feet    Hot flashes, menopausal    Prediabetes    Primary osteoarthritis of both knees    Rotator cuff tendinitis    Tear of left rotator cuff    Vitamin D deficiency    Actinic keratosis    Congenital deformities of feet    Insulin resistance    Mixed  hyperlipidemia    Allergic contact dermatitis due to plants, except food    Hemangioma of skin and subcutaneous tissue    Other specified follicular disorders    Hypertrophic scar    Lichen simplex chronicus    Melanocytic nevi of scalp and neck    Melanocytic nevi of trunk    Melanocytic nevi of unspecified lower limb, including hip    Melanocytic nevi of unspecified upper limb, including shoulder    Other melanin hyperpigmentation    History of basal cell carcinoma    Prurigo nodularis    Rosacea, unspecified    Other seborrheic keratosis    Skin changes due to chronic exposure to nonionizing radiation, unspecified    Palpitations    Bacterial folliculitis    Arthritis of knee, left    Tendinitis of left rotator cuff    Rupture of anterior cruciate ligament of right knee    S/P total knee arthroplasty, right    Asthma    GERD (gastroesophageal reflux disease)    S/P TKR (total knee replacement), left    RLS (restless legs syndrome)    Physical exam, annual    History of squamous cell carcinoma    Sinobronchitis    Acute non-recurrent pansinusitis    Eustachian tube dysfunction, bilateral    Recurrent infections    Need for 23-polyvalent pneumococcal polysaccharide vaccine    Nausea       =================================  EXAM  =================================  GENERAL: A/Ox3, NAD. Appears healthy, well nourished  CARDIAC: regular rate  LUNGS: Breathing non-labored    MUSCULOSKELETAL:  Laterality: Bilateral knee  - Incision: Well-healed bilateral  - ROM: 0 to 115 degrees right knee, 0 to 115 degrees left knee  - Palpation:  minimal tenderness along the knees  - compartments soft, negative homans  - can active straight leg raise with no extensor lag    NEUROVASCULAR:  - Neurovascular Status: sensation intact to light touch distally  - Capillary refill brisk at extremities, Bilateral dorsalis pedis pulse 2+     IMAGIN views of bilateral knees demonstrate no signs of loosening or failure bilateral total knee  arthroplasty. X-rays were personally reviewed by me.  Radiology reports were reviewed by me as well, if available at the time.      Lisa Fuentes DO  Attending Surgeon  Joint Replacement and Adult Reconstructive Surgery  [place], [place]

## 2025-01-09 ENCOUNTER — TELEPHONE (OUTPATIENT)
Dept: PRIMARY CARE | Facility: CLINIC | Age: 60
End: 2025-01-09
Payer: COMMERCIAL

## 2025-01-09 NOTE — TELEPHONE ENCOUNTER
Pt needs letter of necessary for the multivitamins as they can be reimbursed for her HSA along with her  if they have a letter on file for insurance to see

## 2025-01-22 DIAGNOSIS — B99.9 RECURRENT INFECTIONS: Primary | ICD-10-CM

## 2025-01-30 ENCOUNTER — LAB (OUTPATIENT)
Dept: LAB | Facility: HOSPITAL | Age: 60
End: 2025-01-30
Payer: COMMERCIAL

## 2025-01-30 DIAGNOSIS — B99.9 RECURRENT INFECTIONS: ICD-10-CM

## 2025-01-30 LAB
PROT SERPL-MCNC: 6.7 G/DL (ref 6.4–8.2)
PROT UR-ACNC: 11 MG/DL (ref 5–25)

## 2025-01-30 PROCEDURE — 84155 ASSAY OF PROTEIN SERUM: CPT

## 2025-01-30 PROCEDURE — 84166 PROTEIN E-PHORESIS/URINE/CSF: CPT

## 2025-01-30 PROCEDURE — 84156 ASSAY OF PROTEIN URINE: CPT

## 2025-01-30 PROCEDURE — 84165 PROTEIN E-PHORESIS SERUM: CPT

## 2025-01-31 LAB
C TETANI TOXOID AB SER-ACNC: NORMAL [IU]/ML
IGA SERPL-MCNC: 132 MG/DL (ref 47–310)
IGG SERPL-MCNC: 775 MG/DL (ref 600–1640)
IGM SERPL-MCNC: 82 MG/DL (ref 50–300)
QUEST MANNAN BINDING LECTIN: NORMAL
S PN DA SERO 19F IGG SER-MCNC: NORMAL UG/ML
S PNEUM DA 1 IGG SER-MCNC: NORMAL UG/ML
S PNEUM DA 10A IGG SER-MCNC: NORMAL UG/ML
S PNEUM DA 11A IGG SER-MCNC: NORMAL UG/ML
S PNEUM DA 12F IGG SER-MCNC: NORMAL UG/ML
S PNEUM DA 14 IGG SER-MCNC: NORMAL
S PNEUM DA 15B IGG SER-MCNC: NORMAL UG/ML
S PNEUM DA 17F IGG SER-MCNC: NORMAL UG/ML
S PNEUM DA 18C IGG SER-MCNC: NORMAL
S PNEUM DA 19A IGG SER-MCNC: NORMAL UG/ML
S PNEUM DA 2 IGG SER-MCNC: NORMAL UG/ML
S PNEUM DA 20A IGG SER-MCNC: NORMAL UG/ML
S PNEUM DA 22F IGG SER-MCNC: NORMAL UG/ML
S PNEUM DA 23F IGG SER-MCNC: NORMAL UG/ML
S PNEUM DA 3 IGG SER-MCNC: NORMAL UG/ML
S PNEUM DA 33F IGG SER-MCNC: NORMAL UG/ML
S PNEUM DA 4 IGG SER-MCNC: NORMAL UG/ML
S PNEUM DA 5 IGG SER-MCNC: NORMAL UG/ML
S PNEUM DA 6B IGG SER-MCNC: NORMAL UG/ML
S PNEUM DA 7F IGG SER-MCNC: NORMAL UG/ML
S PNEUM DA 8 IGG SER-MCNC: NORMAL UG/ML
S PNEUM DA 9N IGG SER-MCNC: NORMAL UG/ML
S PNEUM DA 9V IGG SER-MCNC: NORMAL UG/ML

## 2025-02-02 LAB
ALBUMIN MFR UR ELPH: 25.9 %
ALBUMIN: 4.4 G/DL (ref 3.4–5)
ALPHA 1 GLOBULIN: 0.3 G/DL (ref 0.2–0.6)
ALPHA 2 GLOBULIN: 0.6 G/DL (ref 0.4–1.1)
ALPHA1 GLOB MFR UR ELPH: 26.3 %
ALPHA2 GLOB MFR UR ELPH: 19.4 %
B-GLOBULIN MFR UR ELPH: 16.1 %
BETA GLOBULIN: 0.7 G/DL (ref 0.5–1.2)
GAMMA GLOB MFR UR ELPH: 12.3 %
GAMMA GLOBULIN: 0.6 G/DL (ref 0.5–1.4)
PATH REVIEW-SERUM PROTEIN ELECTROPHORESIS: NORMAL
PATH REVIEW-URINE PROTEIN ELECTROPHORESIS: NORMAL
PROTEIN ELECTROPHORESIS COMMENT: NORMAL
URINE ELECTROPHORESIS COMMENT: NORMAL

## 2025-02-05 LAB
C TETANI TOXOID AB SER-ACNC: 1.96 IU/ML
IGA SERPL-MCNC: 132 MG/DL (ref 47–310)
IGG SERPL-MCNC: 775 MG/DL (ref 600–1640)
IGM SERPL-MCNC: 82 MG/DL (ref 50–300)
QUEST MANNAN BINDING LECTIN: >1778 NG/ML
S PN DA SERO 19F IGG SER-MCNC: 5.6 UG/ML
S PNEUM DA 1 IGG SER-MCNC: 6.5 UG/ML
S PNEUM DA 10A IGG SER-MCNC: <0.3 UG/ML
S PNEUM DA 11A IGG SER-MCNC: 1.9 UG/ML
S PNEUM DA 12F IGG SER-MCNC: 0.7 UG/ML
S PNEUM DA 14 IGG SER-MCNC: 0.8
S PNEUM DA 15B IGG SER-MCNC: 2 UG/ML
S PNEUM DA 17F IGG SER-MCNC: 2.6 UG/ML
S PNEUM DA 18C IGG SER-MCNC: 16.8
S PNEUM DA 19A IGG SER-MCNC: 12.1 UG/ML
S PNEUM DA 2 IGG SER-MCNC: 3 UG/ML
S PNEUM DA 20A IGG SER-MCNC: 2 UG/ML
S PNEUM DA 22F IGG SER-MCNC: 1.3 UG/ML
S PNEUM DA 23F IGG SER-MCNC: 3.3 UG/ML
S PNEUM DA 3 IGG SER-MCNC: 1.3 UG/ML
S PNEUM DA 33F IGG SER-MCNC: 20 UG/ML
S PNEUM DA 4 IGG SER-MCNC: 0.6 UG/ML
S PNEUM DA 5 IGG SER-MCNC: 1.7 UG/ML
S PNEUM DA 6B IGG SER-MCNC: 0.5 UG/ML
S PNEUM DA 7F IGG SER-MCNC: 22.5 UG/ML
S PNEUM DA 8 IGG SER-MCNC: 15.8 UG/ML
S PNEUM DA 9N IGG SER-MCNC: 0.5 UG/ML
S PNEUM DA 9V IGG SER-MCNC: 4.1 UG/ML

## 2025-02-20 ENCOUNTER — LAB (OUTPATIENT)
Dept: LAB | Facility: HOSPITAL | Age: 60
End: 2025-02-20
Payer: COMMERCIAL

## 2025-02-20 DIAGNOSIS — B99.9 UNSPECIFIED INFECTIOUS DISEASE: Primary | ICD-10-CM

## 2025-02-21 DIAGNOSIS — B99.9 RECURRENT INFECTIONS: Primary | ICD-10-CM

## 2025-02-21 LAB
HOLD SPECIMEN: NORMAL

## 2025-02-28 DIAGNOSIS — H02.729 HYPOTRICHOSIS OF EYELID, UNSPECIFIED LATERALITY: ICD-10-CM

## 2025-03-03 RX ORDER — BIMATOPROST 3 UG/ML
SOLUTION TOPICAL
Qty: 3 ML | Refills: 5 | Status: SHIPPED | OUTPATIENT
Start: 2025-03-03

## 2025-03-04 ENCOUNTER — TELEPHONE (OUTPATIENT)
Dept: OBSTETRICS AND GYNECOLOGY | Facility: CLINIC | Age: 60
End: 2025-03-04
Payer: COMMERCIAL

## 2025-03-04 DIAGNOSIS — B37.31 YEAST VAGINITIS: Primary | ICD-10-CM

## 2025-03-04 RX ORDER — FLUCONAZOLE 150 MG/1
150 TABLET ORAL ONCE
Qty: 1 TABLET | Refills: 0 | Status: SHIPPED | OUTPATIENT
Start: 2025-03-04 | End: 2025-03-04

## 2025-03-04 NOTE — TELEPHONE ENCOUNTER
Bree Bojorquez called in requesting Rx be sent to pharmacy for yeast infection.      Last Office Visit: 12/09/24  Next Office Visit: will schedule closer to December.  Med:  Pharmacy: [facility]/pharmacy #[id number] - [facility] [address] [facility] AT [facility]     Abril Manley MA

## 2025-03-13 DIAGNOSIS — B99.9 RECURRENT INFECTIONS: Primary | ICD-10-CM

## 2025-04-17 NOTE — PROGRESS NOTES
[facility]  Hand and Upper Extremity Service  Follow up visit         Follow up for: Right wrist     Interval History: She returns for her right wrist. She received a right extensor compartment injection at last visit and he did great but her symptoms returned about three or four weeks ago. She is here for a repeat injection. She reports that she intermittently experiences triggering and locking of her right index finger but this is fairly infrequent and has not happened any time recently.               Past medical history, medications, allergies, surgical history and review of systems are reviewed and otherwise unchanged when compared to last visit on 7/9/24         Examination:  Constitutional: Oriented to person, place, and time.  Appears well-developed and well-nourished.  Head: Normocephalic and atraumatic.  Eyes: Pupils are equal, round, and reactive to light.  Cardiovascular: Intact distal pulses.  Pulmonary/Chest/Breast: Effort normal. No respiratory distress.  Neurological: Alert and oriented to person, place, and time.  Skin: Skin is warm and dry.  Psychiatric: normal mood and affect.  Behavior is normal.  Musculoskeletal: Right wrist reveals tenderness over the radial styloid. Positive Finkelstein maneuver. No tenderness over the index finger flexor sheath and is unable to reproduce triggering with locking of the index finger today.       Impression: Right wrist De Quervain's tenosynovitis       Plan: It is possible she has an underlying trigger finger of the index finger as well as this is the same problem that causes De Quervain's tenosynovitis but she is currently asymptomatic with regards to her index finger. I have offered to give her a repeat injection into her first compartment today but if her symptoms continue to return, she may have to consider first dorsal compartment release.       In Office Procedures Performed: Right wrist first dorsal compartment  injection    Hand / UE Inj/Asp: R extensor compartment 1 for de Quervain's tenosynovitis on 4/21/2025 6:48 PM  Indications: tendon swelling and pain  Details: 25 G needle, radial approach  Medications: 20 mg triamcinolone acetonide 40 mg/mL; 0.5 mL lidocaine 10 mg/mL (1 %)  Outcome: tolerated well, no immediate complications  Procedure, treatment alternatives, risks and benefits explained, specific risks discussed. Immediately prior to procedure a time out was called to verify the correct patient, procedure, equipment, support staff and site/side marked as required. Patient was prepped and draped in the usual sterile fashion.             Follow up: As needed             Ziggy De León MD  [facility]  Department of Orthopaedic Surgery  Hand and Upper Extremity Reconstruction    Scribe Attestation  By signing my name below, IDonald , Scribe   attest that this documentation has been prepared under the direction and in the presence of Dr. Ziggy De León.    Dictation performed with the use of voice recognition software.  Syntax and grammatical errors may exist.

## 2025-04-21 ENCOUNTER — APPOINTMENT (OUTPATIENT)
Dept: ORTHOPEDIC SURGERY | Facility: CLINIC | Age: 60
End: 2025-04-21
Payer: COMMERCIAL

## 2025-04-21 VITALS — HEIGHT: 68 IN | BODY MASS INDEX: 26.52 KG/M2 | WEIGHT: 175 LBS

## 2025-04-21 DIAGNOSIS — M65.4 RADIAL STYLOID TENOSYNOVITIS: Primary | ICD-10-CM

## 2025-04-21 PROCEDURE — 99213 OFFICE O/P EST LOW 20 MIN: CPT | Performed by: ORTHOPAEDIC SURGERY

## 2025-04-21 PROCEDURE — 20550 NJX 1 TENDON SHEATH/LIGAMENT: CPT | Performed by: ORTHOPAEDIC SURGERY

## 2025-04-21 PROCEDURE — 3008F BODY MASS INDEX DOCD: CPT | Performed by: ORTHOPAEDIC SURGERY

## 2025-04-21 PROCEDURE — 1036F TOBACCO NON-USER: CPT | Performed by: ORTHOPAEDIC SURGERY

## 2025-04-21 RX ORDER — TRIAMCINOLONE ACETONIDE 40 MG/ML
20 INJECTION, SUSPENSION INTRA-ARTICULAR; INTRAMUSCULAR
Status: COMPLETED | OUTPATIENT
Start: 2025-04-21 | End: 2025-04-21

## 2025-04-21 RX ORDER — LIDOCAINE HYDROCHLORIDE 10 MG/ML
0.5 INJECTION, SOLUTION INFILTRATION; PERINEURAL
Status: COMPLETED | OUTPATIENT
Start: 2025-04-21 | End: 2025-04-21

## 2025-04-21 RX ADMIN — LIDOCAINE HYDROCHLORIDE 0.5 ML: 10 INJECTION, SOLUTION INFILTRATION; PERINEURAL at 18:48

## 2025-04-21 RX ADMIN — TRIAMCINOLONE ACETONIDE 20 MG: 40 INJECTION, SUSPENSION INTRA-ARTICULAR; INTRAMUSCULAR at 18:48

## 2025-04-21 ASSESSMENT — PAIN - FUNCTIONAL ASSESSMENT: PAIN_FUNCTIONAL_ASSESSMENT: 0-10

## 2025-04-21 ASSESSMENT — PAIN SCALES - GENERAL: PAINLEVEL_OUTOF10: 5 - MODERATE PAIN

## 2025-04-21 ASSESSMENT — PAIN DESCRIPTION - DESCRIPTORS: DESCRIPTORS: ACHING;SORE

## 2025-04-24 DIAGNOSIS — F41.1 ANXIETY, GENERALIZED: ICD-10-CM

## 2025-04-24 DIAGNOSIS — R73.03 PREDIABETES: ICD-10-CM

## 2025-04-24 DIAGNOSIS — E88.819 INSULIN RESISTANCE: ICD-10-CM

## 2025-04-24 DIAGNOSIS — I10 BENIGN ESSENTIAL HYPERTENSION: ICD-10-CM

## 2025-04-24 DIAGNOSIS — E78.2 MIXED HYPERLIPIDEMIA: ICD-10-CM

## 2025-04-24 RX ORDER — ESCITALOPRAM OXALATE 10 MG/1
10 TABLET ORAL DAILY
Qty: 30 TABLET | Refills: 5 | OUTPATIENT
Start: 2025-04-24

## 2025-04-24 RX ORDER — ATORVASTATIN CALCIUM 20 MG/1
20 TABLET, FILM COATED ORAL DAILY
Qty: 30 TABLET | Refills: 5 | OUTPATIENT
Start: 2025-04-24

## 2025-04-24 RX ORDER — LISINOPRIL 10 MG/1
10 TABLET ORAL DAILY
Qty: 90 TABLET | Refills: 1 | OUTPATIENT
Start: 2025-04-24 | End: 2025-10-21

## 2025-04-28 RX ORDER — LISINOPRIL 10 MG/1
10 TABLET ORAL DAILY
Qty: 30 TABLET | Refills: 0 | Status: SHIPPED | OUTPATIENT
Start: 2025-04-28 | End: 2025-05-28

## 2025-04-28 RX ORDER — METFORMIN HYDROCHLORIDE 750 MG/1
750 TABLET, EXTENDED RELEASE ORAL
Qty: 60 TABLET | Refills: 0 | Status: SHIPPED | OUTPATIENT
Start: 2025-04-28 | End: 2025-05-28

## 2025-04-28 RX ORDER — ATORVASTATIN CALCIUM 20 MG/1
20 TABLET, FILM COATED ORAL DAILY
Qty: 30 TABLET | Refills: 0 | Status: SHIPPED | OUTPATIENT
Start: 2025-04-28 | End: 2025-05-28

## 2025-04-29 DIAGNOSIS — Z13.0 SCREENING FOR BLOOD DISEASE: ICD-10-CM

## 2025-04-29 DIAGNOSIS — Z00.00 ANNUAL PHYSICAL EXAM: ICD-10-CM

## 2025-04-29 DIAGNOSIS — Z13.1 SCREENING FOR DIABETES MELLITUS: ICD-10-CM

## 2025-04-29 DIAGNOSIS — Z13.6 SCREENING FOR CARDIOVASCULAR CONDITION: ICD-10-CM

## 2025-04-29 DIAGNOSIS — R73.03 PREDIABETES: ICD-10-CM

## 2025-04-29 DIAGNOSIS — E55.9 VITAMIN D DEFICIENCY: ICD-10-CM

## 2025-04-29 DIAGNOSIS — Z13.29 SCREENING FOR THYROID DISORDER: ICD-10-CM

## 2025-04-29 DIAGNOSIS — R74.8 ELEVATED ALKALINE PHOSPHATASE LEVEL: ICD-10-CM

## 2025-04-29 DIAGNOSIS — E78.2 MIXED HYPERLIPIDEMIA: ICD-10-CM

## 2025-04-29 DIAGNOSIS — I10 BENIGN ESSENTIAL HYPERTENSION: Primary | ICD-10-CM

## 2025-05-07 LAB
25(OH)D3+25(OH)D2 SERPL-MCNC: 32 NG/ML (ref 30–100)
ALBUMIN SERPL-MCNC: 4.7 G/DL (ref 3.6–5.1)
ALP SERPL-CCNC: 135 U/L (ref 37–153)
ALT SERPL-CCNC: 19 U/L (ref 6–29)
ANION GAP SERPL CALCULATED.4IONS-SCNC: 11 MMOL/L (CALC) (ref 7–17)
AST SERPL-CCNC: 19 U/L (ref 10–35)
BASOPHILS # BLD AUTO: 67 CELLS/UL (ref 0–200)
BASOPHILS NFR BLD AUTO: 0.9 %
BILIRUB SERPL-MCNC: 0.7 MG/DL (ref 0.2–1.2)
BUN SERPL-MCNC: 17 MG/DL (ref 7–25)
CALCIUM SERPL-MCNC: 9.8 MG/DL (ref 8.6–10.4)
CHLORIDE SERPL-SCNC: 105 MMOL/L (ref 98–110)
CHOLEST SERPL-MCNC: 175 MG/DL
CHOLEST/HDLC SERPL: 3.2 (CALC)
CO2 SERPL-SCNC: 24 MMOL/L (ref 20–32)
CREAT SERPL-MCNC: 0.79 MG/DL (ref 0.5–1.03)
EGFRCR SERPLBLD CKD-EPI 2021: 86 ML/MIN/1.73M2
EOSINOPHIL # BLD AUTO: 96 CELLS/UL (ref 15–500)
EOSINOPHIL NFR BLD AUTO: 1.3 %
ERYTHROCYTE [DISTWIDTH] IN BLOOD BY AUTOMATED COUNT: 11.9 % (ref 11–15)
EST. AVERAGE GLUCOSE BLD GHB EST-MCNC: 131 MG/DL
EST. AVERAGE GLUCOSE BLD GHB EST-SCNC: 7.3 MMOL/L
GLUCOSE SERPL-MCNC: 119 MG/DL (ref 65–99)
HBA1C MFR BLD: 6.2 %
HCT VFR BLD AUTO: 44.4 % (ref 35–45)
HDLC SERPL-MCNC: 54 MG/DL
HGB BLD-MCNC: 14.6 G/DL (ref 11.7–15.5)
LDLC SERPL CALC-MCNC: 92 MG/DL (CALC)
LYMPHOCYTES # BLD AUTO: 1606 CELLS/UL (ref 850–3900)
LYMPHOCYTES NFR BLD AUTO: 21.7 %
MCH RBC QN AUTO: 29.6 PG (ref 27–33)
MCHC RBC AUTO-ENTMCNC: 32.9 G/DL (ref 32–36)
MCV RBC AUTO: 90.1 FL (ref 80–100)
MONOCYTES # BLD AUTO: 355 CELLS/UL (ref 200–950)
MONOCYTES NFR BLD AUTO: 4.8 %
NEUTROPHILS # BLD AUTO: 5276 CELLS/UL (ref 1500–7800)
NEUTROPHILS NFR BLD AUTO: 71.3 %
NONHDLC SERPL-MCNC: 121 MG/DL (CALC)
PLATELET # BLD AUTO: 254 THOUSAND/UL (ref 140–400)
PMV BLD REES-ECKER: 10.2 FL (ref 7.5–12.5)
POTASSIUM SERPL-SCNC: 4.5 MMOL/L (ref 3.5–5.3)
PROT SERPL-MCNC: 6.9 G/DL (ref 6.1–8.1)
RBC # BLD AUTO: 4.93 MILLION/UL (ref 3.8–5.1)
SODIUM SERPL-SCNC: 140 MMOL/L (ref 135–146)
TRIGL SERPL-MCNC: 194 MG/DL
TSH SERPL-ACNC: 1.43 MIU/L (ref 0.4–4.5)
WBC # BLD AUTO: 7.4 THOUSAND/UL (ref 3.8–10.8)

## 2025-05-21 ENCOUNTER — APPOINTMENT (OUTPATIENT)
Dept: DERMATOLOGY | Facility: CLINIC | Age: 60
End: 2025-05-21
Payer: COMMERCIAL

## 2025-05-21 ENCOUNTER — OFFICE VISIT (OUTPATIENT)
Dept: PRIMARY CARE | Facility: CLINIC | Age: 60
End: 2025-05-21
Payer: COMMERCIAL

## 2025-05-21 VITALS
SYSTOLIC BLOOD PRESSURE: 122 MMHG | DIASTOLIC BLOOD PRESSURE: 72 MMHG | WEIGHT: 176 LBS | BODY MASS INDEX: 26.67 KG/M2 | HEIGHT: 68 IN | HEART RATE: 85 BPM

## 2025-05-21 DIAGNOSIS — J45.20 MILD INTERMITTENT ASTHMA WITHOUT COMPLICATION (HHS-HCC): ICD-10-CM

## 2025-05-21 DIAGNOSIS — E88.819 INSULIN RESISTANCE: ICD-10-CM

## 2025-05-21 DIAGNOSIS — E78.2 MIXED HYPERLIPIDEMIA: ICD-10-CM

## 2025-05-21 DIAGNOSIS — L81.4 LENTIGO: ICD-10-CM

## 2025-05-21 DIAGNOSIS — Z85.89 HISTORY OF SQUAMOUS CELL CARCINOMA: ICD-10-CM

## 2025-05-21 DIAGNOSIS — R73.03 PREDIABETES: ICD-10-CM

## 2025-05-21 DIAGNOSIS — D18.01 CHERRY ANGIOMA: ICD-10-CM

## 2025-05-21 DIAGNOSIS — Z00.00 ANNUAL PHYSICAL EXAM: Primary | ICD-10-CM

## 2025-05-21 DIAGNOSIS — L71.9 ROSACEA: ICD-10-CM

## 2025-05-21 DIAGNOSIS — I10 BENIGN ESSENTIAL HYPERTENSION: ICD-10-CM

## 2025-05-21 DIAGNOSIS — L90.5 SCAR CONDITIONS AND FIBROSIS OF SKIN: ICD-10-CM

## 2025-05-21 DIAGNOSIS — D22.9 MULTIPLE BENIGN MELANOCYTIC NEVI: ICD-10-CM

## 2025-05-21 DIAGNOSIS — K21.9 GASTROESOPHAGEAL REFLUX DISEASE WITHOUT ESOPHAGITIS: ICD-10-CM

## 2025-05-21 DIAGNOSIS — L82.1 SEBORRHEIC KERATOSES: ICD-10-CM

## 2025-05-21 DIAGNOSIS — F41.1 ANXIETY, GENERALIZED: ICD-10-CM

## 2025-05-21 DIAGNOSIS — Z12.83 SCREENING EXAM FOR SKIN CANCER: ICD-10-CM

## 2025-05-21 DIAGNOSIS — D48.5 NEOPLASM OF UNCERTAIN BEHAVIOR OF SKIN: Primary | ICD-10-CM

## 2025-05-21 PROCEDURE — 93000 ELECTROCARDIOGRAM COMPLETE: CPT | Performed by: FAMILY MEDICINE

## 2025-05-21 PROCEDURE — 3078F DIAST BP <80 MM HG: CPT | Performed by: FAMILY MEDICINE

## 2025-05-21 PROCEDURE — 99396 PREV VISIT EST AGE 40-64: CPT | Performed by: FAMILY MEDICINE

## 2025-05-21 PROCEDURE — 3008F BODY MASS INDEX DOCD: CPT | Performed by: FAMILY MEDICINE

## 2025-05-21 PROCEDURE — 3074F SYST BP LT 130 MM HG: CPT | Performed by: FAMILY MEDICINE

## 2025-05-21 PROCEDURE — 99214 OFFICE O/P EST MOD 30 MIN: CPT | Performed by: FAMILY MEDICINE

## 2025-05-21 PROCEDURE — 99213 OFFICE O/P EST LOW 20 MIN: CPT | Performed by: DERMATOLOGY

## 2025-05-21 PROCEDURE — 1036F TOBACCO NON-USER: CPT | Performed by: FAMILY MEDICINE

## 2025-05-21 PROCEDURE — 1036F TOBACCO NON-USER: CPT | Performed by: DERMATOLOGY

## 2025-05-21 PROCEDURE — 11102 TANGNTL BX SKIN SINGLE LES: CPT | Performed by: STUDENT IN AN ORGANIZED HEALTH CARE EDUCATION/TRAINING PROGRAM

## 2025-05-21 RX ORDER — ESCITALOPRAM OXALATE 5 MG/1
TABLET ORAL
Qty: 21 TABLET | Refills: 0 | Status: SHIPPED | OUTPATIENT
Start: 2025-05-21 | End: 2025-06-18

## 2025-05-21 RX ORDER — ATORVASTATIN CALCIUM 20 MG/1
20 TABLET, FILM COATED ORAL DAILY
Qty: 90 TABLET | Refills: 1 | Status: SHIPPED | OUTPATIENT
Start: 2025-05-21 | End: 2025-11-17

## 2025-05-21 RX ORDER — PANTOPRAZOLE SODIUM 40 MG/1
40 TABLET, DELAYED RELEASE ORAL DAILY PRN
Qty: 90 TABLET | Refills: 1 | Status: SHIPPED | OUTPATIENT
Start: 2025-05-21 | End: 2025-11-17

## 2025-05-21 RX ORDER — LISINOPRIL 10 MG/1
10 TABLET ORAL DAILY
Qty: 90 TABLET | Refills: 1 | Status: SHIPPED | OUTPATIENT
Start: 2025-05-21 | End: 2025-11-17

## 2025-05-21 RX ORDER — METFORMIN HYDROCHLORIDE 750 MG/1
750 TABLET, EXTENDED RELEASE ORAL
Qty: 180 TABLET | Refills: 1 | Status: SHIPPED | OUTPATIENT
Start: 2025-05-21 | End: 2025-11-17

## 2025-05-21 ASSESSMENT — DERMATOLOGY QUALITY OF LIFE (QOL) ASSESSMENT
WHAT SINGLE SKIN CONDITION LISTED BELOW IS THE PATIENT ANSWERING THE QUALITY-OF-LIFE ASSESSMENT QUESTIONS ABOUT: NONE OF THE ABOVE
RATE HOW EMOTIONALLY BOTHERED YOU ARE BY YOUR SKIN PROBLEM (FOR EXAMPLE, WORRY, EMBARRASSMENT, FRUSTRATION): 0 - NEVER BOTHERED
ARE THERE EXCLUSIONS OR EXCEPTIONS FOR THE QUALITY OF LIFE ASSESSMENT: NO
RATE HOW BOTHERED YOU ARE BY EFFECTS OF YOUR SKIN PROBLEMS ON YOUR ACTIVITIES (EG, GOING OUT, ACCOMPLISHING WHAT YOU WANT, WORK ACTIVITIES OR YOUR RELATIONSHIPS WITH OTHERS): 0 - NEVER BOTHERED
RATE HOW BOTHERED YOU ARE BY SYMPTOMS OF YOUR SKIN PROBLEM (EG, ITCHING, STINGING BURNING, HURTING OR SKIN IRRITATION): 0 - NEVER BOTHERED
DATE THE QUALITY-OF-LIFE ASSESSMENT WAS COMPLETED: 67346

## 2025-05-21 ASSESSMENT — DERMATOLOGY PATIENT ASSESSMENT
ARE YOU TRYING TO GET PREGNANT: NO
HAVE YOU HAD OR DO YOU HAVE A STAPH INFECTION: NO
DO YOU HAVE IRREGULAR MENSTRUAL CYCLES: NO
DO YOU USE A TANNING BED: YES, PREVIOUSLY
ARE YOU AN ORGAN TRANSPLANT RECIPIENT: NO
ARE YOU ON BIRTH CONTROL: NO
DO YOU HAVE ANY NEW OR CHANGING LESIONS: NO
DO YOU USE SUNSCREEN: DAILY
HAVE YOU HAD OR DO YOU HAVE VASCULAR DISEASE: NO

## 2025-05-21 ASSESSMENT — ITCH NUMERIC RATING SCALE: HOW SEVERE IS YOUR ITCHING?: 0

## 2025-05-21 ASSESSMENT — PATIENT GLOBAL ASSESSMENT (PGA): PATIENT GLOBAL ASSESSMENT: PATIENT GLOBAL ASSESSMENT:  1 - CLEAR

## 2025-05-23 LAB
LABORATORY COMMENT REPORT: NORMAL
PATH REPORT.FINAL DX SPEC: NORMAL
PATH REPORT.GROSS SPEC: NORMAL
PATH REPORT.MICROSCOPIC SPEC OTHER STN: NORMAL
PATH REPORT.RELEVANT HX SPEC: NORMAL
PATH REPORT.TOTAL CANCER: NORMAL

## 2025-07-23 ENCOUNTER — APPOINTMENT (OUTPATIENT)
Dept: DERMATOLOGY | Facility: CLINIC | Age: 60
End: 2025-07-23
Payer: COMMERCIAL

## 2025-07-23 DIAGNOSIS — L82.1 SEBORRHEIC KERATOSES: ICD-10-CM

## 2025-07-23 DIAGNOSIS — D48.9 NEOPLASM OF UNCERTAIN BEHAVIOR: Primary | ICD-10-CM

## 2025-07-23 DIAGNOSIS — L57.0 ACTINIC KERATOSIS: ICD-10-CM

## 2025-07-23 PROCEDURE — 17000 DESTRUCT PREMALG LESION: CPT

## 2025-07-23 PROCEDURE — 99213 OFFICE O/P EST LOW 20 MIN: CPT | Performed by: DERMATOLOGY

## 2025-07-23 PROCEDURE — 1036F TOBACCO NON-USER: CPT | Performed by: DERMATOLOGY

## 2025-07-23 ASSESSMENT — DERMATOLOGY PATIENT ASSESSMENT
DO YOU USE SUNSCREEN: DAILY
ARE YOU AN ORGAN TRANSPLANT RECIPIENT: NO
ARE YOU TRYING TO GET PREGNANT: NO
DO YOU HAVE IRREGULAR MENSTRUAL CYCLES: NO
HAVE YOU HAD OR DO YOU HAVE A STAPH INFECTION: NO
DO YOU USE A TANNING BED: NO
HAVE YOU HAD OR DO YOU HAVE VASCULAR DISEASE: NO
DO YOU HAVE ANY NEW OR CHANGING LESIONS: NO
ARE YOU ON BIRTH CONTROL: NO

## 2025-07-23 ASSESSMENT — DERMATOLOGY QUALITY OF LIFE (QOL) ASSESSMENT
RATE HOW BOTHERED YOU ARE BY SYMPTOMS OF YOUR SKIN PROBLEM (EG, ITCHING, STINGING BURNING, HURTING OR SKIN IRRITATION): 0 - NEVER BOTHERED
DATE THE QUALITY-OF-LIFE ASSESSMENT WAS COMPLETED: 67409
WHAT SINGLE SKIN CONDITION LISTED BELOW IS THE PATIENT ANSWERING THE QUALITY-OF-LIFE ASSESSMENT QUESTIONS ABOUT: NONE OF THE ABOVE
ARE THERE EXCLUSIONS OR EXCEPTIONS FOR THE QUALITY OF LIFE ASSESSMENT: NO
RATE HOW EMOTIONALLY BOTHERED YOU ARE BY YOUR SKIN PROBLEM (FOR EXAMPLE, WORRY, EMBARRASSMENT, FRUSTRATION): 0 - NEVER BOTHERED
RATE HOW BOTHERED YOU ARE BY EFFECTS OF YOUR SKIN PROBLEMS ON YOUR ACTIVITIES (EG, GOING OUT, ACCOMPLISHING WHAT YOU WANT, WORK ACTIVITIES OR YOUR RELATIONSHIPS WITH OTHERS): 0 - NEVER BOTHERED

## 2025-07-23 ASSESSMENT — PATIENT GLOBAL ASSESSMENT (PGA): PATIENT GLOBAL ASSESSMENT: PATIENT GLOBAL ASSESSMENT:  1 - CLEAR

## 2025-07-23 ASSESSMENT — ITCH NUMERIC RATING SCALE: HOW SEVERE IS YOUR ITCHING?: 0

## 2025-07-23 NOTE — Clinical Note
-Patient admits to picking/scratching the lesion. Has been present for 2 weeks.   -Ulceration may be secondary to trauma. Due to excoriation, difficult to confirm diagnosis although favor a traumatized benign lesion at this time. Advise patient to notify us if the lesion is changing, growing, bleeding, or otherwise symptomatic. [facility] if this is not resolved in the next 1-2 months for possible biopsy.

## 2025-07-23 NOTE — PROGRESS NOTES
Subjective     Bree Bojorquez is a 59 y.o. female who presents for the following: Actinic Keratosis (Right shoulder biopsy on 5/21/2025.).  Returns for re-evaluation and cryotherapy today. Two other new spots to check, one on each shoulder    Skin Cancer History  Biopsy Log Book  No skin cancers from Specimen Tracking.    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 Padilla on 2/5/21  - Basal cell carcinoma on left upper back treated by Dr. Oviedo in March 2013  - No h/o melanoma    Review of Systems:  No other skin or systemic complaints other than what is documented elsewhere in the note.    The following portions of the chart were reviewed this encounter and updated as appropriate:       Specialty Problems          Dermatology Problems    Actinic keratosis    Bacterial folliculitis    Hemangioma of skin and subcutaneous tissue    History of basal cell carcinoma    Reported h/o basal cell carcinoma left upper back treated with Dr. Oviedo 2013         Hypertrophic scar    Lichen simplex chronicus    Melanocytic nevi of scalp and neck    Melanocytic nevi of trunk    Melanocytic nevi of unspecified lower limb, including hip    Melanocytic nevi of unspecified upper limb, including shoulder    Other melanin hyperpigmentation    Other seborrheic keratosis    Other specified follicular disorders    Prurigo nodularis    Rosacea, unspecified    Skin changes due to chronic exposure to nonionizing radiation, unspecified    Eyelash scantiness     Past Medical History:  Bree Bojorquez  has a past medical history of Adjustment disorder with depressed mood, Anxiety, Asthma, Basal cell carcinoma, Encounter for screening for malignant neoplasm of vagina, GERD (gastroesophageal reflux disease), Hyperlipidemia, Hypertension, Major depressive disorder, recurrent, moderate (03/23/2016), Metabolic syndrome, Other specified disorders of nose and nasal sinuses  (03/27/2020), Personal history of other diseases of the respiratory system, Personal history of other medical treatment, Personal history of other specified conditions (10/15/2019), Radiculopathy, cervical region (05/10/2021), Radiculopathy, cervicothoracic region (05/10/2021), and Unspecified abnormal findings in urine (10/15/2019).    Past Surgical History:  Bree Bojorquez  has a past surgical history that includes Knee surgery (Left, 01/10/2014); Partial hysterectomy; Salpingoophorectomy (Left); Knee Arthroplasty (Right, 09/15/2023); Cataract extraction (Bilateral); Breast surgery (Right); Hysterectomy; and Knee Arthroplasty (Left, 12/29/2023).    Family History:  Patient family history includes Breast cancer in her father's sister; Coronary artery disease in her father; Depression in her mother; Diabetes type II in her mother; Hyperlipidemia in her mother; Hypertension in her mother; Hyperthyroidism in her mother; Stroke in her mother; coronary artery surgery in her father.    Social History:  Bree Bojorquez  reports that she has never smoked. She has never used smokeless tobacco. She reports that she does not drink alcohol and does not use drugs.    Allergies:  Codeine, Sulfa (sulfonamide antibiotics), and Sulfamethoxazole-trimethoprim    Current Medications / CAM's:  Current Medications[1]     Objective   Well appearing patient in no apparent distress; mood and affect are within normal limits.    A focused examination was performed of the shoulders. All findings within normal limits unless otherwise noted below.    Right Shoulder - Anterior  Biopsy proven actinic keratosis [id number] with involvement on the deep/peripheral margin.  Left posterior shoulder  6mm pink papule with central ulceration secondary to scratching  Neck - Anterior  Keratotic papule       Assessment/Plan   ACTINIC KERATOSIS  Right Shoulder - Anterior  - The premalignant nature of the disorder was reviewed and treatment options were  reviewed.   - Patient agreeable to treatment with cryotherapy today.  Sites confirmed. Risks and benefits reviewed including but not limited to pain, redness, swelling, blister, scab, healing with hypo or hyperpigmentation, and scar. Chance of recurrence or persistence reviewed.   - Destr of lesion - Right Shoulder - Anterior  Complexity: simple    Destruction method: cryotherapy    Informed consent: discussed and consent obtained    Lesion destroyed using liquid nitrogen: Yes    Region frozen until ice ball extended beyond lesion: Yes    Cryotherapy cycles:  1  Outcome: patient tolerated procedure well with no complications    Post-procedure details: wound care instructions given      NEOPLASM OF UNCERTAIN BEHAVIOR  Left posterior shoulder  -Patient admits to picking/scratching the lesion. Has been present for 2 weeks.   -Ulceration may be secondary to trauma. Due to excoriation, difficult to confirm diagnosis although favor a traumatized benign lesion at this time. Advise patient to notify us if the lesion is changing, growing, bleeding, or otherwise symptomatic. Recommended reaching out over MyChart for an appointment if this is not resolved in the next 1-2 months for possible biopsy.  SEBORRHEIC KERATOSES  Neck - Anterior  Inflamed seborrheic keratosis favored, cryotherapy as courtesy     Follow up as scheduled in May 2026 for total body skin check.    Leah Boothe MD  Department of Dermatology    I saw and evaluated the patient, participating in the key elements of the service.  I discussed the findings, assessment and plan with the resident and agree with resident’s findings and plan as documented in the resident's note.  I was immediately available for the entirety of the procedure(s) and present for the key and critical portions.     Katya Frey MD        [1]   Current Outpatient Medications:     acetaminophen (Tylenol) 325 mg tablet, Take 3 tablets (975 mg) by mouth every 8 hours if needed for mild  pain (1 - 3)., Disp: 90 tablet, Rfl: 1    albuterol 2.5 mg /3 mL (0.083 %) nebulizer solution, Take 3 mL (2.5 mg) by nebulization if needed for wheezing. Every 4-6 hours as needed for wheezing, Disp: 75 mL, Rfl: 1    albuterol 90 mcg/actuation inhaler, Inhale 1-2 puffs if needed for wheezing. Every 4 -6 hours as needed, Disp: 18 g, Rfl: 3    atorvastatin (Lipitor) 20 mg tablet, Take 1 tablet (20 mg) by mouth once daily., Disp: 90 tablet, Rfl: 1    bimatoprost (Latisse) 0.03 % ophthalmic solution, USE AS DIRECTED, Disp: 3 mL, Rfl: 5    lisinopril 10 mg tablet, Take 1 tablet (10 mg) by mouth once daily. as directed, Disp: 90 tablet, Rfl: 1    metFORMIN XR (Glucophage-XR) 750 mg 24 hr tablet, Take 1 tablet (750 mg) by mouth 2 times daily (morning and late afternoon)., Disp: 180 tablet, Rfl: 1    multivitamin tablet, Take 1 tablet by mouth once daily., Disp: , Rfl:     ondansetron ODT (Zofran-ODT) 4 mg disintegrating tablet, Take 1 tablet (4 mg) by mouth every 8 hours if needed for nausea or vomiting., Disp: 20 tablet, Rfl: 1    pantoprazole (ProtoNix) 40 mg EC tablet, Take 1 tablet (40 mg) by mouth once daily as needed (heartburn)., Disp: 90 tablet, Rfl: 1    escitalopram (Lexapro) 5 mg tablet, Take 1 tablet (5 mg) by mouth once daily for 14 days, THEN 1 tablet (5 mg) every other day for 14 days., Disp: 21 tablet, Rfl: 0

## 2025-08-07 DIAGNOSIS — R73.03 PREDIABETES: ICD-10-CM

## 2025-08-09 LAB
EST. AVERAGE GLUCOSE BLD GHB EST-MCNC: 128 MG/DL
EST. AVERAGE GLUCOSE BLD GHB EST-SCNC: 7.1 MMOL/L
HBA1C MFR BLD: 6.1 %

## 2025-08-11 ENCOUNTER — APPOINTMENT (OUTPATIENT)
Dept: PRIMARY CARE | Facility: CLINIC | Age: 60
End: 2025-08-11
Payer: COMMERCIAL

## 2025-08-11 VITALS
HEIGHT: 68 IN | BODY MASS INDEX: 26.83 KG/M2 | DIASTOLIC BLOOD PRESSURE: 80 MMHG | HEART RATE: 68 BPM | WEIGHT: 177 LBS | SYSTOLIC BLOOD PRESSURE: 124 MMHG

## 2025-08-11 DIAGNOSIS — R73.03 PREDIABETES: ICD-10-CM

## 2025-08-11 DIAGNOSIS — F41.1 ANXIETY, GENERALIZED: Primary | ICD-10-CM

## 2025-08-11 DIAGNOSIS — E78.2 MIXED HYPERLIPIDEMIA: ICD-10-CM

## 2025-08-11 PROCEDURE — 3074F SYST BP LT 130 MM HG: CPT

## 2025-08-11 PROCEDURE — 3079F DIAST BP 80-89 MM HG: CPT

## 2025-08-11 PROCEDURE — 1036F TOBACCO NON-USER: CPT

## 2025-08-11 PROCEDURE — 99213 OFFICE O/P EST LOW 20 MIN: CPT

## 2025-08-11 PROCEDURE — 3008F BODY MASS INDEX DOCD: CPT

## 2025-08-29 ENCOUNTER — APPOINTMENT (OUTPATIENT)
Dept: ORTHOPEDIC SURGERY | Facility: CLINIC | Age: 60
End: 2025-08-29
Payer: COMMERCIAL

## 2025-08-29 VITALS — WEIGHT: 170 LBS | HEIGHT: 68 IN | BODY MASS INDEX: 25.76 KG/M2

## 2025-08-29 DIAGNOSIS — M25.512 LEFT SHOULDER PAIN, UNSPECIFIED CHRONICITY: Primary | ICD-10-CM

## 2025-08-29 DIAGNOSIS — M67.912 DISORDER OF LEFT ROTATOR CUFF: ICD-10-CM

## 2025-08-29 PROCEDURE — 3008F BODY MASS INDEX DOCD: CPT | Performed by: STUDENT IN AN ORGANIZED HEALTH CARE EDUCATION/TRAINING PROGRAM

## 2025-08-29 PROCEDURE — 1036F TOBACCO NON-USER: CPT | Performed by: STUDENT IN AN ORGANIZED HEALTH CARE EDUCATION/TRAINING PROGRAM

## 2025-08-29 PROCEDURE — 20610 DRAIN/INJ JOINT/BURSA W/O US: CPT | Performed by: STUDENT IN AN ORGANIZED HEALTH CARE EDUCATION/TRAINING PROGRAM

## 2025-08-29 PROCEDURE — 99214 OFFICE O/P EST MOD 30 MIN: CPT | Performed by: STUDENT IN AN ORGANIZED HEALTH CARE EDUCATION/TRAINING PROGRAM

## 2025-08-29 RX ORDER — LIDOCAINE HYDROCHLORIDE 10 MG/ML
5 INJECTION, SOLUTION INFILTRATION; PERINEURAL
Status: COMPLETED | OUTPATIENT
Start: 2025-08-29 | End: 2025-08-29

## 2025-08-29 RX ORDER — TRIAMCINOLONE ACETONIDE 40 MG/ML
80 INJECTION, SUSPENSION INTRA-ARTICULAR; INTRAMUSCULAR
Status: COMPLETED | OUTPATIENT
Start: 2025-08-29 | End: 2025-08-29

## 2025-08-29 RX ADMIN — LIDOCAINE HYDROCHLORIDE 5 ML: 10 INJECTION, SOLUTION INFILTRATION; PERINEURAL at 13:27

## 2025-08-29 RX ADMIN — TRIAMCINOLONE ACETONIDE 80 MG: 40 INJECTION, SUSPENSION INTRA-ARTICULAR; INTRAMUSCULAR at 13:27

## 2025-08-29 ASSESSMENT — PAIN - FUNCTIONAL ASSESSMENT: PAIN_FUNCTIONAL_ASSESSMENT: NO/DENIES PAIN

## 2025-09-08 ENCOUNTER — APPOINTMENT (OUTPATIENT)
Facility: CLINIC | Age: 60
End: 2025-09-08
Payer: COMMERCIAL

## 2025-12-01 ENCOUNTER — APPOINTMENT (OUTPATIENT)
Dept: PRIMARY CARE | Facility: CLINIC | Age: 60
End: 2025-12-01
Payer: COMMERCIAL

## 2026-05-27 ENCOUNTER — APPOINTMENT (OUTPATIENT)
Dept: DERMATOLOGY | Facility: CLINIC | Age: 61
End: 2026-05-27
Payer: COMMERCIAL

## (undated) DEVICE — GLOVE, SURGICAL, PROTEXIS PI , 7.5, PF, LF

## (undated) DEVICE — BLADE, SAGITTAL DUAL CUT, 25 X 90 X 1.27

## (undated) DEVICE — CHECKPOINT KIT, FEMORAL/ TIBIAL

## (undated) DEVICE — CATHETER, SUCTION, CATH-N-GLOVE, PEEL POUCH, 18 FR

## (undated) DEVICE — CUFF, TOURNIQUET, 30 X 4, DUAL PORT/SNGL BLADDER, DISP, LF

## (undated) DEVICE — SUTURE, VICRYL, 1, 36 IN, CT-1, UNDYED

## (undated) DEVICE — CATHETER TRAY, SURESTEP, 14FR, PRECONNECTED DRAIN BAG, PVC

## (undated) DEVICE — DRAPE, INSTRUMENT, W/POUCH, STERI DRAPE, 7 X 11 IN, DISPOSABLE, STERILE

## (undated) DEVICE — PROTECTIVE, FOOT, FOAM PAD & COHESIVE WRAP, STERILE

## (undated) DEVICE — SYRINGE, 50 CC, LUER LOCK

## (undated) DEVICE — Device

## (undated) DEVICE — GLOVE, SURGICAL, PROTEXIS PI BLUE W/NEUTHERA, 8.0, PF, LF

## (undated) DEVICE — BLADE, MAKO, SAGITTAL, NARROW

## (undated) DEVICE — TRACKING KIT, TM KNEE PROCEDURES, VIZADISC

## (undated) DEVICE — BANDAGE, COFLEX, 6 X 5 YDS, TAN, STERILE, LF

## (undated) DEVICE — TUBING, IRRIGATION, HIGH FLOW, HAND PIECE SET

## (undated) DEVICE — SUTURE, VICRYL, 2-0, 18 IN CP-2, UNDYED

## (undated) DEVICE — HOOD, SURGICAL, FLYTE HYBRID